# Patient Record
Sex: FEMALE | Race: WHITE | NOT HISPANIC OR LATINO | Employment: UNEMPLOYED | URBAN - METROPOLITAN AREA
[De-identification: names, ages, dates, MRNs, and addresses within clinical notes are randomized per-mention and may not be internally consistent; named-entity substitution may affect disease eponyms.]

---

## 2018-07-09 ENCOUNTER — HOSPITAL ENCOUNTER (EMERGENCY)
Facility: CLINIC | Age: 58
Discharge: HOME | End: 2018-07-10
Attending: EMERGENCY MEDICINE | Admitting: EMERGENCY MEDICINE
Payer: COMMERCIAL

## 2018-07-09 VITALS
SYSTOLIC BLOOD PRESSURE: 137 MMHG | WEIGHT: 125 LBS | HEIGHT: 61 IN | BODY MASS INDEX: 23.6 KG/M2 | DIASTOLIC BLOOD PRESSURE: 72 MMHG | HEART RATE: 64 BPM | RESPIRATION RATE: 15 BRPM | OXYGEN SATURATION: 100 % | TEMPERATURE: 98.4 F

## 2018-07-09 DIAGNOSIS — H57.89 IRRITATION OF LEFT EYE: ICD-10-CM

## 2018-07-09 PROCEDURE — 99283 EMERGENCY DEPT VISIT LOW MDM: CPT

## 2018-07-09 ASSESSMENT — VISUAL ACUITY
OS: 20/25
OD: 20/100

## 2018-07-09 NOTE — ED AVS SNAPSHOT
Emergency Department    [address] [facility] [address]    Phone:  [phone]    Fax:  [phone]                                       Cristel Turk   MRN: [id number]    Department:   Emergency Department   Date of Visit:  7/9/2018           Patient Information     Date Of Birth          1960        Your diagnoses for this visit were:     Irritation of left eye        You were seen by Justin Klein MD.      Follow-up Information     Follow up with your eye doctor In 1 week.    Why:  As needed        Follow up with  Emergency Department.    Specialty:  EMERGENCY MEDICINE    Why:  If symptoms worsen    Contact information:    [address] [facility] [address] [phone]      Discharge References/Attachments     CONJUNCTIVITIS, NONSPECIFIC (ENGLISH)      24 Hour Appointment Hotline       To make an appointment at any [facility], call [phone] ([phone]). If you don't have a family doctor or clinic, we will help you find one. [facility] are conveniently located to serve the needs of you and your family.             Review of your medicines      Notice     You have not been prescribed any medications.            Orders Needing Specimen Collection     None      Pending Results     No orders found for last 3 day(s).            Pending Culture Results     No orders found for last 3 day(s).            Pending Results Instructions     If you had any lab results that were not finalized at the time of your Discharge, you can call the ED Lab Result RN at [phone]. You will be contacted by this team for any positive Lab results or changes in treatment. The nurses are available 7 days a week from 10A to 6:30P.  You can leave a message 24 hours per day and they will return your call.        Test Results From Your Hospital Stay               Clinical Quality Measure: Blood Pressure Screening     Your blood pressure was checked while you were in  "the emergency department today. The last reading we obtained was  BP: 137/72 . Please read the guidelines below about what these numbers mean and what you should do about them.  If your systolic blood pressure (the top number) is less than 120 and your diastolic blood pressure (the bottom number) is less than 80, then your blood pressure is normal. There is nothing more that you need to do about it.  If your systolic blood pressure (the top number) is 120-139 or your diastolic blood pressure (the bottom number) is 80-89, your blood pressure may be higher than it should be. You should have your blood pressure rechecked within a year by a primary care provider.  If your systolic blood pressure (the top number) is 140 or greater or your diastolic blood pressure (the bottom number) is 90 or greater, you may have high blood pressure. High blood pressure is treatable, but if left untreated over time it can put you at risk for heart attack, stroke, or kidney failure. You should have your blood pressure rechecked by a primary care provider within the next 4 weeks.  If your provider in the emergency department today gave you specific instructions to follow-up with your doctor or provider even sooner than that, you should follow that instruction and not wait for up to 4 weeks for your follow-up visit.        Thank you for choosing [facility]       Thank you for choosing [facility] for your care. Our goal is always to provide you with excellent care. Hearing back from our patients is one way we can continue to improve our services. Please take a few minutes to complete the written survey that you may receive in the mail after you visit with us. Thank you!        [employer] Information     [employer] lets you send messages to your doctor, view your test results, renew your prescriptions, schedule appointments and more. To sign up, go to [email or web address] . Click on \"Log in\" on the left side of the screen, which will take you to " "the Welcome page. Then click on \"Sign up Now\" on the right side of the page.     You will be asked to enter the access code listed below, as well as some personal information. Please follow the directions to create your username and password.     Your access code is: [email or web address]  Expires: 10/8/2018  1:03 AM     Your access code will  in 90 days. If you need help or a new code, please call your [place] clinic or [phone].        Care EveryWhere ID     This is your Care EveryWhere ID. This could be used by other organizations to access your [place] medical records  [id number]        Equal Access to Services     JITENDRA BARTON : Jenifer Gayle, alberto zhong, betty kaplan, madai bills. So [facility] [phone].    ATENCIÓN: Si habla español, tiene a irwin disposición servicios gratuitos de asistencia lingüística. Llame al [phone].    We comply with applicable federal civil rights laws and [place] laws. We do not discriminate on the basis of race, color, national origin, age, disability, sex, sexual orientation, or gender identity.            After Visit Summary       This is your record. Keep this with you and show to your community pharmacist(s) and doctor(s) at your next visit.                  "

## 2018-07-09 NOTE — ED AVS SNAPSHOT
Emergency Department    [address] [place] [address]    Phone:  [phone]    Fax:  [phone]                                       Cristel Turk   MRN: [id number]    Department:   Emergency Department   Date of Visit:  7/9/2018           After Visit Summary Signature Page     I have received my discharge instructions, and my questions have been answered. I have discussed any challenges I see with this plan with the nurse or doctor.    ..........................................................................................................................................  Patient/Patient Representative Signature      ..........................................................................................................................................  Patient Representative Print Name and Relationship to Patient    ..................................................               ................................................  Date                                            Time    ..........................................................................................................................................  Reviewed by Signature/Title    ...................................................              ..............................................  Date                                                            Time

## 2018-07-10 RX ORDER — PROPARACAINE HYDROCHLORIDE 5 MG/ML
SOLUTION/ DROPS OPHTHALMIC
Status: DISCONTINUED
Start: 2018-07-10 | End: 2018-07-10 | Stop reason: HOSPADM

## 2018-07-10 ASSESSMENT — ENCOUNTER SYMPTOMS: EYE PAIN: 0

## 2018-07-10 NOTE — ED PROVIDER NOTES
"  History     Chief Complaint:  Eye Problem    HPI   Cristel Turk is a 58 year old female who presents to the emergency department today for a daily contact stuck in her left eye. The patient reports that this morning she put her contact in her left eye and it did not quite seem right, felt unclear. The patient reports tonight she went to take her contact out and feels like she can feel it but cannot bend it at all to get it out. She denies any eye problems other than the vision problems. She denies any eye pain but states it is irritated from how long the contact has been in there.     Allergies:  No Known Drug Allergies    Medications:    Medications reviewed. No current medications.     Past Medical History:    Medical history reviewed. No pertinent medical history.    Past Surgical History:    Surgical history reviewed. No pertinent surgical history.    Family History:    Family history reviewed. No pertinent family history.     Social History:  Social history reviewed. No pertinent social history.     Review of Systems   Eyes: Negative for pain.        Eye discomfort   All other systems reviewed and are negative.    Physical Exam     Patient Vitals for the past 24 hrs:   BP Temp Temp src Pulse Resp SpO2 Height Weight   07/09/18 2338 137/72 98.4  F (36.9  C) Temporal 64 15 100 % 1.549 m (5' 1\") 56.7 kg (125 lb)     Visual Acuity-Left: 20/25  Visual Acuity-Right: 20/100      Physical Exam  Constitutional:  Alert, answers questions appropriately.   Neck:    Normal range of motion   HEENT:  Pupils round, equal. Mild conjunctival irritation of left eye. No visible contact lens in the eye. No other foreign body. Cornea looks clear. No cell and flare. No photophobia. No corneal lesions. Extraocular movements are normal. Lids and lashes are normal.   Pulmonary/Chest:  Effort normal.   Neurological:   No facial asymmetry, gait intact  Psychiatric:   The patient has a normal mood and affect.     Emergency Department " Course   Emergency Department Course:    0003 Nursing notes and vitals reviewed.    0028 I performed an exam of the patient as documented above.     0045 I personally reviewed the physical exam results with the patient and answered all related questions prior to discharge.    Impression & Plan      Medical Decision Making:  Cristel Tukr is a 58 year old female who presents to the emergency department today reporting that she is unable to get her left contact lens out of her eye. She said her vision did not seem normal today since she put it in, stating it was a little more blurry than usual.     Her eye exam is completely benign aside from some mild conjunctival inflammation. There is no contact lens over her cornea or elsewhere in the eye. I suspect that she did not put it in properly during the day or perhaps it fell out. Her conjunctiva is irritated because of her attempts to remove the contact that is not there. Slit lamp exam is benign. I discussed this with the patient. There are no other concerning findings. I think she is safe for discharge. She left in good condition.      Diagnosis:    ICD-10-CM    1. Irritation of left eye H57.8      Disposition:   The patient is discharged to home.    Discharge Medications:  No discharge medications.    Scribe Disclosure:  I, Lola Rivers, am serving as a scribe at 12:36 AM on 7/10/2018 to document services personally performed by Justin Klein MD based on my observations and the provider's statements to me.     EMERGENCY DEPARTMENT       Justin Klein MD  07/11/18 6268

## 2019-06-18 ENCOUNTER — TRANSFERRED RECORDS (OUTPATIENT)
Dept: HEALTH INFORMATION MANAGEMENT | Facility: CLINIC | Age: 59
End: 2019-06-18

## 2019-08-13 ENCOUNTER — TRANSFERRED RECORDS (OUTPATIENT)
Dept: HEALTH INFORMATION MANAGEMENT | Facility: CLINIC | Age: 59
End: 2019-08-13

## 2019-11-15 ENCOUNTER — TELEPHONE (OUTPATIENT)
Dept: MEDSURG UNIT | Facility: CLINIC | Age: 59
End: 2019-11-15

## 2019-11-19 ENCOUNTER — HOSPITAL ENCOUNTER (OUTPATIENT)
Facility: CLINIC | Age: 59
Discharge: HOME | End: 2019-11-19
Admitting: PHYSICIAN ASSISTANT
Payer: COMMERCIAL

## 2019-11-19 ENCOUNTER — HOSPITAL ENCOUNTER (OUTPATIENT)
Dept: GENERAL RADIOLOGY | Facility: CLINIC | Age: 59
End: 2019-11-19
Attending: PSYCHIATRY & NEUROLOGY
Payer: COMMERCIAL

## 2019-11-19 VITALS
DIASTOLIC BLOOD PRESSURE: 60 MMHG | HEART RATE: 56 BPM | SYSTOLIC BLOOD PRESSURE: 104 MMHG | HEIGHT: 61 IN | TEMPERATURE: 98.6 F | WEIGHT: 126 LBS | OXYGEN SATURATION: 99 % | BODY MASS INDEX: 23.79 KG/M2 | RESPIRATION RATE: 16 BRPM

## 2019-11-19 DIAGNOSIS — F03.90 DEMENTIA (H): ICD-10-CM

## 2019-11-19 LAB
APPEARANCE CSF: CLEAR
APTT PPP: 29 SEC (ref 22–37)
COLOR CSF: COLORLESS
GLUCOSE CSF-MCNC: 62 MG/DL (ref 40–70)
GRAM STN SPEC: NORMAL
INR PPP: 1.01 (ref 0.86–1.14)
PLATELET # BLD AUTO: 187 10E9/L (ref 150–450)
PROT CSF-MCNC: 28 MG/DL (ref 15–60)
RBC # CSF MANUAL: 0 /UL (ref 0–2)
SPECIMEN SOURCE: NORMAL
TUBE # CSF: 4 #
WBC # CSF MANUAL: 0 /UL (ref 0–5)

## 2019-11-19 PROCEDURE — 84999 UNLISTED CHEMISTRY PROCEDURE: CPT | Mod: XU

## 2019-11-19 PROCEDURE — 86316 IMMUNOASSAY TUMOR OTHER: CPT

## 2019-11-19 PROCEDURE — 86618 LYME DISEASE ANTIBODY: CPT

## 2019-11-19 PROCEDURE — 87205 SMEAR GRAM STAIN: CPT

## 2019-11-19 PROCEDURE — 36415 COLL VENOUS BLD VENIPUNCTURE: CPT | Performed by: PHYSICIAN ASSISTANT

## 2019-11-19 PROCEDURE — 88108 CYTOPATH CONCENTRATE TECH: CPT

## 2019-11-19 PROCEDURE — 85049 AUTOMATED PLATELET COUNT: CPT | Performed by: PHYSICIAN ASSISTANT

## 2019-11-19 PROCEDURE — 85730 THROMBOPLASTIN TIME PARTIAL: CPT | Performed by: PHYSICIAN ASSISTANT

## 2019-11-19 PROCEDURE — 86360 T CELL ABSOLUTE COUNT/RATIO: CPT

## 2019-11-19 PROCEDURE — 87015 SPECIMEN INFECT AGNT CONCNTJ: CPT

## 2019-11-19 PROCEDURE — 87529 HSV DNA AMP PROBE: CPT

## 2019-11-19 PROCEDURE — 82042 OTHER SOURCE ALBUMIN QUAN EA: CPT

## 2019-11-19 PROCEDURE — 83519 RIA NONANTIBODY: CPT | Mod: XU

## 2019-11-19 PROCEDURE — 84157 ASSAY OF PROTEIN OTHER: CPT

## 2019-11-19 PROCEDURE — 84182 PROTEIN WESTERN BLOT TEST: CPT

## 2019-11-19 PROCEDURE — 86317 IMMUNOASSAY INFECTIOUS AGENT: CPT

## 2019-11-19 PROCEDURE — 83921 ORGANIC ACID SINGLE QUANT: CPT

## 2019-11-19 PROCEDURE — 83520 IMMUNOASSAY QUANT NOS NONAB: CPT

## 2019-11-19 PROCEDURE — 86255 FLUORESCENT ANTIBODY SCREEN: CPT

## 2019-11-19 PROCEDURE — 82784 ASSAY IGA/IGD/IGG/IGM EACH: CPT

## 2019-11-19 PROCEDURE — 40000863 ZZH STATISTIC RADIOLOGY XRAY, US, CT, MAR, NM

## 2019-11-19 PROCEDURE — 86592 SYPHILIS TEST NON-TREP QUAL: CPT

## 2019-11-19 PROCEDURE — 83916 OLIGOCLONAL BANDS: CPT

## 2019-11-19 PROCEDURE — 36415 COLL VENOUS BLD VENIPUNCTURE: CPT

## 2019-11-19 PROCEDURE — 89050 BODY FLUID CELL COUNT: CPT

## 2019-11-19 PROCEDURE — 87070 CULTURE OTHR SPECIMN AEROBIC: CPT

## 2019-11-19 PROCEDURE — 84999 UNLISTED CHEMISTRY PROCEDURE: CPT

## 2019-11-19 PROCEDURE — 85610 PROTHROMBIN TIME: CPT | Performed by: PHYSICIAN ASSISTANT

## 2019-11-19 PROCEDURE — 00000102 ZZHCL STATISTIC CYTO WRIGHT STAIN TC

## 2019-11-19 PROCEDURE — 86038 ANTINUCLEAR ANTIBODIES: CPT

## 2019-11-19 PROCEDURE — 83519 RIA NONANTIBODY: CPT

## 2019-11-19 PROCEDURE — 82945 GLUCOSE OTHER FLUID: CPT

## 2019-11-19 PROCEDURE — 62270 DX LMBR SPI PNXR: CPT

## 2019-11-19 PROCEDURE — 86341 ISLET CELL ANTIBODY: CPT

## 2019-11-19 PROCEDURE — 82040 ASSAY OF SERUM ALBUMIN: CPT

## 2019-11-19 PROCEDURE — 82164 ANGIOTENSIN I ENZYME TEST: CPT

## 2019-11-19 PROCEDURE — 86359 T CELLS TOTAL COUNT: CPT

## 2019-11-19 PROCEDURE — 82607 VITAMIN B-12: CPT

## 2019-11-19 PROCEDURE — 86689 HTLV/HIV CONFIRMJ ANTIBODY: CPT

## 2019-11-19 PROCEDURE — 88108 CYTOPATH CONCENTRATE TECH: CPT | Mod: 26

## 2019-11-19 RX ORDER — DEXTROSE MONOHYDRATE 25 G/50ML
25-50 INJECTION, SOLUTION INTRAVENOUS
Status: DISCONTINUED | OUTPATIENT
Start: 2019-11-19 | End: 2019-11-19 | Stop reason: HOSPADM

## 2019-11-19 RX ORDER — NICOTINE POLACRILEX 4 MG
15-30 LOZENGE BUCCAL
Status: DISCONTINUED | OUTPATIENT
Start: 2019-11-19 | End: 2019-11-19 | Stop reason: HOSPADM

## 2019-11-19 RX ORDER — LIDOCAINE HYDROCHLORIDE 10 MG/ML
3 INJECTION, SOLUTION EPIDURAL; INFILTRATION; INTRACAUDAL; PERINEURAL ONCE
Status: COMPLETED | OUTPATIENT
Start: 2019-11-19 | End: 2019-11-19

## 2019-11-19 RX ADMIN — LIDOCAINE HYDROCHLORIDE 3 ML: 10 INJECTION, SOLUTION EPIDURAL; INFILTRATION; INTRACAUDAL; PERINEURAL at 15:33

## 2019-11-19 ASSESSMENT — MIFFLIN-ST. JEOR: SCORE: 1073.91

## 2019-11-19 NOTE — PROGRESS NOTES
RADIOLOGY PROCEDURE NOTE  Patient name: Cristel Turk  MRN: [id number]  : 1960    Pre-procedure diagnosis: Memory loss  Post-procedure diagnosis: Same    Procedure Date/Time: 2019  3:47 PM  Procedure: LP   Estimated blood loss: None  Specimen(s) collected with description: 12-13 ml of clear CSF  The patient tolerated the procedure well with no immediate complications.  Significant findings: 14 cm of water    See imaging dictation for procedural details.    Provider name: Justus De Oliveira PA-C  Assistant(s):None

## 2019-11-19 NOTE — DISCHARGE INSTRUCTIONS
Lumbar Puncture Discharge Instructions     After you go home:      You may resume your normal diet    Continue to drink at least 8 ounces of fluid every 1-2 hours until bedtime tonight and continue to drink extra fluids for the next 2 days    Caffeinated beverages may help prevent or reduce spinal headaches    Care of Puncture Site:      If there is a bandaid - you may remove it tomorrow morning    You may shower tomorrow    No tub baths, whirlpools or swimming for 48 hours     Activity - to help prevent spinal headache or spinal fluid leakage:      Minimize your activity today. Flat bedrest for 24 hrs is strongly suggested as this will help to prevent a spinal headache.  You can be up to the bathroom and for meals.    Resume normal activities tomorrow.     Avoid strenuous activity for the next 2 days    Do not drive a vehicle until tomorrow morning    Medicines:      You may resume all medications    Resume your Warfarin/Coumadin at your regular dose today. Follow up with your provider to have your INR rechecked    Resume your Platelet Inhibitors and Aspirin tomorrow at your regular dose    For minor pain, you may take Acetaminophen (Tylenol) or Ibuprofen (Advil)            Call the provider who ordered this procedure if:      Your headache becomes worse or is severe. (A minor headache is not unusual)    You have nausea or vomiting    The site is red, swollen, hot or tender    You have chills or a fever greater than 101 F (38 C)    Any questions or concerns    If you have questions call:        [facility] @ [phone]

## 2019-11-19 NOTE — PROGRESS NOTES
Care Suites Admission Nursing Note    Reason for admission: LP/Spinal tap  CS arrival time: 1340  Accompanied by: Lul-  Name/phone of DC : Lul [phone]  Medications held: ASA, last dose 11/15/19.  Consent signed: Justus FRANCE here to obtain informed consent.  Abnormal assessment/labs: Justus FRANCE updated that patient is only a 4 day ASA hold.  He states ok to proceed.  Labs WNL.  If abnormal, provider notified: Yes.  Education/questions answered: Yes.  Procedure discussed and reviewed.  AVS given and reviewed with verbal understanding received.    Plan: Proceed as scheduled.    4766  Hand off report given to Esme Kulkarni RN.

## 2019-11-19 NOTE — PROGRESS NOTES
Patient back from LP at 1600.  Rating headache pain at a 2 of 10.  Other VS WNL, and LP puncture site is CDI with a bandaid.  Per Justus matamoros for patient to leave at 1645.   Care Suites Discharge Nursing Note    Education/questions answered: YES  Patient DC location: HOME  Accompanied by:   CS discharge time: 1700

## 2019-11-20 LAB
ANA SER QL IF: NEGATIVE
B BURGDOR IGG+IGM SER QL: 0.03 (ref 0–0.89)
CD3 CELLS # BLD: 1061 CELLS/UL (ref 603–2990)
CD3 CELLS NFR BLD: 70 % (ref 49–84)
CD3+CD4+ CELLS # BLD: 700 CELLS/UL (ref 441–2156)
CD3+CD4+ CELLS NFR BLD: 46 % (ref 28–63)
CD3+CD4+ CELLS/CD3+CD8+ CLL BLD: 2.19 % (ref 1.4–2.6)
CD3+CD8+ CELLS # BLD: 314 CELLS/UL (ref 125–1312)
CD3+CD8+ CELLS NFR BLD: 21 % (ref 10–40)
COPATH REPORT: NORMAL
HSV1 DNA CSF QL NAA+PROBE: NOT DETECTED
HSV2 DNA CSF QL NAA+PROBE: NOT DETECTED
IFC SPECIMEN: NORMAL
MICROBIOLOGIST REVIEW: NORMAL
VIT B12 SERPL-MCNC: 947 PG/ML (ref 193–986)

## 2019-11-21 LAB
ACE CSF-CCNC: 1.1 U/L (ref 0–2.5)
ALB CSF/SERPL: 2.3 RATIO (ref 0–9)
ALBUMIN CSF-MCNC: 10 MG/DL (ref 0–35)
ALBUMIN SERPL-MCNC: 4290 MG/DL (ref 3500–5200)
HIV1 AB SERPL QL IB: NEGATIVE
IGG CSF-MCNC: 0.9 MG/DL (ref 0–6)
IGG SERPL-MCNC: 631 MG/DL (ref 768–1632)
IGG SYNTH RATE SER+CSF CALC-MRATE: <0 MG/D
IGG/ALB CLEAR SER+CSF-RTO: 0.61 RATIO (ref 0.28–0.66)
IGG/ALB CSF: 0.09 RATIO (ref 0.09–0.25)
METHYLMALONATE SERPL-SCNC: 0.21 UMOL/L (ref 0–0.4)
OLIGOCLONAL BANDS CSF ELPH-IMP: ABNORMAL
OLIGOCLONAL BANDS CSF ELPH-IMP: NEGATIVE
OLIGOCLONAL BANDS CSF IEF: 0 BANDS (ref 0–1)
RESULT: NORMAL
SEND OUTS MISC TEST CODE: NORMAL
SEND OUTS MISC TEST SPECIMEN: NORMAL
TEST NAME: NORMAL
VDRL CSF QL: NON REACTIVE

## 2019-11-22 LAB — NSE CSF IA-MCNC: 12.8 UG/L (ref 1–7)

## 2019-11-24 LAB
BACTERIA SPEC CULT: NO GROWTH
SPECIMEN SOURCE: NORMAL

## 2019-11-28 LAB — PNP ABY SERUM: NORMAL

## 2019-12-03 LAB
RESULT: NORMAL
SEND OUTS MISC TEST CODE: NORMAL
SEND OUTS MISC TEST SPECIMEN: NORMAL
TEST NAME: NORMAL

## 2019-12-04 ENCOUNTER — TRANSFERRED RECORDS (OUTPATIENT)
Dept: HEALTH INFORMATION MANAGEMENT | Facility: CLINIC | Age: 59
End: 2019-12-04

## 2019-12-27 ENCOUNTER — TRANSCRIBE ORDERS (OUTPATIENT)
Dept: OTHER | Age: 59
End: 2019-12-27

## 2019-12-27 DIAGNOSIS — F03.90 DEMENTIA (H): Primary | ICD-10-CM

## 2019-12-27 DIAGNOSIS — F32.A DEPRESSION: ICD-10-CM

## 2019-12-27 DIAGNOSIS — G97.1 POST LUMBAR PUNCTURE HEADACHE: ICD-10-CM

## 2020-01-27 ENCOUNTER — DOCUMENTATION ONLY (OUTPATIENT)
Dept: CARE COORDINATION | Facility: CLINIC | Age: 60
End: 2020-01-27

## 2020-01-30 ENCOUNTER — PRE VISIT (OUTPATIENT)
Dept: NEUROLOGY | Facility: CLINIC | Age: 60
End: 2020-01-30

## 2020-01-30 NOTE — TELEPHONE ENCOUNTER
FUTURE VISIT INFORMATION      FUTURE VISIT INFORMATION:    Date: 2/26/2020    Time: 9AM    [place] [facility] [place]  REFERRAL INFORMATION:    Referring provider:  Dr. Chau    Referring providers clinic:  [facility]     Reason for visit/diagnosis  Dementia     RECORDS REQUESTED FROM:       Clinic name Comments Records Status Imaging Status   [facility]   Requested Requested    [facility] 12/11/2019, 11/13/2019, 7/17/2019,  Care Everywhere Requested to PACS-MR Head 6/20/2019 by mail                              -2/13/2020-[facility] Records received and scanned to Media tab.   -MR Head 6/18/2019 Received from [facility] brought to be uploaded to PACS-MR @12pm

## 2020-02-25 NOTE — PROGRESS NOTES
[facility] Neurology Consultation    Cristel Turk MRN# [id number]   Age: 60 year old YOB: 1960     Requesting physician: Xin Obando     Reason for Consultation: memory loss ongoing, second opinion    History of Presenting Symptoms:  Cristel Turk is a 60 year old female who presents today for evaluation of memory loss    Much of the patient's past information is taken from 7/17/2019 note [facility].  The patient had 2 years memory concerns at this time, but quickened progression over the few months before visit. At the time of the visit, she continued to ask the same questions, couldn't remember one week - two weeks prior, and loss of memory of conversations w/in 10-30 minutes.  She was not reading anymore at the time of visit, but was an avid reader a few years ago. She was still cooking without mistakes, doing household duties, and driving at this time.  She was asked to have EEG and neuropsychometric testing. Exam was revealing for poor memory. Neuropschological testing revealed characteristics for degenerative process, questionable Alzheimer dementia.  It was discussed on 11/13/2019 follow up that they may perform LP to look exclude other treatable causes of her cognitive decline as well as possibly repeating MRI.  LP was done 11/2019 with no abnormal findings (see below in data), but the patient did have post LP headache.    Two years ago, family members started noticing that Cristel was loosing her ability to do her job.  The patient couldn't do the tasks asked of her, she was getting overwhelmed with things she was doing for 20-30+ years.  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  The patient indicates that she has a fog sometimes in her head.  When she takes the medication at night (Donepezil) she does feel better during the day the next day.  In daily life, she has difficulty indicating what is difficult.  She has to write everything on a calender, she may miss appointments, she struggles with finances now/paperwork, the patient drives to places she knows but if she is on roads she doesn't know then she will get lost.      Six years ago, her current  met her.  He remembers that things were quite different.  She was talkative, could talk for hours on the phone.  She was managing her own finances, working by herself, planning events and parties, driving without incidence.  It is difficult for the family to indicate whether symptoms were present before 2017, around the time of her wedding.  They cannot name specific things they noticed between 2017 and six years ago that they found concerning or atypical.     The patient has no falls reported, doesn't have episodes of dizziness, no hallucinations, no aggressive behaviors, no hypersexual or inappropriate behaviors for social circumstances.  She has no odd movements of her hands or mouth. No swallowing difficulties. No urinary incontinence.  She sleeps over 8 hours a night, but is unsure if she wakes or moves in her sleep.    The family today indicates that they know there may be a dementing process but that they don't have a specific diagnosis.  They are wondering if anything can be done to reverse this process or stop it.      Past Medical History:   Basal ambreen carcinoma  Lichenoid actinic keratosis     Past Surgical History:   None     Social History:   Worked as a para in a school , was working as a  2 years prior.  She was  in 2018 to her current .   Tobacco Use     Smoking status: Never Smoker     Smokeless tobacco: Never Used   Substance Use Topics     Alcohol use: Yes     Comment: Occasional glass of wine     Drug use: No      Family History:   Aunt - memory issues  Uncle - memory issues     Medications:   Donepezil 10 mg at bedtime  ASA 81 mg QD     Allergies:   No Known Allergies     Review of Systems:   A comprehensive 10 point  "review of systems (constitutional, ENT, cardiac, peripheral vascular, lymphatic, respiratory, GI, , Musculoskeletal, skin, Neurological) was performed and found to be negative except as described in this note.      Physical Exam:   Vitals: /73   Pulse 53   Ht 1.549 m (5' 1\")   Wt 55.2 kg (121 lb 12.8 oz)   SpO2 99%   BMI 23.01 kg/m     General: Seated comfortably in no acute distress.  HEENT: Neck supple with normal range of motion. No paracervical muscle tenderness or tightness.  Optic discs sharp and vasculature normal on funduscopic exam.   Heart: Regular rate  Lungs: breathing comfortably  GI: Non tender  Extremities: no edema  Skin: No rashes  Neurologic:    The majority of the visit was spent discussing prior w/up, imaging, and neuropsychological testing.  We ran out of time to have a more specific neurological exam other than what is below.  - Patient has appropriate blink throughout visit. Makes full range of motion with eyes.  No facial droop. No weakness with standing or sitting. sparse speech throughout. Able to read sentences and follow simple commands.         Data: Pertinent prior to visit   Imaging:  MRI brain: 6/20/2019:  The ventricles, sulci and gyri are normal size, shape and contour for age. The midline structures are centrally located with no evidence of shift. There are no suspicious intra or extra-axial fluid collections. No region of restricted diffusion or suspicious regions of abnormal parenchymal enhancement. Expected flow voids in the cavernous carotids and basilar artery.    MRI brain: 5/25/2006  There is mild decreased signal intensity to the bone marrow of the calvarium which is unchanged from the prior study and may be within the range of normal.  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  The ventricles appear normal in size.  No definite signal abnormalities are seen involving the brain parenchyma.  Probable small mucus retention cysts are seen within the " nasopharynx.  Normal flow voids are seen within the major arterial and venous structures.  A probable small mucus retention cyst is seen within the floor of the left maxillary sinus.  The mastoid air cells appear unopacified. Incidental note is made of choroid plexus cysts, left greater than right, described on the prior study, unchanged in appearance.    MRA: 3/20/2006  1.   Vascular tortuosity at an origin of branch vessel from the left M1 segment accounts for the flow void seen on the MRI.  No evidence of aneurysm in this location.  2.   Prominence of the basilar tip which can be seen as a normal variation.  Follow up MRA in one to two years may be helpful to ensure stability of this finding.  3.   The examination is otherwise unremarkable.    Procedures:  EE2019  - Abnormal, diffuse generalized slowing as well as frontal delta slowing consistent with diffuse brain dysfunction such as seen a with encephalopathies of multiple causes (toxic, metabolic, degenerative). No epileptiform activity recorded.    Laboratory:  B12 1240  TSH 3.34  MIC kelli negative  HIV negative    LP: 2019  Cytology, Lyme, autoimmune dementia panel, paraneoplastic panel, oligoclonal bands, VDRL, ACE - Normal  Protein 28, glucose 62, WBC 0, RBC 0  Neuron specific amylase - 12.8 (high)    Neuropsychological report:  I do not have full access to this at the time of visit.  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  Her prior studies were extensive and I wouldn't expand off of them given the  patient's history and symptom progression.  There is not an acute picture to her disease progression  Her MRI doesn't have atrophy for specific regions of the brain usually seen with Alzheimer's disease, nor for other dementias (FTD, CBD, MSA).  The patient is in her early 60's, and while this is on the boarder for onset of Alzheimer's that is early, and cannot say for certain when the beginning of her symptoms started.  At this point, I wouldn't recommend FDG-PET until seeing the patient again in 6 months or other molecular testing for early onset Alzheimer's Disease unless symptoms change (more rapid progression, added cognitive domains being affected).  We did discuss limiting driving at this time, and I encouraged the patient to not drive unless absolutely necessary/emergency.     Plan:  - OT for performance testing  - SW consult for community resources  - Sleep study for sleep apnea evaluation  - Recommended patient discuss with her PCP referral to nearby psychiatrist for depression monitoring  - Continue with Donepezil 10 mg at bedtime    Future:  - Consider EEG repeat with 3 hour sleep deprived study    Follow up in Neurology clinic in 6 months or earlier as needed should new concerns arise.    PITO Wen D.O.   of Neurology      Greater than 50% of the total time (100 min) in this patient encounter was spent on counseling and/or coordination of care. We reviewed diagnostic results, impressions, and discussed other possible tests if symptoms do not improve. We discussed the implications of the diagnosis, as well as risks and benefits of management options. We reviewed treatment instructions and our scheduled follow-up as specified in the discharge plan. We also discussed the importance of compliance with the chosen course of treatment. The patient is in agreement with this plan and has no further questions.

## 2020-02-26 ENCOUNTER — OFFICE VISIT (OUTPATIENT)
Dept: NEUROLOGY | Facility: CLINIC | Age: 60
End: 2020-02-26
Attending: PSYCHIATRY & NEUROLOGY
Payer: COMMERCIAL

## 2020-02-26 VITALS
SYSTOLIC BLOOD PRESSURE: 120 MMHG | DIASTOLIC BLOOD PRESSURE: 73 MMHG | BODY MASS INDEX: 23 KG/M2 | WEIGHT: 121.8 LBS | HEART RATE: 53 BPM | HEIGHT: 61 IN | OXYGEN SATURATION: 99 %

## 2020-02-26 DIAGNOSIS — G30.0 EARLY ONSET ALZHEIMER'S DEMENTIA WITHOUT BEHAVIORAL DISTURBANCE (H): Primary | ICD-10-CM

## 2020-02-26 DIAGNOSIS — F02.80 EARLY ONSET ALZHEIMER'S DEMENTIA WITHOUT BEHAVIORAL DISTURBANCE (H): Primary | ICD-10-CM

## 2020-02-26 RX ORDER — MULTIPLE VITAMINS W/ MINERALS TAB 9MG-400MCG
1 TAB ORAL DAILY
COMMUNITY

## 2020-02-26 RX ORDER — DONEPEZIL HYDROCHLORIDE 10 MG/1
10 TABLET, FILM COATED ORAL AT BEDTIME
Status: ON HOLD | COMMUNITY
End: 2022-02-08

## 2020-02-26 ASSESSMENT — PATIENT HEALTH QUESTIONNAIRE - PHQ9: SUM OF ALL RESPONSES TO PHQ QUESTIONS 1-9: 1

## 2020-02-26 ASSESSMENT — ENCOUNTER SYMPTOMS
TREMORS: 0
EYE REDNESS: 0
EYE WATERING: 0
PARALYSIS: 0
DISTURBANCES IN COORDINATION: 0
EYE PAIN: 0
DIZZINESS: 0
HEADACHES: 0
TINGLING: 0
NUMBNESS: 0
SEIZURES: 0
MEMORY LOSS: 1
DOUBLE VISION: 0
SPEECH CHANGE: 0
WEAKNESS: 0
LOSS OF CONSCIOUSNESS: 0
EYE IRRITATION: 0

## 2020-02-26 ASSESSMENT — MIFFLIN-ST. JEOR: SCORE: 1049.86

## 2020-02-26 ASSESSMENT — PAIN SCALES - GENERAL: PAINLEVEL: NO PAIN (0)

## 2020-02-26 NOTE — NURSING NOTE
Chief Complaint   Patient presents with     Consult     [facility] NEW - POSS DEMENTIA      Ruby Garza, EMT

## 2020-02-26 NOTE — LETTER
2/26/2020        RE: Cristel Turk  [address] [place] [address]     Dear Colleague,    Thank you for referring your patient, Cristel Turk, to the [facility] NEUROLOGY at [facility]. Please see a copy of my visit note below.    [facility] Neurology Consultation    Cristel Turk MRN# [id number]   Age: 60 year old YOB: 1960     Requesting physician: Xin Obando     Reason for Consultation: memory loss ongoing, second opinion    History of Presenting Symptoms:  Cristel Turk is a 60 year old female who presents today for evaluation of memory loss    Much of the patient's past information is taken from 7/17/2019 note with Neurologist Dr.Raluca Zendejas.  The patient had 2 years memory concerns at this time, but quickened progression over the few months before visit. At the time of the visit, she continued to ask the same questions, couldn't remember one week - two weeks prior, and loss of memory of conversations w/in 10-30 minutes.  She was not reading anymore at the time of visit, but was an avid reader a few years ago. She was still cooking without mistakes, doing household duties, and driving at this time.  She was asked to have EEG and neuropsychometric testing. Exam was revealing for poor memory. Neuropschological testing revealed characteristics for degenerative process, questionable Alzheimer dementia.  It was discussed on 11/13/2019 follow up that they may perform LP to look exclude other treatable causes of her cognitive decline as well as possibly repeating MRI.  LP was done 11/2019 with no abnormal findings (see below in data), but the patient did have post LP headache.    Two years ago, family members started noticing that Cristel was loosing her ability to do her job.  The patient couldn't do the tasks asked of her, she was getting overwhelmed with things she was doing for 20-30+ years.  She does mention there was a change in the principal  and this person didn't mesh well with her.  The patient indicates that she has a fog sometimes in her head.  When she takes the medication at night (Donepezil) she does feel better during the day the next day.  In daily life, she has difficulty indicating what is difficult. She has to write everything on a calender, she may miss appointments, she struggles with finances now/paperwork, the patient drives to places she knows but if she is on roads she doesn't know then she will get lost.      Six years ago, her current  met her.  He remembers that things were quite different.  She was talkative, could talk for hours on the phone.  She was managing her own finances, working by herself, planning events and parties, driving without incidence.  It is difficult for the family to indicate whether symptoms were present before 2017, around the time of her wedding.  They cannot name specific things they noticed between 2017 and six years ago that they found concerning or atypical.     The patient has no falls reported, doesn't have episodes of dizziness, no hallucinations, no aggressive behaviors, no hypersexual or inappropriate behaviors for social circumstances.  She has no odd movements of her hands or mouth. No swallowing difficulties. No urinary incontinence.  She sleeps over 8 hours a night, but is unsure if she wakes or moves in her sleep.    The family today indicates that they know there may be a dementing process but that they don't have a specific diagnosis.  They are wondering if anything can be done to reverse this process or stop it.      Past Medical History:   Basal ambreen carcinoma  Lichenoid actinic keratosis     Past Surgical History:   None     Social History:   Worked as a para in a school , was working as a  2 years prior.  She was  in 2018 to her current .   Tobacco Use     Smoking status: Never Smoker     Smokeless tobacco: Never Used   Substance Use Topics     Alcohol use:  "Yes     Comment: Occasional glass of wine     Drug use: No      Family History:   Aunt - memory issues  Uncle - memory issues     Medications:   Donepezil 10 mg at bedtime  ASA 81 mg QD     Allergies:   No Known Allergies     Review of Systems:   A comprehensive 10 point review of systems (constitutional, ENT, cardiac, peripheral vascular, lymphatic, respiratory, GI, , Musculoskeletal, skin, Neurological) was performed and found to be negative except as described in this note.      Physical Exam:   Vitals: /73   Pulse 53   Ht 1.549 m (5' 1\")   Wt 55.2 kg (121 lb 12.8 oz)   SpO2 99%   BMI 23.01 kg/m      General: Seated comfortably in no acute distress.  HEENT: Neck supple with normal range of motion. No paracervical muscle tenderness or tightness.  Optic discs sharp and vasculature normal on funduscopic exam.   Heart: Regular rate  Lungs: breathing comfortably  GI: Non tender  Extremities: no edema  Skin: No rashes  Neurologic:    The majority of the visit was spent discussing prior w/up, imaging, and neuropsychological testing.  We ran out of time to have a more specific neurological exam other than what is below.  - Patient has appropriate blink throughout visit. Makes full range of motion with eyes.  No facial droop. No weakness with standing or sitting. sparse speech throughout. Able to read sentences and follow simple commands.         Data: Pertinent prior to visit   Imaging:  MRI brain: 6/20/2019:  The ventricles, sulci and gyri are normal size, shape and contour for age. The midline structures are centrally located with no evidence of shift. There are no suspicious intra or extra-axial fluid collections. No region of restricted diffusion or suspicious regions of abnormal parenchymal enhancement. Expected flow voids in the cavernous carotids and basilar artery.    MRI brain: 5/25/2006  There is mild decreased signal intensity to the bone marrow of the calvarium which is unchanged from the prior " study and may be within the range of normal.  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  The ventricles appear normal in size.  No definite signal abnormalities are seen involving the brain parenchyma.  Probable small mucus retention cysts are seen within the nasopharynx.  Normal flow voids are seen within the major arterial and venous structures.  A probable small mucus retention cyst is seen within the floor of the left maxillary sinus.  The mastoid air cells appear unopacified. Incidental note is made of choroid plexus cysts, left greater than right, described on the prior study, unchanged in appearance.    MRA: 3/20/2006  1.   Vascular tortuosity at an origin of branch vessel from the left M1 segment accounts for the flow void seen on the MRI.  No evidence of aneurysm in this location.  2.   Prominence of the basilar tip which can be seen as a normal variation.  Follow up MRA in one to two years may be helpful to ensure stability of this finding.  3.   The examination is otherwise unremarkable.    Procedures:  EE2019  - Abnormal, diffuse generalized slowing as well as frontal delta slowing consistent with diffuse brain dysfunction such as seen a with encephalopathies of multiple causes (toxic, metabolic, degenerative). No epileptiform activity recorded.    Laboratory:  B12 1240  TSH 3.34  MIC kelli negative  HIV negative    LP: 2019  Cytology, Lyme, autoimmune dementia panel, paraneoplastic panel, oligoclonal bands, VDRL, ACE - Normal  Protein 28, glucose 62, WBC 0, RBC 0  Neuron specific amylase - 12.8 (high)    Neuropsychological report:  I do not have full access to this at the time of visit.  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  Her prior studies were extensive and I wouldn't expand off of them given the patient's history and symptom progression.  There is not an acute picture to her disease progression  Her MRI doesn't have atrophy for specific regions of the brain usually seen with Alzheimer's disease, nor for other dementias (FTD, CBD, MSA).  The patient is in her early 60's, and while this is on the boarder for onset of Alzheimer's that is early, and cannot say for certain when the beginning of her symptoms started.  At this point, I wouldn't recommend FDG-PET until seeing the patient again in 6 months or other molecular testing for early onset Alzheimer's Disease unless symptoms change (more rapid progression, added cognitive domains being affected).  We did discuss limiting driving at this time, and I encouraged the patient to not drive unless absolutely necessary/emergency.     Plan:  - OT for performance testing  - SW consult for community resources  - Sleep study for sleep apnea evaluation  - Recommended patient discuss with her PCP referral to nearby psychiatrist for depression monitoring  - Continue with Donepezil 10 mg at bedtime    Future:  - Consider EEG repeat with 3 hour sleep deprived study    Follow up in Neurology clinic in 6 months or earlier as needed should new concerns arise.    PITO Wen D.O.   of Neurology      Greater than 50% of the total time (100 min) in this patient encounter was spent on counseling and/or coordination of care. We reviewed diagnostic results, impressions, and discussed other possible tests if symptoms do not improve. We discussed the implications of the diagnosis, as well as risks and benefits of management options. We reviewed treatment instructions and our scheduled follow-up as  specified in the discharge plan. We also discussed the importance of compliance with the chosen course of treatment. The patient is in agreement with this plan and has no further questions.    Gómez Wen, DO

## 2020-02-26 NOTE — PATIENT INSTRUCTIONS
I believe you have a neurodegenerative process.  I believe this is at a stage called moderate-cognitive impairment- amnestic type.  This classification can become Alzheimer's Disease, and I believe that this diagnosis will be made in the future during repeat evaluations.    - OT evaluation for performance testing  -  for [facility]  - Sleep study for apnea evaluation

## 2020-03-02 ENCOUNTER — PATIENT OUTREACH (OUTPATIENT)
Dept: CARE COORDINATION | Facility: CLINIC | Age: 60
End: 2020-03-02

## 2020-03-02 NOTE — PROGRESS NOTES
Social Work Intervention  [facility]    Data/Intervention:    Patient Name:  Cristel Turk  /Age:  1960 (60 year old)    Visit Type: telephone  Referral Source: Dr Gómez Wen  Reason for Referral:  New dx Alzheimer's, community resources and support    Collaborated With:    -Lul Turk Pt's spouse  -Miriam Taveras, Pt's dtr    Patient Concerns/Issues:   Contacted Pt's spouse Lul and he suggested I also contact Pt's dtr Miriam to discuss resources and information related to Alzhiemer's disease.   Pt has been  to Lul 3 years and has known him for 6 years. Pt has 2 children from another relationship, Miriam and Sreedhar,  Both living in the [facility] area. Sreedhar is not very involved and hasn't had a good relationship with his Mother. Kemal indicates that Miriam has been very involved and helpful. Miriam is a stay home Mom is ready any day to deliver her 3rd child.   Pt stopped working about a year ago. She is not receiving any disability income.  She had difficulty at work due to memory loss. She is driving. She uses a GPS. Lul feels she is still safe. Miriam plans to ride with her to make sure she is still ok to drive. They are concerned about her isolation alfred if she needs to stop driving. She moved to [place] when she  Kemal and doesn't have a social support network there. Kemal is working full time. She has a local PCP and has an appt. Miriam plans to request a depression assessment. She indicated that her Mother is very sad about her dx. Kemal said that sometimes she remembers it and other times she doesn't. Miriam indicates that she has lost some weight.     Intervention/Education/Resources Provided:  Reviewed SSDI and how to apply. Kemal is reluctant to seek that assistance and feels they can manage without that income.  He didn't want to get too many details today and asked that I share more with Miriam. He is looking/observing for safety issues. He  feels she is still ok to drive. He did state that she gets frustrated more easily and overwhelmed when she has difficulty with the gps. There haven't been any mishaps in the kitchen but discussed the stove as being a possible safety issue.  Reviewed Legal documents that are helpful and not to wait until she needs them to get them done. They need to be done asap. This includes a HCD, POA$ and a WIll.  Discussed contacting the Alzheimer's [employer] to meet with their staff about programs offered for support and other resources.   Discussed the CADI waiver program and services it offers at home including a day program which might be of benefit to Pt relatively soon. Discussed MA-LTC she must qualify for to get on the CADI program.  I emailed above resources to Pt's dtr Miriam.     Assessment/Plan:  Didn't contact Pt and Miriam verified that was the right choice. She would be confused. Support to Pt's family re this difficult dx and discussed planning for the future. Pt's spouse is overwhelmed. Miriam is helping them and will be key to getting her connected with resources. Encouraged them to contact me as needed in the future.    Provided patient/family with contact information and availability.    RUBINA Jones, [facility]    [facility] [place]  [phone]

## 2020-03-06 ENCOUNTER — PATIENT OUTREACH (OUTPATIENT)
Dept: CARE COORDINATION | Facility: CLINIC | Age: 60
End: 2020-03-06

## 2020-03-06 NOTE — PROGRESS NOTES
Social Work Telephone Message Note  [facility] [place]    Patient Name:  Cristel Turk  /Age:  1960 (60 year old)    Referral Source: Call from pt's  (Coverage for SW Antonina Jacobs)  Reason for Referral: Applying for SSDI     Received message from pt's , Kemal, requesting call back to discuss applying for disability.  contacted patient via telephone on 3/6/20. Left message on patient's voicemail with SW call back info. Will await patient's return phone call and provide assistance at that time.    RUBINA Mak, [facility]  Clinical   Outpatient Speciality Clinics   [facility] [place]  Ph. [phone]    NO LETTER

## 2020-03-13 ENCOUNTER — PATIENT OUTREACH (OUTPATIENT)
Dept: CARE COORDINATION | Facility: CLINIC | Age: 60
End: 2020-03-13

## 2020-03-13 NOTE — PROGRESS NOTES
Social Work Follow-Up  [facility]    Data/Intervention:  Patient Name:  Cristel Turk  /Age:  1960 (60 year old)    Reason for Follow-Up:  Paperwork questions    Collaborated With:    -Lul, Pt's spouse    Intervention/Education/Resources Provided:  Pt's ann Mccarty called re paperwork that Pt's dtr gave him that I had sent to her. It's [facility] paperwork related to getting on the CADI waiver so she can get services to support and care for her at home.  Discussed the paperwork and some of the criteria. He will complete and send to their county. He will notify me when he is sending in the paperwork so that we can arrange to have a CADI assessment at home.     Assessment/Plan:  Await spouses phone call and set up CADI assessment.     Previously provided patient/family with writer's contact information and availability.       RUBINA Jones, [facility]    [facility]  [phone]

## 2020-03-15 ENCOUNTER — HEALTH MAINTENANCE LETTER (OUTPATIENT)
Age: 60
End: 2020-03-15

## 2020-08-06 ENCOUNTER — TELEPHONE (OUTPATIENT)
Dept: NEUROLOGY | Facility: CLINIC | Age: 60
End: 2020-08-06

## 2020-08-06 NOTE — TELEPHONE ENCOUNTER
I called Miriam back. I let her know we can include family members in the video visit. We can send them an e-mail invite and/or call them and conference them into the visit. I asked she call with contact information for the family members who wish to be present and we can place that info in pt appointment note. Then the day of the visit we can reach out to everyone.     Melonie BUSCH

## 2020-08-06 NOTE — TELEPHONE ENCOUNTER
[facility] Call Center    Phone Message    May a detailed message be left on voicemail: yes     Reason for Call: Other: Per call from Miriam there are three family members that wanted to be apart of the video APPT on 9/1 and would like to know if it's possible. Please advise.     Action Taken: Message routed to:  [facility] ([facility] Neurology    Travel Screening: Not Applicable

## 2020-08-27 NOTE — TELEPHONE ENCOUNTER
[facility] Call Center    Phone Message    May a detailed message be left on voicemail: yes     Reason for Call: Other: Miriam called with family info: (Sreedhar, son) [email or web address], phone: [phone], Miriam: [email or web address], phone number as above.      Action Taken: Message routed to:  [facility] Neurology    Travel Screening: Not Applicable

## 2020-09-01 ENCOUNTER — VIRTUAL VISIT (OUTPATIENT)
Dept: NEUROLOGY | Facility: CLINIC | Age: 60
End: 2020-09-01
Payer: COMMERCIAL

## 2020-09-01 DIAGNOSIS — G30.0 EARLY ONSET ALZHEIMER'S DEMENTIA WITHOUT BEHAVIORAL DISTURBANCE (H): Primary | ICD-10-CM

## 2020-09-01 DIAGNOSIS — F02.80 EARLY ONSET ALZHEIMER'S DEMENTIA WITHOUT BEHAVIORAL DISTURBANCE (H): Primary | ICD-10-CM

## 2020-09-01 RX ORDER — ESCITALOPRAM OXALATE 5 MG/1
1 TABLET ORAL DAILY
COMMUNITY
Start: 2020-06-01 | End: 2022-01-29

## 2020-09-01 RX ORDER — RIBOFLAVIN (VITAMIN B2) 100 MG
100 TABLET ORAL DAILY
COMMUNITY
Start: 2020-06-01 | End: 2022-01-29

## 2020-09-01 NOTE — LETTER
9/1/2020       RE: Cristel Turk  [address] [place] [address]     Dear Colleague,    Thank you for referring your patient, Cristel Turk, to the [facility] NEUROLOGY at [facility]. Please see a copy of my visit note below.    [facility] Neurology Follow Up Visit    Cristel Turk MRN# [id number]   Age: 60 year old YOB: 1960     Brief history of symptoms: The patient was initially seen in neurologic consultation on 2/26/2020 for evaluation of memory loss/second opinion. Please see the comprehensive neurologic consultation note from that date in the Epic records for details. The patient had prior w/up through Neurologist on 7/17/2019 with poor memory testing being reported and w/up including EEG and neuropsychological testing.  MRI brain from 2019 compared to 2006 showed normal seize/shape/contour for age of ventricles/sulci/gyri.  Neuropsychological testing showed characteristic cognitive deficits for neurodegenerative process, questionable Alzheimer's Disease. OT assessment was done 12/2019, showing MoCA of 15/30     LP was done 11/2019 with no major findings.  My initial impression was for MCI amestic type, and further recommendations for OT, SW, Sleep study, pychiatric management of depression, and continuing of Donepezil 10 mg at bedtime was made.    Interval history: The patient has been taking anti-anxiety medications as per her PCP.  No sleep study was done.  There was no evaluation through an occupational therapist.  The anti-anxiety medications aren't necessarily helping, but it is unclear that the patient is taking her medications consistently.  There are no new neurological symptoms to speak of, reported from family or the patient.     Medications:     Current Outpatient Medications   Medication Sig     aspirin 81 MG tablet Take 1 tablet (81 mg) by mouth daily     donepezil (ARICEPT) 10 MG tablet Take 10 mg by mouth At Bedtime     multivitamin w/minerals  (MULTI-VITAMIN) tablet Take 1 tablet by mouth daily   - Sertraline - 50 mg QHS     Review of Systems:   A comprehensive 10 point review of systems (constitutional, ENT, cardiac, peripheral vascular, lymphatic, respiratory, GI, , Musculoskeletal, skin, Neurological) was performed and found to be negative except as described in this note.          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  I discussed with the family and patient today that I believe the patient has a mild to major neurocognitive disorder which will likely progress into Alzheimer's dementia with repeated evaluations.  The patient is responding well/has minimal side-effects with Donepezil and we could add no Memantine to see if helps as an adjunctive agent to donepezil.  She may benefit from repeated neuropsychological evaluation in early 2021 to see what/if any progression has occurred.  She may also benefit from seeing a dementia specialist given her age of onset and prior testing.  These tests will be considered on our follow up in early 2021.     Plan:  Memantine 5 mg QAM  OT for CPT  Sleep study    Follow up in Neurology clinic in 4-6 months or earlier as needed should new concerns arise.    Greater than 50% of the total time (45 min) in this patient encounter was spent on counseling and/or coordination of care. We reviewed diagnostic results, impressions, and discussed other possible tests if symptoms do not improve. We discussed the implications of the diagnosis, as well as risks and benefits of management options. We reviewed treatment instructions and our scheduled follow-up as specified in the discharge plan. We also discussed the importance of compliance with the chosen course of treatment. The patient is in agreement with this plan and has  no further questions.    Again, thank you for allowing me to participate in the care of your patient.  Sincerely,    PITO Wen D.O.   of Neurology

## 2020-09-01 NOTE — PROGRESS NOTES
"Cristel Turk is a 60 year old female who is being evaluated via a billable video visit.      The patient has been notified of following:     \"This video visit will be conducted via a call between you and your physician/provider. We have found that certain health care needs can be provided without the need for an in-person physical exam.  This service lets us provide the care you need with a video conversation.  If a prescription is necessary we can send it directly to your pharmacy.  If lab work is needed we can place an order for that and you can then stop by our lab to have the test done at a later time.    Video visits are billed at different rates depending on your insurance coverage.  Please reach out to your insurance provider with any questions.    If during the course of the call the physician/provider feels a video visit is not appropriate, you will not be charged for this service.\"    Patient has given verbal consent for Video visit? YES  How would you like to obtain your AVS? MyChart  If you are dropped from the video visit, the video invite should be resent to: [email or web address]  Will anyone else be joining your video visit? Yes akin Eli email link to [email or web address]        Video-Visit Details    Type of service:  Video Visit    Video Start Time: 1000  Video End Time: 1040    Originating Location (pt. Location): Home     Distant Location (provider location):  [facility] NEUROLOGY     Platform used for Video Visit: Omega Garza, EMT        "

## 2020-09-01 NOTE — LETTER
9/1/2020       RE: Cristel Turk  [address] [place] [address]     Dear Colleague,    Thank you for referring your patient, Cristel Turk, to the [facility] NEUROLOGY at [facility]. Please see a copy of my visit note below.    [facility] Neurology Follow Up Visit    Cristel Turk MRN# [id number]   Age: 60 year old YOB: 1960     Brief history of symptoms: The patient was initially seen in neurologic consultation on 2/26/2020 for evaluation of memory loss/second opinion. Please see the comprehensive neurologic consultation note from that date in the Epic records for details. The patient had prior w/up through Neurologist on 7/17/2019 with poor memory testing being reported and w/up including EEG and neuropsychological testing.  MRI brain from 2019 compared to 2006 showed normal seize/shape/contour for age of ventricles/sulci/gyri.  Neuropsychological testing showed characteristic cognitive deficits for neurodegenerative process, questionable Alzheimer's Disease. OT assessment was done 12/2019, showing MoCA of 15/30     LP was done 11/2019 with no major findings.  My initial impression was for MCI amestic type, and further recommendations for OT, SW, Sleep study, pychiatric management of depression, and continuing of Donepezil 10 mg at bedtime was made.    Interval history: The patient has been taking anti-anxiety medications as per her PCP.  No sleep study was done.  There was no evaluation through an occupational therapist.  The anti-anxiety medications aren't necessarily helping, but it is unclear that the patient is taking her medications consistently.  There are no new neurological symptoms to speak of, reported from family or the patient.     Medications:     Current Outpatient Medications   Medication Sig     aspirin 81 MG tablet Take 1 tablet (81 mg) by mouth daily     donepezil (ARICEPT) 10 MG tablet Take 10 mg by mouth At Bedtime     multivitamin w/minerals  (MULTI-VITAMIN) tablet Take 1 tablet by mouth daily   - Sertraline - 50 mg QHS     Review of Systems:   A comprehensive 10 point review of systems (constitutional, ENT, cardiac, peripheral vascular, lymphatic, respiratory, GI, , Musculoskeletal, skin, Neurological) was performed and found to be negative except as described in this note.          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  I discussed with the family and patient today that I believe the patient has a mild to major neurocognitive disorder which will likely progress into Alzheimer's dementia with repeated evaluations.  The patient is responding well/has minimal side-effects with Donepezil and we could add no Memantine to see if helps as an adjunctive agent to donepezil.  She may benefit from repeated neuropsychological evaluation in early 2021 to see what/if any progression has occurred.  She may also benefit from seeing a dementia specialist given her age of onset and prior testing.  These tests will be considered on our follow up in early 2021.     Plan:  Memantine 5 mg QAM  OT for CPT  Sleep study    Follow up in Neurology clinic in 4-6 months or earlier as needed should new concerns arise.    PITO Wen D.O.   of Neurology      Greater than 50% of the total time (45 min) in this patient encounter was spent on counseling and/or coordination of care. We reviewed diagnostic results, impressions, and discussed other possible tests if symptoms do not improve. We discussed the implications of the diagnosis, as well as risks and benefits of management options. We reviewed treatment instructions and our scheduled follow-up as specified in the discharge plan. We also discussed the importance of compliance with the chosen course of  "treatment. The patient is in agreement with this plan and has no further questions.      Cristel Turk is a 60 year old female who is being evaluated via a billable video visit.      The patient has been notified of following:     \"This video visit will be conducted via a call between you and your physician/provider. We have found that certain health care needs can be provided without the need for an in-person physical exam.  This service lets us provide the care you need with a video conversation.  If a prescription is necessary we can send it directly to your pharmacy.  If lab work is needed we can place an order for that and you can then stop by our lab to have the test done at a later time.    Video visits are billed at different rates depending on your insurance coverage.  Please reach out to your insurance provider with any questions.    If during the course of the call the physician/provider feels a video visit is not appropriate, you will not be charged for this service.\"    Patient has given verbal consent for Video visit? YES  How would you like to obtain your AVS? MyChart  If you are dropped from the video visit, the video invite should be resent to: [email or web address]  Will anyone else be joining your video visit? Yes son Sreedhar email link to [email or web address]        Video-Visit Details    Type of service:  Video Visit    Video Start Time: 1000  Video End Time: 1040    Originating Location (pt. Location): Home     Distant Location (provider location):  [facility] NEUROLOGY     Platform used for Video Visit: Omega Garza, EMT          Again, thank you for allowing me to participate in the care of your patient.      Sincerely,    Gómez Wen, DO      "

## 2020-09-01 NOTE — PROGRESS NOTES
[facility] Neurology Follow Up Visit    Cristel Turk MRN# [id number]   Age: 60 year old YOB: 1960     Brief history of symptoms: The patient was initially seen in neurologic consultation on 2/26/2020 for evaluation of memory loss/second opinion. Please see the comprehensive neurologic consultation note from that date in the Epic records for details. The patient had prior w/up through Neurologist on 7/17/2019 with poor memory testing being reported and w/up including EEG and neuropsychological testing.  MRI brain from 2019 compared to 2006 showed normal seize/shape/contour for age of ventricles/sulci/gyri.  Neuropsychological testing showed characteristic cognitive deficits for neurodegenerative process, questionable Alzheimer's Disease. OT assessment was done 12/2019, showing MoCA of 15/30     LP was done 11/2019 with no major findings.  My initial impression was for MCI amestic type, and further recommendations for OT, SW, Sleep study, pychiatric management of depression, and continuing of Donepezil 10 mg at bedtime was made.    Interval history: The patient has been taking anti-anxiety medications as per her PCP.  No sleep study was done.  There was no evaluation through an occupational therapist.  The anti-anxiety medications aren't necessarily helping, but it is unclear that the patient is taking her medications consistently.  There are no new neurological symptoms to speak of, reported from family or the patient.     Medications:     Current Outpatient Medications   Medication Sig     aspirin 81 MG tablet Take 1 tablet (81 mg) by mouth daily     donepezil (ARICEPT) 10 MG tablet Take 10 mg by mouth At Bedtime     multivitamin w/minerals (MULTI-VITAMIN) tablet Take 1 tablet by mouth daily   - Sertraline - 50 mg QHS     Review of Systems:   A comprehensive 10 point review of systems (constitutional, ENT, cardiac, peripheral vascular, lymphatic, respiratory, GI, , Musculoskeletal, skin, Neurological) was  performed and found to be negative except as described in this note.          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  I discussed with the family and patient today that I believe the patient has a mild to major neurocognitive disorder which will likely progress into Alzheimer's dementia with repeated evaluations.  The patient is responding well/has minimal side-effects with Donepezil and we could add no Memantine to see if helps as an adjunctive agent to donepezil.  She may benefit from repeated neuropsychological evaluation in early 2021 to see what/if any progression has occurred.  She may also benefit from seeing a dementia specialist given her age of onset and prior testing.  These tests will be considered on our follow up in early 2021.     Plan:  Memantine 5 mg QAM  OT for CPT  Sleep study    Follow up in Neurology clinic in 4-6 months or earlier as needed should new concerns arise.    PITO Wen D.O.   of Neurology      Greater than 50% of the total time (45 min) in this patient encounter was spent on counseling and/or coordination of care. We reviewed diagnostic results, impressions, and discussed other possible tests if symptoms do not improve. We discussed the implications of the diagnosis, as well as risks and benefits of management options. We reviewed treatment instructions and our scheduled follow-up as specified in the discharge plan. We also discussed the importance of compliance with the chosen course of treatment. The patient is in agreement with this plan and has no further questions.

## 2020-09-02 ENCOUNTER — MEDICAL CORRESPONDENCE (OUTPATIENT)
Dept: HEALTH INFORMATION MANAGEMENT | Facility: CLINIC | Age: 60
End: 2020-09-02

## 2020-09-02 RX ORDER — MEMANTINE HYDROCHLORIDE 5 MG/1
5 TABLET ORAL
Qty: 28 TABLET | Refills: 3 | Status: SHIPPED | OUTPATIENT
Start: 2020-09-02 | End: 2020-12-28

## 2020-09-03 ENCOUNTER — TELEPHONE (OUTPATIENT)
Dept: NEUROLOGY | Facility: CLINIC | Age: 60
End: 2020-09-03

## 2020-09-03 ENCOUNTER — CARE COORDINATION (OUTPATIENT)
Dept: NEUROLOGY | Facility: CLINIC | Age: 60
End: 2020-09-03

## 2020-09-03 NOTE — TELEPHONE ENCOUNTER
I called Kemal back. I left a voicemail we could fax Cristel's sleep study referral to which ever facility is convenient for them. If they would like to go to [facility] I ask they verify this is a service they offer and let us know what the fax number is. Then we can send her order over.     Melonie BUSCH

## 2020-09-03 NOTE — TELEPHONE ENCOUNTER
[facility] Call Center    Phone Message    May a detailed message be left on voicemail: yes     Reason for Call: Other: Patients spouse Kemal calling in regards to sleep study that was ordered by Dr. Wen wondering if patient can get sleep study done over at [facility] rather than [facility].     Please advise and call Keaml back.    Action Taken: Other:  NEUROLOGY     Travel Screening: Not Applicable

## 2020-09-03 NOTE — TELEPHONE ENCOUNTER
[facility] Call Center    Phone Message    May a detailed message be left on voicemail: yes     Reason for Call: Other: Kemal calling in returning missed call from Melonie Wakefield, RN   please call back, thank you  - the fax number requested by Melonie Wakefield, JO-ANN is- [phone] for the sleep study.          Action Taken: Message routed to:  [facility] neuro     Travel Screening: Not Applicable

## 2020-09-03 NOTE — TELEPHONE ENCOUNTER
I gave Kemal a call back. I let him know I faxed the sleep study referral to [facility] at the number provided fax: [phone]. He is going to look into a rehab facility near them for us to send Cristel's OT referral. He will call back with the name and fax for the facility once he decides.     Melonie BUSCH

## 2020-09-22 ENCOUNTER — TRANSFERRED RECORDS (OUTPATIENT)
Dept: HEALTH INFORMATION MANAGEMENT | Facility: CLINIC | Age: 60
End: 2020-09-22

## 2020-09-24 ENCOUNTER — DOCUMENTATION ONLY (OUTPATIENT)
Dept: NEUROLOGY | Facility: CLINIC | Age: 60
End: 2020-09-24

## 2020-09-24 NOTE — PROGRESS NOTES
OT Plan of Care from [facility] has been received and placed in providers chart for review and signature

## 2020-09-29 ENCOUNTER — TRANSFERRED RECORDS (OUTPATIENT)
Dept: HEALTH INFORMATION MANAGEMENT | Facility: CLINIC | Age: 60
End: 2020-09-29

## 2020-10-22 ENCOUNTER — CARE COORDINATION (OUTPATIENT)
Dept: NEUROLOGY | Facility: CLINIC | Age: 60
End: 2020-10-22

## 2020-10-22 ENCOUNTER — TELEPHONE (OUTPATIENT)
Dept: NEUROLOGY | Facility: CLINIC | Age: 60
End: 2020-10-22

## 2020-10-22 NOTE — TELEPHONE ENCOUNTER
I called Kemal back. I let him know we did not receive the sleep study results. We do have a note we received the OT plan. I will work on retrieving the sleep study report.     I called [facility] and was told I can fax request to [phone]. Request faxed.     Once results received we will call pt to schedule follow up with Dr. Wen.     Melonie BUSCH

## 2020-10-22 NOTE — PROGRESS NOTES
Records request sent to [facility] at fax: [phone] asking pt OT note.     Records request to [facility] at fax: [phone] asking for pt sleep study report.     Once we receive pt would like to schedule follow up with Dr. Wen.     Melonie BUSCH

## 2020-10-22 NOTE — TELEPHONE ENCOUNTER
[facility] Call Center    Phone Message    May a detailed message be left on voicemail: yes     Reason for Call: Other: Kemal calling in regards to sleep study and OT that patient had done a couple weeks ago. Kemal is calling to check if Dr. Wen has received the results/information from these tests. States that patient had the sleep study done at [facility] about 3 weeks ago and the OT was done at [facility].     Kemal is requesting a call back to confirm Dr. Wen has received these and then would like to make appointment after that has been confirmed.     Please advise       Action Taken: Other: [facility] NEUROLOGY     Travel Screening: Not Applicable

## 2020-10-26 ENCOUNTER — CARE COORDINATION (OUTPATIENT)
Dept: NEUROLOGY | Facility: CLINIC | Age: 60
End: 2020-10-26

## 2020-10-26 NOTE — PROGRESS NOTES
2nd request for records faxed; no records received yet from [facility] or [facility]. Request marked Urgent.     Records request sent to [facility] at fax: [phone] asking pt OT note.      Records request to [facility] at fax: [phone] asking for pt sleep study report.      Once we receive pt would like to schedule follow up with Dr. Wen.      Melonie BUSCH

## 2020-10-28 ENCOUNTER — TELEPHONE (OUTPATIENT)
Dept: NEUROLOGY | Facility: CLINIC | Age: 60
End: 2020-10-28

## 2020-10-28 NOTE — TELEPHONE ENCOUNTER
I left pt spouse Kemal a message that we are still waiting on records from [facility] regarding sleep study. I have sent two requests. If I don't hear back by tomorrow I will try to call [facility].     Melonie BUSCH

## 2020-10-29 ENCOUNTER — TELEPHONE (OUTPATIENT)
Dept: NEUROLOGY | Facility: CLINIC | Age: 60
End: 2020-10-29

## 2020-10-29 ENCOUNTER — CARE COORDINATION (OUTPATIENT)
Dept: NEUROLOGY | Facility: CLINIC | Age: 60
End: 2020-10-29

## 2020-10-29 NOTE — PROGRESS NOTES
[facility] [phone] to request pt OT visit note. They will fax to us today.   Melonie BUSCH

## 2020-10-29 NOTE — TELEPHONE ENCOUNTER
I left a message for pt her records from [facility] have arrived. If they would like to schedule the follow up with Dr. Wen now they can call the clinic to schedule.     Melonie BUSCH

## 2020-10-29 NOTE — PROGRESS NOTES
I called [facility] at  [phone]. I spoke to medical records and they will fax pt sleep study note today.     Melonie BUSCH

## 2020-11-06 ENCOUNTER — DOCUMENTATION ONLY (OUTPATIENT)
Dept: CARE COORDINATION | Facility: CLINIC | Age: 60
End: 2020-11-06

## 2020-11-06 NOTE — PROGRESS NOTES
Social Work Follow-Up  [facility]    Data/Intervention:  Patient Name:  Cristel Turk  /Age:  1960 (60 year old)    Reason for Follow-Up:  Concerns about her Mother    Collaborated With:    -Miriam, Pt's dtr    Intervention/Education/Resources Provided:  Miriam called to get some advice about how best to help her Mother in her current situation. She's concerned her Mother is home alone most of the time. Her spouse Kemal hasn't sent in the application for MA/CADI to be able to start services because he's heard horror stories from people about having services at home. He hasn't started an application as far as Miriam knows for SSDI either. Pt needs to be determined to be disabled by either sliding scale or the State to receive CADI services.   She indicated that Lul reports that sometimes Cristel doesn't know who he is.   Legal paperwork is not completed but has been started re HCD, POA$. Communication is difficult with Lul. They have only been  for about 4-5 years per Miriam.  Discussed these issues and offered some suggestions re moving forward. They don't have a final dx yet for Cristel but have an upcoming meeting with Dr Wen.  Cristel's sister who has worked in LTC has offered to have Cristel come and stay with her. Encouraged Miriam to try to facilitate that plan as at least a short term option. She plans to try to move in that direction very soon. She will also assist with an appl for SSDI.  Discussed adult day care as an option for Kemal to pay for at approx $100/day but due to the pandemic, those programs are not operating at all or at limited capacity.    Assessment/Plan:  Difficult family dynamics and concern about spouse's ability to provide the care Cristel will be needing while also being employed full time. Encouraged a non confronting approach with Kemal to help make plans together in Cristel's best interest.   I will discuss with Dr Wen.     Previously  provided patient/family with writer's contact information and availability.       RUBINA Jones, [facility]    [facility]  [phone]

## 2020-11-17 ENCOUNTER — VIRTUAL VISIT (OUTPATIENT)
Dept: NEUROLOGY | Facility: CLINIC | Age: 60
End: 2020-11-17
Payer: COMMERCIAL

## 2020-11-17 DIAGNOSIS — G30.0 EARLY ONSET ALZHEIMER'S DEMENTIA WITHOUT BEHAVIORAL DISTURBANCE (H): Primary | ICD-10-CM

## 2020-11-17 DIAGNOSIS — F02.80 EARLY ONSET ALZHEIMER'S DEMENTIA WITHOUT BEHAVIORAL DISTURBANCE (H): Primary | ICD-10-CM

## 2020-11-17 PROCEDURE — 99215 OFFICE O/P EST HI 40 MIN: CPT | Mod: GT | Performed by: PSYCHIATRY & NEUROLOGY

## 2020-11-17 RX ORDER — CITALOPRAM HYDROBROMIDE 10 MG/1
TABLET ORAL
Qty: 261 TABLET | Refills: 0 | Status: SHIPPED | OUTPATIENT
Start: 2020-11-17 | End: 2022-01-29

## 2020-11-17 NOTE — PROGRESS NOTES
[facility] Neurology Follow Up Visit    Cristel Turk MRN# [id number]   Age: 60 year old YOB: 1960     Brief history of symptoms: The patient was initially seen in neurologic consultation on 2/26/2020 and for follow up 9/1/2020 for evaluation of memory decline. Please see the comprehensive neurologic consultation note from that date in the Epic records for details. Overall w/up stemming from 7/17/2019 for poor memory included EEG (no seizure), neuropsychological testing (characterstic for neurodegenerative process, early onset Alzheimer's, MRI brain (no major atrophy), OT assessment (MoCA 15/30), and LP (11/2019, no major findings).  After my initial meeting with the patient, I recommended further testing with OT, Social work, a sleep study, psychiatric management for depression, and to start Donepezil 10 mg at bedtime.  After follow up, there was concern that the patient wasn't taking her medications appropriately, and concern that multiple evaluations were still not done.  Repeat recommendations for OT, SW, and sleep study were made, as well as starting memantine 5 mg HS.    The patient had a SLUMS score of 5/30 and a CPT score of 4.0/5.6 on 9/22/2020 with OT.    Sleep study (9/29/2020) showed mild JHON, and if symptoms of daytime fatigue were present a further evaluation of the study for possible treatment with CPAP was recommended through sleep medicine.    Social work visit on 11/6/2020 from [facility] notes that the patient's spouse had yet to complete MA/CADI/SSDI application for services due to fear of the services leading to worsened safety for Cristel.  The patient's daugher, Miriam, was concerned about her mother's current situation. The family was not aware of their mother's diagnosis.  The patient's sister, Cristel, who worked as an LTC provider, was asking for her sister to live with her for safety.  It was reported that family dynamics were complicated but there was an overall concern for  Kemal's (patient's spouse) ability to care for Cristel while also working full time.    Interval history: The patient has moved in with her sister for the last week.  She has forgotten her 's name and at times their relationship.  Lul (the patient's ) is distraught and is having difficulties with the loss of his partner but is understanding of the progression of her disease and the need for help from other family members.  The patient's daughter and son are present today, and are understanding of her condition/diagnosis.  At this time, the patient is in a safe environment, but the family is moving to have multiple disability forms filled out so that further cares can be given and paid for.    When talking with the patient's daughter, it is mentioned that early dementia runs on the patients maternal side.     Medications:     Current Outpatient Medications   Medication Sig     aspirin 81 MG tablet Take 1 tablet (81 mg) by mouth daily     donepezil (ARICEPT) 10 MG tablet Take 10 mg by mouth At Bedtime     escitalopram (LEXAPRO) 5 MG tablet - not taking Take 1 tablet by mouth daily     memantine (NAMENDA) 5 MG tablet Take 1 tablet (5 mg) by mouth daily before breakfast     multivitamin w/minerals (MULTI-VITAMIN) tablet Take 1 tablet by mouth daily     sertraline (ZOLOFT) 50 MG tablet - stopped Take 1 tablet by mouth daily     vitamin C (ASCORBIC ACID) 100 MG tablet Take 100 mg by mouth daily      Physical Exam:   General: Seated comfortably in no acute distress. Present with  and daughter and son.  HEENT: Neck supple with normal range of motion.   Skin: No rashes         Assessment and Plan:   Assessment:  Alzheimer Disease, early onset    The patient has had progression of her memory deficits, daily function, and is requiring more supervision for daily living to the point of near 24 hour supervision.  While typical amnestic Alzheimer disease can occur at ages under 65, I do questions whether the  patient has a phenotypic variant of early onset Alzheimer disease could be present.  Given the lack of behavioral symptoms, visual symptoms, or speech related symptoms (can occur in roughly 1/3 of patients with early-onset Alzheimer disease), it may be helpful for the patient to see our dementia specialist to consider possible genetic testing for familial forms of Alzheimer disease, advanced imaging, or further CSF analysis.    Given that the patient wasn't taking medications as directed for the last half year, it is difficult to really indicate whether she benefited from Donepezil, or memantine.  She has stopped taking sertraline for depression or sleep, but wasn't necessarily taking this as prescribed in the past either.  I do think continuing the Donepezil and Memantine would be helpful, but would consider starting a slower titration of citalopram for depression instead of restarting Seroquel.    Plan:  Referral to dementia specialist for consideration of genetic counseling  Donepezil 10 mg at bedtime  Memantine 5 mg at bedtime  Citalopram 10 mg at bedtime for 3 weeks, then 20 mg QHS    Follow up in Neurology clinic in 6 months or earlier as needed should new concerns arise.    PITO Wen D.O.   of Neurology      Greater than 50% of the total time (40 min) in this patient encounter was spent on counseling and/or coordination of care. We reviewed diagnostic results, impressions, and discussed other possible tests if symptoms do not improve. We discussed the implications of the diagnosis, as well as risks and benefits of management options. We reviewed treatment instructions and our scheduled follow-up as specified in the discharge plan. We also discussed the importance of compliance with the chosen course of treatment. The patient is in agreement with this plan and has no further questions.

## 2020-11-17 NOTE — PROGRESS NOTES
"Cristel Turk is a 60 year old female who is being evaluated via a billable video visit.      The patient has been notified of following:     \"This video visit will be conducted via a call between you and your physician/provider. We have found that certain health care needs can be provided without the need for an in-person physical exam.  This service lets us provide the care you need with a video conversation.  If a prescription is necessary we can send it directly to your pharmacy.  If lab work is needed we can place an order for that and you can then stop by our lab to have the test done at a later time.    Video visits are billed at different rates depending on your insurance coverage.  Please reach out to your insurance provider with any questions.    If during the course of the call the physician/provider feels a video visit is not appropriate, you will not be charged for this service.\"    Patient has given verbal consent for Video visit? Yes  How would you like to obtain your AVS? KellBenxhart  If you are dropped from the video visit, the video invite should be resent to: Other e-mail: [email or web address]  Will anyone else be joining your video visit? Yes: Sreedhar (son). How would they like to receive their invitation? Text to cell phone: [phone]        Video-Visit Details    Type of service:  Video Visit    Video Start Time: 10am  Video End Time: 11:10 AM    Originating Location (pt. Location): Home    Distant Location (provider location):  [facility] NEUROLOGY [facility]     Platform used for Video Visit: Omega Miller        "

## 2020-11-17 NOTE — LETTER
11/17/2020       RE: Cristel Turk  [address] [place] [address]     Dear Colleague,    Thank you for referring your patient, Cristel Turk, to the [facility] NEUROLOGY CLIN[facility] [place] [facility]. Please see a copy of my visit note below.    [facility] Neurology Follow Up Visit    Cirstel Turk MRN# [id number]   Age: 60 year old YOB: 1960     Brief history of symptoms: The patient was initially seen in neurologic consultation on 2/26/2020 and for follow up 9/1/2020 for evaluation of memory decline. Please see the comprehensive neurologic consultation note from that date in the Epic records for details. Overall w/up stemming from 7/17/2019 for poor memory included EEG (no seizure), neuropsychological testing (characterstic for neurodegenerative process, early onset Alzheimer's, MRI brain (no major atrophy), OT assessment (MoCA 15/30), and LP (11/2019, no major findings).  After my initial meeting with the patient, I recommended further testing with OT, Social work, a sleep study, psychiatric management for depression, and to start Donepezil 10 mg at bedtime.  After follow up, there was concern that the patient wasn't taking her medications appropriately, and concern that multiple evaluations were still not done.  Repeat recommendations for OT, SW, and sleep study were made, as well as starting memantine 5 mg HS.    The patient had a SLUMS score of 5/30 and a CPT score of 4.0/5.6 on 9/22/2020 with OT.    Sleep study (9/29/2020) showed mild JHON, and if symptoms of daytime fatigue were present a further evaluation of the study for possible treatment with CPAP was recommended through sleep medicine.    Social work visit on 11/6/2020 from ROHIT Willett notes that the patient's spouse had yet to complete MA/CADI/SSDI application for services due to fear of the services leading to worsened safety for Cristel.  The patient's daugher, Miriam, was concerned  about her mother's current situation. The family was not aware of their mother's diagnosis.  The patient's sister, Cristel, who worked as an LTC provider, was asking for her sister to live with her for safety.  It was reported that family dynamics were complicated but there was an overall concern for Kemal's (patient's spouse) ability to care for Cristel while also working full time.    Interval history: The patient has moved in with her sister for the last week.  She has forgotten her 's name and at times their relationship.  Lul (the patient's ) is distraught and is having difficulties with the loss of his partner but is understanding of the progression of her disease and the need for help from other family members.  The patient's daughter and son are present today, and are understanding of her condition/diagnosis.  At this time, the patient is in a safe environment, but the family is moving to have multiple disability forms filled out so that further cares can be given and paid for.    When talking with the patient's daughter, it is mentioned that early dementia runs on the patients maternal side.     Medications:     Current Outpatient Medications   Medication Sig     aspirin 81 MG tablet Take 1 tablet (81 mg) by mouth daily     donepezil (ARICEPT) 10 MG tablet Take 10 mg by mouth At Bedtime     escitalopram (LEXAPRO) 5 MG tablet - not taking Take 1 tablet by mouth daily     memantine (NAMENDA) 5 MG tablet Take 1 tablet (5 mg) by mouth daily before breakfast     multivitamin w/minerals (MULTI-VITAMIN) tablet Take 1 tablet by mouth daily     sertraline (ZOLOFT) 50 MG tablet - stopped Take 1 tablet by mouth daily     vitamin C (ASCORBIC ACID) 100 MG tablet Take 100 mg by mouth daily      Physical Exam:   General: Seated comfortably in no acute distress. Present with  and daughter and son.  HEENT: Neck supple with normal range of motion.   Skin: No rashes         Assessment and Plan:    Assessment:  Alzheimer Disease, early onset    The patient has had progression of her memory deficits, daily function, and is requiring more supervision for daily living to the point of near 24 hour supervision.  While typical amnestic Alzheimer disease can occur at ages under 65, I do questions whether the patient has a phenotypic variant of early onset Alzheimer disease could be present.  Given the lack of behavioral symptoms, visual symptoms, or speech related symptoms (can occur in roughly 1/3 of patients with early-onset Alzheimer disease), it may be helpful for the patient to see our dementia specialist to consider possible genetic testing for familial forms of Alzheimer disease, advanced imaging, or further CSF analysis.    Given that the patient wasn't taking medications as directed for the last half year, it is difficult to really indicate whether she benefited from Donepezil, or memantine.  She has stopped taking sertraline for depression or sleep, but wasn't necessarily taking this as prescribed in the past either.  I do think continuing the Donepezil and Memantine would be helpful, but would consider starting a slower titration of citalopram for depression instead of restarting Seroquel.    Plan:  Referral to dementia specialist for consideration of genetic counseling  Donepezil 10 mg at bedtime  Memantine 5 mg at bedtime  Citalopram 10 mg at bedtime for 3 weeks, then 20 mg QHS    Follow up in Neurology clinic in 6 months or earlier as needed should new concerns arise.    PITO Wen D.O.   of Neurology    Greater than 50% of the total time (40 min) in this patient encounter was spent on counseling and/or coordination of care. We reviewed diagnostic results, impressions, and discussed other possible tests if symptoms do not improve. We discussed the implications of the diagnosis, as well as risks and benefits of management options. We reviewed treatment instructions and our  "scheduled follow-up as specified in the discharge plan. We also discussed the importance of compliance with the chosen course of treatment. The patient is in agreement with this plan and has no further questions.          Cristel Turk is a 60 year old female who is being evaluated via a billable video visit.      The patient has been notified of following:     \"This video visit will be conducted via a call between you and your physician/provider. We have found that certain health care needs can be provided without the need for an in-person physical exam.  This service lets us provide the care you need with a video conversation.  If a prescription is necessary we can send it directly to your pharmacy.  If lab work is needed we can place an order for that and you can then stop by our lab to have the test done at a later time.    Video visits are billed at different rates depending on your insurance coverage.  Please reach out to your insurance provider with any questions.    If during the course of the call the physician/provider feels a video visit is not appropriate, you will not be charged for this service.\"    Patient has given verbal consent for Video visit? Yes  How would you like to obtain your AVS? [email or web address]  If you are dropped from the video visit, the video invite should be resent to: Other e-mail: [email or web address]  Will anyone else be joining your video visit? Yes: Sreedhar (son). How would they like to receive their invitation? Text to cell phone: [phone]    Video-Visit Details    Type of service:  Video Visit    Video Start Time: 10am  Video End Time: 11:10 AM    Originating Location (pt. Location): Home    Distant Location (provider location):  [facility] NEUROLOGY [facility]     Platform used for Video Visit: Omega Miller          "

## 2020-11-20 ENCOUNTER — PATIENT OUTREACH (OUTPATIENT)
Dept: CARE COORDINATION | Facility: CLINIC | Age: 60
End: 2020-11-20

## 2020-11-20 NOTE — PROGRESS NOTES
Social Work Follow-Up  [facility]    Data/Intervention:  Patient Name:  Cristel Turk  /Age:  1960 (60 year old)    Reason for Follow-Up:  Care for Cristel    Collaborated With:    -Cristel's dtr Miriam and son Sreedhar  -Dr Wen    Intervention/Education/Resources Provided:  I have spoken with Miriam, Pt's dtr and communicated info via email with Miriam and Pt's son Sreedhar since Cristel's visit this week with Dr Wen which they attended. Cristel is staying with her sister right now but it's a temporary situation until they can find an appropriate care facility for Cristel.   Kemal communicated with them that he wishes to turn over responsibility for Cristel to her children and plans to divorce her for financial reasons. Miriam and Sreedhar have been working with an  who assisted them in securing POA$.  Encouraged them to contact [facility] for assistance with finding an appropriate facility. Discussed memory care assisted living and residential care as options. Also discussed in the meantime, hiring home care assistance to help Pt when her sister is working (she works from home).  Also reviewed some of the medical assistance asset guidelines to prepare for the future. She has enough assets to pay for her own care for a few years likely.    Assessment/Plan:  Dtr/son are making progress on planning care for Cristel and she is getting care from her sister for now. Provided support in all that they are doing.    Previously provided patient/family with writer's contact information and availability.       RUBINA Jones, [facility]    [facility]  [phone]

## 2020-12-01 ENCOUNTER — PATIENT OUTREACH (OUTPATIENT)
Dept: CARE COORDINATION | Facility: CLINIC | Age: 60
End: 2020-12-01

## 2020-12-01 NOTE — PROGRESS NOTES
Social Work Follow-Up  [facility]    Data/Intervention:  Patient Name:  Cristel Turk  /Age:  1960 (60 year old)    Reason for Follow-Up:  Received phone call from Pt's dtr Miriam    Collaborated With:    -Miriam Taveras, Pt's dtr    Intervention/Education/Resources Provided:  Miriam called requesting info/advice re insurance for her Mother. She anticipates her insurance coverage she currently has thru her husb will end when he divorces her. Discussed using [employer] and later Medicare after 2 years approved for SSDI. She completed the application for SSDI. We discussed timelines for that and that Soc Sec will determine date of disability. Miriam is getting a handle on Cristel's finances. She will be paying privately when she starts out at a memory care facility.     They are making progress on finding a facility for Cristel. Cristel's sister plans to keep her in her home until after the holidays. Miriam indicates Cristel is aware that her husb is  her. Miriam indicates she has had Cristel at her home too and indicates that she needs assistance/prompting for all of her ADLs and would just sit if no one provided direction.    Assessment/Plan:  Miriam is moving forward with plans for her Mother. The whole situation is difficult to deal with- her Mother's failing brain function, her husb's divorce, Miriam has 3 young children which is challenging enough. But she is coping well.    Previously provided patient/family with writer's contact information and availability.       Antonina Jacobs, MSW, [facility]    [facility]  [phone]

## 2020-12-28 DIAGNOSIS — G30.0 EARLY ONSET ALZHEIMER'S DEMENTIA WITHOUT BEHAVIORAL DISTURBANCE (H): ICD-10-CM

## 2020-12-28 DIAGNOSIS — F02.80 EARLY ONSET ALZHEIMER'S DEMENTIA WITHOUT BEHAVIORAL DISTURBANCE (H): ICD-10-CM

## 2020-12-28 RX ORDER — MEMANTINE HYDROCHLORIDE 5 MG/1
5 TABLET ORAL
Qty: 28 TABLET | Refills: 3 | Status: SHIPPED | OUTPATIENT
Start: 2020-12-28 | End: 2022-03-03

## 2020-12-28 NOTE — TELEPHONE ENCOUNTER
[facility] Call Center    Phone Message    May a detailed message be left on voicemail: yes     Reason for Call: Medication Refill Request    Has the patient contacted the pharmacy for the refill? Yes   Name of medication being requested: memantine (NAMENDA) 5 MG tablet  Provider who prescribed the medication: Dr. Wen   Pharmacy: [facility] [address], [place] [address]  Date medication is needed: as soon as possible          Action Taken: Message routed to:  [facility]  neuro     Travel Screening: Not Applicable

## 2020-12-28 NOTE — TELEPHONE ENCOUNTER
Rx Authorization:    Requested Medication/ Dose: Namenda 5MG    Date last refill ordered: 9/2/20    Quantity ordered: 25 tabs    # refills: 3    Date of last clinic visit with ordering provider: 11/17/20    Date of next clinic visit with ordering provider: F/U 7 months    All pertinent protocol data (lab date/result):     Include pertinent information from patients message:

## 2021-03-02 ENCOUNTER — TELEPHONE (OUTPATIENT)
Dept: NEUROLOGY | Facility: CLINIC | Age: 61
End: 2021-03-02

## 2021-03-02 NOTE — TELEPHONE ENCOUNTER
I returned call to Miriam. She said she was called in November or December to schedule pt visit with dementia clinic but was not ready to schedule at that time. They are now ready and want to set up a visit. I will check with Dr. Wen to see if there is a specific provider he wants pt to see. Then will have scheduling call Miriam back to set up visit.     Melonie BUSCH

## 2021-03-02 NOTE — TELEPHONE ENCOUNTER
[facility] Call Center    Phone Message    May a detailed message be left on voicemail: yes     Reason for Call: Other: Miriam calling to request a call back for clarification on the referral to a dementia specialish. Please call her at your earliest convenience to discuss.     Action Taken: Message routed to:  [facility]OLOG[facility]    Travel Screening: Not Applicable

## 2021-04-09 ENCOUNTER — VIRTUAL VISIT (OUTPATIENT)
Dept: NEUROLOGY | Facility: CLINIC | Age: 61
End: 2021-04-09
Attending: PSYCHIATRY & NEUROLOGY
Payer: COMMERCIAL

## 2021-04-09 DIAGNOSIS — F02.80 EARLY ONSET ALZHEIMER'S DEMENTIA WITHOUT BEHAVIORAL DISTURBANCE (H): Primary | ICD-10-CM

## 2021-04-09 DIAGNOSIS — G30.0 EARLY ONSET ALZHEIMER'S DEMENTIA WITHOUT BEHAVIORAL DISTURBANCE (H): Primary | ICD-10-CM

## 2021-04-09 NOTE — PROGRESS NOTES
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 She presents with her daughter and son.    She is able to read books out loud to her grand kids. No hallucinations. No dream enactment behavior. No paranoia, although she had 1 episode of taping newspaper to her room for unclear reasons, perhaps to keep light from entering. No fluctuations. Sense of smell is good. No lightheadedness when standing.    No trouble with balance, using hands or feet, or performing learned motor movements.    No change in personality, loss of social decorum, obsessive/ritualistic behavior, loss of empathy, or change in diet.    iADLs need to be done by or with others. She is living in a memory care home.    She has been diagnosed with early onset Alzheimer's disease with a compatible clinical history, MRI and rule-out of alternative causes with standard blood lab and CSF testing (paraneoplastic testing included). Note that Alzheimer's disease biomarkers have not been obtained.    She was trialed on donepezil and reported positive response.    No history of migraine headaches, although more recently she had mild OTC medication responsive heaches. No seizures. No stroke-like events.     MEDS:  Current Outpatient Medications   Medication Sig Dispense Refill     aspirin 81 MG tablet Take 1 tablet (81 mg) by mouth daily 30 tablet OTC     citalopram (CELEXA) 10 MG tablet Take 1 tablet (10 mg) by mouth daily for 21 days, THEN 2 tablets (20 mg) daily. 261 tablet 0     donepezil (ARICEPT) 10 MG tablet Take 10 mg by mouth At Bedtime       escitalopram (LEXAPRO) 5 MG tablet Take 1 tablet by mouth daily       memantine (NAMENDA) 5 MG tablet Take 1 tablet (5  "mg) by mouth daily before breakfast 28 tablet 3     multivitamin w/minerals (MULTI-VITAMIN) tablet Take 1 tablet by mouth daily       sertraline (ZOLOFT) 50 MG tablet Take 1 tablet by mouth daily       vitamin C (ASCORBIC ACID) 100 MG tablet Take 100 mg by mouth daily          PROBLEM LIST:  Patient Active Problem List   Diagnosis   (none) - all problems resolved or deleted        PMHx:  Past Medical History:   Diagnosis Date     Memory loss        FHx:  Maternal family history of dementia:  Her maternal grandfather had Alzheimer's disease clinically diagnosed  Her mother's sister had clinically diagnosed Alzheimer's and passed away recently in her 70's  Her mother has 2 or 3 other aunts who are thought to be healthy  Her mother is still alive and healthy from a cognitive standpoint  She is the oldest of 6 siblings who have no cognitive or neuropsychiatric diagnoses.    The father's side of the family is not totally known.    SHx:  No head trauma history  No alcohol, tobacco, recreational drugs, CBD or THC    Physical Exam:  MMSE: ; -1 year \"... or \"; -1 Season \"February, March, maybe April?\"; -1 calendar day; -1 County; -1 city; -1 clinic affiliation \"the only one I know about is [facility]\" -2 instructions, -1 repetition, -3 delayed recall (does not benefit from cuing).     Neuro: Alert, awake, appropriate, able to have a normal 2 way conversation. Mild word finding difficulty. No bradykinesia. Calculation intact (5+3, 5x7); ideomotor apraxia (violin, flip coin with thumb) impaired; no loss of word knowledge; spelling intact. Normal rapid alternating movements of the fingers. Normal saccadic initiation, velocity and amplitude. Normal gait with normal posture, arm swing and stride length.      Objective Testing:  EE2019  - Abnormal, diffuse generalized slowing as well as frontal delta slowing consistent with diffuse brain dysfunction such as seen a with encephalopathies of multiple causes " (toxic, metabolic, degenerative). No epileptiform activity recorded.    Laboratory:  B12 947; normal MMA  TSH 3.34  MIC kelli negative  HIV negative  Normal CMP    LP: 11/2019  Cytology, Lyme, autoimmune dementia panel, oligoclonal bands, VDRL- Normal  Protein 28, glucose 62, WBC 0, RBC 0  Neuron specific amylase - 12.8 (high)  Total tau was elevated per [facility] labs (1046)  RT-QuiC was negative  (Had post-LP headache)    I personally reviewed the brain MRI from 6/2019. There is dorsolateral frontoparietal atrophy with a milder degree of medial temporal atrophy. No abnormal T2 white matter lesions. No abnormal DWI or post-contrast signal. Susceptibility imaging not available.      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 No biomarkers have been obtained.     The main point of discussion today was about family history and different genetic risks for developing Alzheimer's disease. We discussed the autosomal dominant causes of Alzheimer's disease (SUSANA, PSEN1, PSEN2) although these represent only 5-10% of early onset Alzheimer's disease cases and usually cause the disease to present in a patient's 40's. We discussed APOE epsilon 4 and this generally increases the risk 3-fold with 1 copy and 15-fold with 2 copies. This gene is associated with a memory predominant type of Alzheimer's disease that can present in a patient's 60's, which is compatible with Ms. Wilkins's history. We also discussed the polygenic nature of early onset Alzheimer's disease and alternatively the numerous undiscovered rare variants with large effect sizes that we have yet to discover.     We also discussed biomarker confirmation and they are interested in pursuing a study at the [facility] that has amyloid-PET, which would serve as a positive confirmation. I do  not think LP is warranted given previous headache and we already have other rule-out CSF studies.    [ ] I will await the family's decision regarding APOE testing. They would like to think more about pursuing this.   [ ] No changes to medication regimen; she is on optimal medications  [ ] I encouraged continued stimulation through social activities  [ ] Continue exercise    I spent 116 minutes reviewing the chart, personally assessing objective testing, direct patient care, discussing healthy lifestyle, genetic basis of Alzheimer's disease, completing documentation and billing.

## 2021-04-09 NOTE — LETTER
4/9/2021       RE: Cristel Turk  [address]  [facility] [address]     Dear Colleague,    Thank you for referring your patient, Cristel Turk, to the [facility] NEUROSPECIALTIES at [facility]. Please see a copy of my visit note below.    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 She presents with her daughter and son.    She is able to read books out loud to her grand kids. No hallucinations. No dream enactment behavior. No paranoia, although she had 1 episode of taping newspaper to her room for unclear reasons, perhaps to keep light from entering. No fluctuations. Sense of smell is good. No lightheadedness when standing.    No trouble with balance, using hands or feet, or performing learned motor movements.    No change in personality, loss of social decorum, obsessive/ritualistic behavior, loss of empathy, or change in diet.    iADLs need to be done by or with others. She is living in a memory care home.    She has been diagnosed with early onset Alzheimer's disease with a compatible clinical history, MRI and rule-out of alternative causes with standard blood lab and CSF testing (paraneoplastic testing included). Note that Alzheimer's disease biomarkers have not been obtained.    She was trialed on donepezil and reported positive response.    No history of migraine headaches, although more recently she had mild OTC medication responsive heaches. No seizures. No stroke-like events.     MEDS:  Current Outpatient Medications   Medication Sig Dispense Refill     aspirin 81 MG tablet Take 1 tablet (81 mg) by mouth daily 30 tablet OTC     citalopram (CELEXA) 10 MG tablet  "Take 1 tablet (10 mg) by mouth daily for 21 days, THEN 2 tablets (20 mg) daily. 261 tablet 0     donepezil (ARICEPT) 10 MG tablet Take 10 mg by mouth At Bedtime       escitalopram (LEXAPRO) 5 MG tablet Take 1 tablet by mouth daily       memantine (NAMENDA) 5 MG tablet Take 1 tablet (5 mg) by mouth daily before breakfast 28 tablet 3     multivitamin w/minerals (MULTI-VITAMIN) tablet Take 1 tablet by mouth daily       sertraline (ZOLOFT) 50 MG tablet Take 1 tablet by mouth daily       vitamin C (ASCORBIC ACID) 100 MG tablet Take 100 mg by mouth daily          PROBLEM LIST:  Patient Active Problem List   Diagnosis   (none) - all problems resolved or deleted        PMHx:  Past Medical History:   Diagnosis Date     Memory loss        FHx:  Maternal family history of dementia:  Her maternal grandfather had Alzheimer's disease clinically diagnosed  Her mother's sister had clinically diagnosed Alzheimer's and passed away recently in her 70's  Her mother has 2 or 3 other aunts who are thought to be healthy  Her mother is still alive and healthy from a cognitive standpoint  She is the oldest of 6 siblings who have no cognitive or neuropsychiatric diagnoses.    The father's side of the family is not totally known.    SHx:  No head trauma history  No alcohol, tobacco, recreational drugs, CBD or THC    Physical Exam:  MMSE: 18/30; -1 year \"2020... or 2021\"; -1 Season \"February, March, maybe April?\"; -1 calendar day; -1 County; -1 city; -1 clinic affiliation \"the only one I know about is [facility]\" -2 instructions, -1 repetition, -3 delayed recall (does not benefit from cuing).     Neuro: Alert, awake, appropriate, able to have a normal 2 way conversation. Mild word finding difficulty. No bradykinesia. Calculation intact (5+3, 5x7); ideomotor apraxia (violin, flip coin with thumb) impaired; no loss of word knowledge; spelling intact. Normal rapid alternating movements of the fingers. Normal saccadic initiation, velocity and " amplitude. Normal gait with normal posture, arm swing and stride length.      Objective Testing:  EE2019  - Abnormal, diffuse generalized slowing as well as frontal delta slowing consistent with diffuse brain dysfunction such as seen a with encephalopathies of multiple causes (toxic, metabolic, degenerative). No epileptiform activity recorded.    Laboratory:  B12 947; normal MMA  TSH 3.34  MIC kelli negative  HIV negative  Normal CMP    LP: 2019  Cytology, Lyme, autoimmune dementia panel, oligoclonal bands, VDRL- Normal  Protein 28, glucose 62, WBC 0, RBC 0  Neuron specific amylase - 12.8 (high)  Total tau was elevated per [facility] labs (1046)  RT-QuiC was negative  (Had post-LP headache)    I personally reviewed the brain MRI from 2019. There is dorsolateral frontoparietal atrophy with a milder degree of medial temporal atrophy. No abnormal T2 white matter lesions. No abnormal DWI or post-contrast signal. Susceptibility imaging not available.      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 No biomarkers have been obtained.     The main point of discussion today was about family history and different genetic risks for developing Alzheimer's disease. We discussed the autosomal dominant causes of Alzheimer's disease (SUSANA, PSEN1, PSEN2) although these represent only 5-10% of early onset Alzheimer's disease cases and usually cause the disease to present in a patient's 40's. We discussed APOE epsilon 4 and this generally increases the risk 3-fold with 1 copy and 15-fold with 2 copies. This gene is associated with a memory predominant type of Alzheimer's disease that can present in a patient's 60's, which is compatible with Ms. Wilkins's history. We also discussed the polygenic nature of early onset Alzheimer's disease  and alternatively the numerous undiscovered rare variants with large effect sizes that we have yet to discover.     We also discussed biomarker confirmation and they are interested in pursuing a study at the [facility] that has amyloid-PET, which would serve as a positive confirmation. I do not think LP is warranted given previous headache and we already have other rule-out CSF studies.    [ ] I will await the family's decision regarding APOE testing. They would like to think more about pursuing this.   [ ] No changes to medication regimen; she is on optimal medications  [ ] I encouraged continued stimulation through social activities  [ ] Continue exercise    I spent 116 minutes reviewing the chart, personally assessing objective testing, direct patient care, discussing healthy lifestyle, genetic basis of Alzheimer's disease, completing documentation and billing.    Cristel is a 61 year old who is being evaluated via a billable video visit.      How would you like to obtain your AVS? MyChart  If the video visit is dropped, the invitation should be resent by: Send to e-mail at: [email or web address]  Will anyone else be joining your video visit? Yes ,son Sreedhar([email or web address]) and daughter Corky ([email or web address])      Video Start Time: 2:50PM  Video-Visit Details    Type of service:  Video Visit    Video End Time:4PM    Originating Location (pt. Location): Home    Distant Location (provider location):  [facility] NEUROSPECIALTIES     Platform used for Video Visit: Omega

## 2021-04-09 NOTE — PROGRESS NOTES
Cristel is a 61 year old who is being evaluated via a billable video visit.      How would you like to obtain your AVS? MyChart  If the video visit is dropped, the invitation should be resent by: Send to e-mail at: [email or web address]  Will anyone else be joining your video visit? Yes ,son Sreedhar([email or web address]) and daughter Miriam ([email or web address])      Video Start Time: 2:50PM  Video-Visit Details    Type of service:  Video Visit    Video End Time:4PM    Originating Location (pt. Location): Home    Distant Location (provider location):  [facility] NEUROSPECIALTIES     Platform used for Video Visit: Omega

## 2021-04-21 ENCOUNTER — MYC MEDICAL ADVICE (OUTPATIENT)
Dept: NEUROLOGY | Facility: CLINIC | Age: 61
End: 2021-04-21

## 2021-04-21 DIAGNOSIS — F02.80 EARLY ONSET ALZHEIMER'S DEMENTIA WITHOUT BEHAVIORAL DISTURBANCE (H): Primary | ICD-10-CM

## 2021-04-21 DIAGNOSIS — G30.0 EARLY ONSET ALZHEIMER'S DEMENTIA WITHOUT BEHAVIORAL DISTURBANCE (H): Primary | ICD-10-CM

## 2021-05-07 NOTE — TELEPHONE ENCOUNTER
Bryce Hawley, thanks so much for the info. Agreed about the rainbow analogy -- great to know about [facility]. Thanks again for calling them!

## 2021-05-09 ENCOUNTER — HEALTH MAINTENANCE LETTER (OUTPATIENT)
Age: 61
End: 2021-05-09

## 2021-05-10 ENCOUNTER — TELEPHONE (OUTPATIENT)
Dept: NEUROLOGY | Facility: CLINIC | Age: 61
End: 2021-05-10

## 2021-05-10 DIAGNOSIS — Z82.0 FAMILY HISTORY OF ALZHEIMER'S DISEASE: Primary | ICD-10-CM

## 2021-05-10 NOTE — TELEPHONE ENCOUNTER
GENETIC COUNSELING-Neurology  I left a message for Cristel. I had discussed this case with Dr. Dent and Cristel is seeking genetic testing for APOE.  I indicated that our lab only does full sequencing and that for the targeted APOE genotyping, the test would be a sendout to [facility]. I placed this order for [facility]. The estimated cost for this lab to the patient would be ~215 dollars. I had left a message for Cristel on Friday 5/7 to indicate that if she has questions or wants to discuss this testing, she can call me back.    Chandan Wood MS, [facility]  Certified Genetic Counselor

## 2021-06-23 ENCOUNTER — TELEPHONE (OUTPATIENT)
Dept: NEUROLOGY | Facility: CLINIC | Age: 61
End: 2021-06-23

## 2021-06-23 NOTE — TELEPHONE ENCOUNTER
LVM telling pt they can schedule their APOE labs anywhere but [place]/ [facility], recommended [facility], because the results need to be taken to a lab to be resulted within an hour and we cannot do that here. Told pt she can go to any [facility], [facility], or [place] lab, or if she wants to go somewhere else she can call back and I can fax the lab wherever she wants. Provided clinic number for call back

## 2021-08-16 ENCOUNTER — LAB REQUISITION (OUTPATIENT)
Dept: LAB | Facility: CLINIC | Age: 61
End: 2021-08-16

## 2021-08-18 LAB
ALBUMIN SERPL-MCNC: 4 G/DL (ref 3.5–5)
ALP SERPL-CCNC: 83 U/L (ref 45–120)
ALT SERPL W P-5'-P-CCNC: 17 U/L (ref 0–45)
ANION GAP SERPL CALCULATED.3IONS-SCNC: 9 MMOL/L (ref 5–18)
AST SERPL W P-5'-P-CCNC: 24 U/L (ref 0–40)
BASOPHILS # BLD AUTO: 0 10E3/UL (ref 0–0.2)
BASOPHILS NFR BLD AUTO: 0 %
BILIRUB SERPL-MCNC: 0.6 MG/DL (ref 0–1)
BUN SERPL-MCNC: 14 MG/DL (ref 8–22)
CALCIUM SERPL-MCNC: 9.7 MG/DL (ref 8.5–10.5)
CHLORIDE BLD-SCNC: 104 MMOL/L (ref 98–107)
CHOLEST SERPL-MCNC: 222 MG/DL
CO2 SERPL-SCNC: 30 MMOL/L (ref 22–31)
CREAT SERPL-MCNC: 0.75 MG/DL (ref 0.6–1.1)
EOSINOPHIL # BLD AUTO: 0.1 10E3/UL (ref 0–0.7)
EOSINOPHIL NFR BLD AUTO: 2 %
ERYTHROCYTE [DISTWIDTH] IN BLOOD BY AUTOMATED COUNT: 11.9 % (ref 10–15)
FASTING STATUS PATIENT QL REPORTED: ABNORMAL
GFR SERPL CREATININE-BSD FRML MDRD: 85 ML/MIN/1.73M2
GLUCOSE BLD-MCNC: 71 MG/DL (ref 70–125)
HCT VFR BLD AUTO: 39.8 % (ref 35–47)
HDLC SERPL-MCNC: 67 MG/DL
HGB BLD-MCNC: 13.4 G/DL (ref 11.7–15.7)
IMM GRANULOCYTES # BLD: 0 10E3/UL
IMM GRANULOCYTES NFR BLD: 0 %
LDLC SERPL CALC-MCNC: 126 MG/DL
LYMPHOCYTES # BLD AUTO: 1.6 10E3/UL (ref 0.8–5.3)
LYMPHOCYTES NFR BLD AUTO: 23 %
MCH RBC QN AUTO: 31.8 PG (ref 26.5–33)
MCHC RBC AUTO-ENTMCNC: 33.7 G/DL (ref 31.5–36.5)
MCV RBC AUTO: 95 FL (ref 78–100)
MONOCYTES # BLD AUTO: 0.5 10E3/UL (ref 0–1.3)
MONOCYTES NFR BLD AUTO: 7 %
NEUTROPHILS # BLD AUTO: 4.7 10E3/UL (ref 1.6–8.3)
NEUTROPHILS NFR BLD AUTO: 68 %
NRBC # BLD AUTO: 0 10E3/UL
NRBC BLD AUTO-RTO: 0 /100
PLATELET # BLD AUTO: 211 10E3/UL (ref 150–450)
POTASSIUM BLD-SCNC: 3.8 MMOL/L (ref 3.5–5)
PROT SERPL-MCNC: 6.6 G/DL (ref 6–8)
RBC # BLD AUTO: 4.21 10E6/UL (ref 3.8–5.2)
SODIUM SERPL-SCNC: 143 MMOL/L (ref 136–145)
TRIGL SERPL-MCNC: 145 MG/DL
WBC # BLD AUTO: 6.9 10E3/UL (ref 4–11)

## 2021-08-18 PROCEDURE — 80053 COMPREHEN METABOLIC PANEL: CPT | Mod: ORL | Performed by: FAMILY MEDICINE

## 2021-08-18 PROCEDURE — 85025 COMPLETE CBC W/AUTO DIFF WBC: CPT | Mod: ORL | Performed by: FAMILY MEDICINE

## 2021-08-18 PROCEDURE — 80061 LIPID PANEL: CPT | Mod: ORL | Performed by: FAMILY MEDICINE

## 2021-08-18 PROCEDURE — 36415 COLL VENOUS BLD VENIPUNCTURE: CPT | Mod: ORL | Performed by: FAMILY MEDICINE

## 2021-08-18 PROCEDURE — 82306 VITAMIN D 25 HYDROXY: CPT | Mod: ORL | Performed by: FAMILY MEDICINE

## 2021-08-19 LAB — DEPRECATED CALCIDIOL+CALCIFEROL SERPL-MC: 58 UG/L (ref 30–80)

## 2021-10-24 ENCOUNTER — HEALTH MAINTENANCE LETTER (OUTPATIENT)
Age: 61
End: 2021-10-24

## 2021-11-04 ENCOUNTER — PATIENT OUTREACH (OUTPATIENT)
Dept: CARE COORDINATION | Facility: CLINIC | Age: 61
End: 2021-11-04

## 2021-11-04 NOTE — PROGRESS NOTES
Social Work Follow-Up  [facility]    Data/Intervention:  Patient Name:  Cristel Turk  /Age:  1960 (61 year old)    Reason for Follow-Up:  Call from Pt's dtr about Medicare     Collaborated With:    Arron, Pt's dtr    Intervention/Education/Resources Provided:  Miriam needs to make decisions about Medicare as it will start for Cristel 2021. She anticipates she will be eligible for Medical Assistance in a little less than a year so wonders how that impacts her Medicare choices. Answered some of her questions and referred her to discuss further with the Sr Linkage Line.    Assessment/Plan:  Remain available.    Previously provided patient/family with writer's contact information and availability.       RUBINA Jones, [facility]    [facility]  [phone]

## 2022-01-29 ENCOUNTER — HOSPITAL ENCOUNTER (INPATIENT)
Facility: CLINIC | Age: 62
LOS: 8 days | Discharge: HOME | DRG: 885 | End: 2022-02-08
Attending: EMERGENCY MEDICINE | Admitting: INTERNAL MEDICINE
Payer: COMMERCIAL

## 2022-01-29 DIAGNOSIS — F22 PARANOIA (H): ICD-10-CM

## 2022-01-29 DIAGNOSIS — N39.0 URINARY TRACT INFECTION WITHOUT HEMATURIA, SITE UNSPECIFIED: ICD-10-CM

## 2022-01-29 LAB
ALBUMIN UR-MCNC: 50 MG/DL
ANION GAP SERPL CALCULATED.3IONS-SCNC: 11 MMOL/L (ref 3–14)
APPEARANCE UR: CLEAR
BASOPHILS # BLD AUTO: 0 10E3/UL (ref 0–0.2)
BASOPHILS NFR BLD AUTO: 0 %
BILIRUB UR QL STRIP: ABNORMAL
BUN SERPL-MCNC: 22 MG/DL (ref 7–30)
CALCIUM SERPL-MCNC: 9.4 MG/DL (ref 8.5–10.1)
CHLORIDE BLD-SCNC: 105 MMOL/L (ref 94–109)
CO2 SERPL-SCNC: 25 MMOL/L (ref 20–32)
COLOR UR AUTO: YELLOW
CREAT SERPL-MCNC: 0.75 MG/DL (ref 0.52–1.04)
EOSINOPHIL # BLD AUTO: 0 10E3/UL (ref 0–0.7)
EOSINOPHIL NFR BLD AUTO: 1 %
ERYTHROCYTE [DISTWIDTH] IN BLOOD BY AUTOMATED COUNT: 12.4 % (ref 10–15)
GFR SERPL CREATININE-BSD FRML MDRD: 88 ML/MIN/1.73M2
GLUCOSE BLD-MCNC: 89 MG/DL (ref 70–99)
GLUCOSE UR STRIP-MCNC: NEGATIVE MG/DL
HCT VFR BLD AUTO: 42.6 % (ref 35–47)
HGB BLD-MCNC: 14.5 G/DL (ref 11.7–15.7)
HGB UR QL STRIP: ABNORMAL
HYALINE CASTS: 1 /LPF
IMM GRANULOCYTES # BLD: 0 10E3/UL
IMM GRANULOCYTES NFR BLD: 0 %
KETONES UR STRIP-MCNC: >150 MG/DL
LEUKOCYTE ESTERASE UR QL STRIP: ABNORMAL
LYMPHOCYTES # BLD AUTO: 1.1 10E3/UL (ref 0.8–5.3)
LYMPHOCYTES NFR BLD AUTO: 20 %
MAGNESIUM SERPL-MCNC: 2.2 MG/DL (ref 1.6–2.3)
MCH RBC QN AUTO: 31.5 PG (ref 26.5–33)
MCHC RBC AUTO-ENTMCNC: 34 G/DL (ref 31.5–36.5)
MCV RBC AUTO: 93 FL (ref 78–100)
MONOCYTES # BLD AUTO: 0.4 10E3/UL (ref 0–1.3)
MONOCYTES NFR BLD AUTO: 8 %
MUCOUS THREADS #/AREA URNS LPF: PRESENT /LPF
NEUTROPHILS # BLD AUTO: 4 10E3/UL (ref 1.6–8.3)
NEUTROPHILS NFR BLD AUTO: 71 %
NITRATE UR QL: NEGATIVE
NRBC # BLD AUTO: 0 10E3/UL
NRBC BLD AUTO-RTO: 0 /100
PH UR STRIP: 6 [PH] (ref 5–7)
PHOSPHATE SERPL-MCNC: 2 MG/DL (ref 2.5–4.5)
PLATELET # BLD AUTO: 184 10E3/UL (ref 150–450)
POTASSIUM BLD-SCNC: 3 MMOL/L (ref 3.4–5.3)
POTASSIUM BLD-SCNC: 3.3 MMOL/L (ref 3.4–5.3)
RBC # BLD AUTO: 4.6 10E6/UL (ref 3.8–5.2)
RBC URINE: 3 /HPF
SARS-COV-2 RNA RESP QL NAA+PROBE: NEGATIVE
SODIUM SERPL-SCNC: 141 MMOL/L (ref 133–144)
SP GR UR STRIP: 1.03 (ref 1–1.03)
SQUAMOUS EPITHELIAL: 2 /HPF
TSH SERPL DL<=0.005 MIU/L-ACNC: 1.44 MU/L (ref 0.4–4)
UROBILINOGEN UR STRIP-MCNC: NORMAL MG/DL
WBC # BLD AUTO: 5.6 10E3/UL (ref 4–11)
WBC URINE: 15 /HPF

## 2022-01-29 PROCEDURE — 84100 ASSAY OF PHOSPHORUS: CPT | Performed by: INTERNAL MEDICINE

## 2022-01-29 PROCEDURE — 87086 URINE CULTURE/COLONY COUNT: CPT | Performed by: EMERGENCY MEDICINE

## 2022-01-29 PROCEDURE — 99220 PR INITIAL OBSERVATION CARE,LEVEL III: CPT | Performed by: INTERNAL MEDICINE

## 2022-01-29 PROCEDURE — 87635 SARS-COV-2 COVID-19 AMP PRB: CPT | Performed by: EMERGENCY MEDICINE

## 2022-01-29 PROCEDURE — 36415 COLL VENOUS BLD VENIPUNCTURE: CPT | Performed by: EMERGENCY MEDICINE

## 2022-01-29 PROCEDURE — 96375 TX/PRO/DX INJ NEW DRUG ADDON: CPT

## 2022-01-29 PROCEDURE — 250N000013 HC RX MED GY IP 250 OP 250 PS 637: Performed by: EMERGENCY MEDICINE

## 2022-01-29 PROCEDURE — C9803 HOPD COVID-19 SPEC COLLECT: HCPCS

## 2022-01-29 PROCEDURE — 80048 BASIC METABOLIC PNL TOTAL CA: CPT | Performed by: EMERGENCY MEDICINE

## 2022-01-29 PROCEDURE — 96361 HYDRATE IV INFUSION ADD-ON: CPT

## 2022-01-29 PROCEDURE — 85048 AUTOMATED LEUKOCYTE COUNT: CPT | Performed by: EMERGENCY MEDICINE

## 2022-01-29 PROCEDURE — 250N000013 HC RX MED GY IP 250 OP 250 PS 637: Performed by: INTERNAL MEDICINE

## 2022-01-29 PROCEDURE — 250N000011 HC RX IP 250 OP 636: Performed by: INTERNAL MEDICINE

## 2022-01-29 PROCEDURE — 84443 ASSAY THYROID STIM HORMONE: CPT | Performed by: EMERGENCY MEDICINE

## 2022-01-29 PROCEDURE — 84132 ASSAY OF SERUM POTASSIUM: CPT | Performed by: INTERNAL MEDICINE

## 2022-01-29 PROCEDURE — 258N000003 HC RX IP 258 OP 636: Performed by: EMERGENCY MEDICINE

## 2022-01-29 PROCEDURE — 36415 COLL VENOUS BLD VENIPUNCTURE: CPT | Performed by: INTERNAL MEDICINE

## 2022-01-29 PROCEDURE — G0378 HOSPITAL OBSERVATION PER HR: HCPCS

## 2022-01-29 PROCEDURE — 99285 EMERGENCY DEPT VISIT HI MDM: CPT | Mod: 25

## 2022-01-29 PROCEDURE — 96365 THER/PROPH/DIAG IV INF INIT: CPT

## 2022-01-29 PROCEDURE — 83735 ASSAY OF MAGNESIUM: CPT | Performed by: INTERNAL MEDICINE

## 2022-01-29 PROCEDURE — 250N000011 HC RX IP 250 OP 636: Performed by: EMERGENCY MEDICINE

## 2022-01-29 PROCEDURE — 99207 PR CDG-CODE CATEGORY CHANGED: CPT | Performed by: INTERNAL MEDICINE

## 2022-01-29 PROCEDURE — 81001 URINALYSIS AUTO W/SCOPE: CPT | Performed by: EMERGENCY MEDICINE

## 2022-01-29 RX ORDER — CITALOPRAM HYDROBROMIDE 10 MG/1
20 TABLET ORAL DAILY
COMMUNITY

## 2022-01-29 RX ORDER — LORAZEPAM 2 MG/ML
0.5 INJECTION INTRAMUSCULAR EVERY 4 HOURS PRN
Status: DISCONTINUED | OUTPATIENT
Start: 2022-01-29 | End: 2022-02-01

## 2022-01-29 RX ORDER — PROCHLORPERAZINE 25 MG
25 SUPPOSITORY, RECTAL RECTAL EVERY 12 HOURS PRN
Status: DISCONTINUED | OUTPATIENT
Start: 2022-01-29 | End: 2022-02-08 | Stop reason: HOSPADM

## 2022-01-29 RX ORDER — PROCHLORPERAZINE MALEATE 5 MG
10 TABLET ORAL EVERY 6 HOURS PRN
Status: DISCONTINUED | OUTPATIENT
Start: 2022-01-29 | End: 2022-02-08 | Stop reason: HOSPADM

## 2022-01-29 RX ORDER — ACETAMINOPHEN 650 MG/1
650 SUPPOSITORY RECTAL EVERY 6 HOURS PRN
Status: DISCONTINUED | OUTPATIENT
Start: 2022-01-29 | End: 2022-02-08 | Stop reason: HOSPADM

## 2022-01-29 RX ORDER — MULTIPLE VITAMINS W/ MINERALS TAB 9MG-400MCG
1 TAB ORAL DAILY
Status: DISCONTINUED | OUTPATIENT
Start: 2022-01-29 | End: 2022-02-08 | Stop reason: HOSPADM

## 2022-01-29 RX ORDER — DONEPEZIL HYDROCHLORIDE 10 MG/1
10 TABLET, FILM COATED ORAL AT BEDTIME
Status: DISCONTINUED | OUTPATIENT
Start: 2022-01-29 | End: 2022-02-03

## 2022-01-29 RX ORDER — LANOLIN ALCOHOL/MO/W.PET/CERES
3 CREAM (GRAM) TOPICAL AT BEDTIME
Status: DISCONTINUED | OUTPATIENT
Start: 2022-01-29 | End: 2022-02-08 | Stop reason: HOSPADM

## 2022-01-29 RX ORDER — ACETAMINOPHEN 325 MG/1
650 TABLET ORAL EVERY 6 HOURS PRN
Status: DISCONTINUED | OUTPATIENT
Start: 2022-01-29 | End: 2022-02-08 | Stop reason: HOSPADM

## 2022-01-29 RX ORDER — POTASSIUM CHLORIDE 1500 MG/1
40 TABLET, EXTENDED RELEASE ORAL ONCE
Status: DISCONTINUED | OUTPATIENT
Start: 2022-01-29 | End: 2022-01-29

## 2022-01-29 RX ORDER — ONDANSETRON 4 MG/1
4 TABLET, ORALLY DISINTEGRATING ORAL EVERY 6 HOURS PRN
Status: DISCONTINUED | OUTPATIENT
Start: 2022-01-29 | End: 2022-02-08 | Stop reason: HOSPADM

## 2022-01-29 RX ORDER — IBUPROFEN 400 MG/1
400 TABLET, FILM COATED ORAL EVERY 6 HOURS PRN
Status: DISCONTINUED | OUTPATIENT
Start: 2022-01-29 | End: 2022-02-08 | Stop reason: HOSPADM

## 2022-01-29 RX ORDER — HALOPERIDOL 5 MG/ML
2 INJECTION INTRAMUSCULAR EVERY 6 HOURS PRN
Status: DISCONTINUED | OUTPATIENT
Start: 2022-01-29 | End: 2022-02-01

## 2022-01-29 RX ORDER — MEMANTINE HYDROCHLORIDE 5 MG/1
5 TABLET ORAL
Status: DISCONTINUED | OUTPATIENT
Start: 2022-01-30 | End: 2022-02-08 | Stop reason: HOSPADM

## 2022-01-29 RX ORDER — CEPHALEXIN 500 MG/1
500 CAPSULE ORAL ONCE
Status: DISCONTINUED | OUTPATIENT
Start: 2022-01-29 | End: 2022-01-29

## 2022-01-29 RX ORDER — CEFTRIAXONE 1 G/1
1 INJECTION, POWDER, FOR SOLUTION INTRAMUSCULAR; INTRAVENOUS EVERY 24 HOURS
Status: DISCONTINUED | OUTPATIENT
Start: 2022-01-30 | End: 2022-02-01

## 2022-01-29 RX ORDER — POTASSIUM CHLORIDE 20MEQ/15ML
20 LIQUID (ML) ORAL ONCE
Status: COMPLETED | OUTPATIENT
Start: 2022-01-29 | End: 2022-01-29

## 2022-01-29 RX ORDER — SODIUM CHLORIDE 9 MG/ML
INJECTION, SOLUTION INTRAVENOUS CONTINUOUS
Status: DISCONTINUED | OUTPATIENT
Start: 2022-01-29 | End: 2022-02-02

## 2022-01-29 RX ORDER — AMOXICILLIN 250 MG
2 CAPSULE ORAL 2 TIMES DAILY PRN
Status: DISCONTINUED | OUTPATIENT
Start: 2022-01-29 | End: 2022-02-08 | Stop reason: HOSPADM

## 2022-01-29 RX ORDER — CEFTRIAXONE 1 G/1
1 INJECTION, POWDER, FOR SOLUTION INTRAMUSCULAR; INTRAVENOUS ONCE
Status: COMPLETED | OUTPATIENT
Start: 2022-01-29 | End: 2022-01-29

## 2022-01-29 RX ORDER — ONDANSETRON 2 MG/ML
4 INJECTION INTRAMUSCULAR; INTRAVENOUS EVERY 6 HOURS PRN
Status: DISCONTINUED | OUTPATIENT
Start: 2022-01-29 | End: 2022-02-08 | Stop reason: HOSPADM

## 2022-01-29 RX ORDER — CITALOPRAM HYDROBROMIDE 20 MG/1
20 TABLET ORAL DAILY
Status: DISCONTINUED | OUTPATIENT
Start: 2022-01-29 | End: 2022-02-08 | Stop reason: HOSPADM

## 2022-01-29 RX ORDER — POTASSIUM CHLORIDE 1.5 G/1.58G
40 POWDER, FOR SOLUTION ORAL ONCE
Status: COMPLETED | OUTPATIENT
Start: 2022-01-29 | End: 2022-01-29

## 2022-01-29 RX ORDER — QUETIAPINE FUMARATE 25 MG/1
25 TABLET, FILM COATED ORAL 2 TIMES DAILY
Status: DISCONTINUED | OUTPATIENT
Start: 2022-01-29 | End: 2022-02-01

## 2022-01-29 RX ORDER — AMOXICILLIN 250 MG
1 CAPSULE ORAL 2 TIMES DAILY PRN
Status: DISCONTINUED | OUTPATIENT
Start: 2022-01-29 | End: 2022-02-08 | Stop reason: HOSPADM

## 2022-01-29 RX ADMIN — Medication 3 MG: at 21:35

## 2022-01-29 RX ADMIN — POTASSIUM CHLORIDE 40 MEQ: 1.5 POWDER, FOR SOLUTION ORAL at 13:12

## 2022-01-29 RX ADMIN — DONEPEZIL HYDROCHLORIDE 10 MG: 10 TABLET ORAL at 21:35

## 2022-01-29 RX ADMIN — CEFTRIAXONE 1 G: 1 INJECTION, POWDER, FOR SOLUTION INTRAMUSCULAR; INTRAVENOUS at 12:52

## 2022-01-29 RX ADMIN — POTASSIUM CHLORIDE 20 MEQ: 1.5 SOLUTION ORAL at 20:09

## 2022-01-29 RX ADMIN — QUETIAPINE FUMARATE 25 MG: 25 TABLET ORAL at 18:00

## 2022-01-29 RX ADMIN — SODIUM CHLORIDE 1000 ML: 9 INJECTION, SOLUTION INTRAVENOUS at 12:54

## 2022-01-29 RX ADMIN — LORAZEPAM 0.5 MG: 2 INJECTION INTRAMUSCULAR; INTRAVENOUS at 19:28

## 2022-01-29 RX ADMIN — POTASSIUM & SODIUM PHOSPHATES POWDER PACK 280-160-250 MG 1 PACKET: 280-160-250 PACK at 20:07

## 2022-01-29 ASSESSMENT — ACTIVITIES OF DAILY LIVING (ADL)
DEPENDENT_IADLS:: CLEANING;COOKING;LAUNDRY;SHOPPING;MEAL PREPARATION;MEDICATION MANAGEMENT;MONEY MANAGEMENT;TRANSPORTATION

## 2022-01-29 ASSESSMENT — MIFFLIN-ST. JEOR: SCORE: 1015.82

## 2022-01-29 NOTE — ED NOTES
[facility]  ED Nurse Handoff Report    Cristel Turk is a 62 year old female   ED Chief complaint: Depression, Paranoid, and Altered Mental Status  . ED Diagnosis:   Final diagnoses:   Urinary tract infection without hematuria, site unspecified   Paranoia (H)     Allergies: No Known Allergies    Code Status: Full Code  Activity level - Baseline/Home:  Independent. Activity Level - Current:   Stand by Assist. Lift room needed: No. Bariatric: No   Needed: No   Isolation: No. Infection: Not Applicable.     Vital Signs:   Vitals:    01/29/22 1001 01/29/22 1045 01/29/22 1100 01/29/22 1145   BP: 128/68 104/83 91/51    Pulse: 69 78 68    Temp: 97.6  F (36.4  C)      TempSrc: Temporal      SpO2: 99% 99% 100%    Weight:    51.9 kg (114 lb 6.7 oz)       Cardiac Rhythm:  ,      Pain level:    Patient confused: Yes. Patient Falls Risk: Yes.   Elimination Status: Has voided   Patient Report - Initial Complaint:    Cristel Turk is a 62 year old female with a past medical history significant for dementia who presents to the ED accompanied by her son w/ a cc of increased paranoia, decreased appetite and p.o. intake.  The patient's son reports that the patient has been more irritable, paranoid with delusions that people are coming to kill her as well as decreased appetite and irritability.  The patient's son reports that she has not been overtly violent but did try to kick him on the way and today.  The patient denies chest pain, shortness of breath, abdominal pain, fevers, chills...   Tests Performed: refer to results review. Abnormal Results: refer to results review.   Treatments provided: refer to MAR  Family Comments: aware  OBS brochure/video discussed/provided to patient:  Yes  ED Medications:   Medications   potassium chloride (KLOR-CON) Packet 40 mEq (40 mEq Oral Given 1/29/22 1312)   0.9% sodium chloride BOLUS (1,000 mLs Intravenous New Bag 1/29/22 1254)   cefTRIAXone (ROCEPHIN) 1 g vial to attach  to  mL bag for ADULTS or NS 50 mL bag for PEDS (0 g Intravenous Stopped 1/29/22 1326)     Drips infusing:  No  For the majority of the shift, the patient's behavior Yellow. Interventions performed were n/a.    Sepsis treatment initiated: No     Patient tested for COVID 19 prior to admission: YES    ED Nurse Name/Phone Number: Osvaldo Gee RN,   2:13 PM    RECEIVING UNIT ED HANDOFF REVIEW    Above ED Nurse Handoff Report was reviewed: Yes  Reviewed by: Eileen Herman RN on January 29, 2022 at 3:28 PM

## 2022-01-29 NOTE — CONSULTS
Care Management Follow Up    Length of Stay (days): 0    Expected Discharge Date:       Concerns to be Addressed: discharge planning     Patient plan of care discussed at interdisciplinary rounds: No    Anticipated Discharge Disposition: Skilled Nursing Facilty     Anticipated Discharge Services: None  Anticipated Discharge DME: None    Patient/family educated on Medicare website which has current facility and service quality ratings: no  Education Provided on the Discharge Plan:    Patient/Family in Agreement with the Plan: unable to assess    Referrals Placed by CM/SW:    Private pay costs discussed: Not applicable    Additional Information:  See ED note for SW consult.       Rhoda Galdamez

## 2022-01-29 NOTE — ED PROVIDER NOTES
History     Chief Complaint:    Depression, Paranoid, and Altered Mental Status       HPI   History limited secondary to dementia.  Primarily provided by the patient's son.    Cristel Turk is a 62 year old female with a past medical history significant for dementia who presents to the ED accompanied by her son w/ a cc of increased paranoia, decreased appetite and p.o. intake.  The patient's son reports that the patient has been more irritable, paranoid with delusions that people are coming to kill her as well as decreased appetite and irritability.  The patient's son reports that she has not been overtly violent but did try to kick him on the way and today.  The patient denies chest pain, shortness of breath, abdominal pain, fevers, chills..    Allergies:  No Known Allergies    No Known Allergies    Medications:    citalopram (CELEXA) 10 MG tablet  donepezil (ARICEPT) 10 MG tablet  memantine (NAMENDA) 5 MG tablet  multivitamin w/minerals (MULTI-VITAMIN) tablet        Past Medical History:    Past Medical History:   Diagnosis Date     Memory loss        There are no problems to display for this patient.       Past Surgical History:    Past Surgical History:   Procedure Laterality Date     GI SURGERY      colonoscopy        Family History:    No family history on file.    Social History:   reports that she has never smoked. She has never used smokeless tobacco. She reports current alcohol use. She reports that she does not use drugs.    PCP: No Ref-Primary, Physician     Review of Systems  Full ROS completed and negative other than pertinent positives and negatives noted in HPI      Physical Exam     Patient Vitals for the past 24 hrs:   BP Temp Temp src Pulse SpO2 Weight   01/29/22 1145 -- -- -- -- -- 51.9 kg (114 lb 6.7 oz)   01/29/22 1100 91/51 -- -- 68 100 % --   01/29/22 1045 104/83 -- -- 78 99 % --   01/29/22 1001 128/68 97.6  F (36.4  C) Temporal 69 99 % --        Physical Exam    Constitutional: Well  developed, nontox appearance  Head: Atraumatic.   Neck:  no stridor  Eyes: no scleral icterus  Cardiovascular: RRR, 2+ bilat radial pulses  Pulmonary/Chest: nml resp effort  Abdominal: ND, soft, NT, no rebound or guarding   : no CVA tenderness bilat  Ext: Warm, well perfused, no edema  Neurological: Alert and disoriented, symmetric facies, moves ext x4  Skin: Skin is warm and dry.   Psychiatric: Mildly anxious, cooperative, calm, flat affect  Nursing note and vitals reviewed.      Emergency Department Course     Imaging:    No orders to display        Laboratory:    Results for orders placed or performed during the hospital encounter of 01/29/22   Basic metabolic panel     Status: Abnormal   Result Value Ref Range    Sodium 141 133 - 144 mmol/L    Potassium 3.0 (L) 3.4 - 5.3 mmol/L    Chloride 105 94 - 109 mmol/L    Carbon Dioxide (CO2) 25 20 - 32 mmol/L    Anion Gap 11 3 - 14 mmol/L    Urea Nitrogen 22 7 - 30 mg/dL    Creatinine 0.75 0.52 - 1.04 mg/dL    Calcium 9.4 8.5 - 10.1 mg/dL    Glucose 89 70 - 99 mg/dL    GFR Estimate 88 >60 mL/min/1.73m2   UA with Microscopic reflex to Culture     Status: Abnormal    Specimen: Urine, Clean Catch   Result Value Ref Range    Color Urine Yellow Colorless, Straw, Light Yellow, Yellow    Appearance Urine Clear Clear    Glucose Urine Negative Negative mg/dL    Bilirubin Urine Small (A) Negative    Ketones Urine >150 (A) Negative mg/dL    Specific Gravity Urine 1.032 1.003 - 1.035    Blood Urine Trace (A) Negative    pH Urine 6.0 5.0 - 7.0    Protein Albumin Urine 50  (A) Negative mg/dL    Urobilinogen Urine Normal Normal, 2.0 mg/dL    Nitrite Urine Negative Negative    Leukocyte Esterase Urine Moderate (A) Negative    Mucus Urine Present (A) None Seen /LPF    RBC Urine 3 (H) <=2 /HPF    WBC Urine 15 (H) <=5 /HPF    Squamous Epithelials Urine 2 (H) <=1 /HPF    Hyaline Casts Urine 1 <=2 /LPF    Narrative    Urine Culture ordered based on laboratory criteria   TSH with free T4  reflex     Status: Normal   Result Value Ref Range    TSH 1.44 0.40 - 4.00 mU/L   Asymptomatic COVID-19 Virus (Coronavirus) by PCR Nasopharyngeal     Status: Normal    Specimen: Nasopharyngeal; Swab   Result Value Ref Range    SARS CoV2 PCR Negative Negative    Narrative    Testing was performed using the damián  SARS-CoV-2 & Influenza A/B Assay on the damián  [employer]  System.  This test should be ordered for the detection of SARS-COV-2 in individuals who meet SARS-CoV-2 clinical and/or epidemiological criteria. Test performance is unknown in asymptomatic patients.  This test is for in vitro diagnostic use under the FDA EUA for laboratories certified under CLIA to perform moderate and/or high complexity testing. This test has not been FDA cleared or approved.  A negative test does not rule out the presence of PCR inhibitors in the specimen or target RNA in concentration below the limit of detection for the assay. The possibility of a false negative should be considered if the patient's recent exposure or clinical presentation suggests COVID-19.  [facility] are certified under the Clinical Laboratory Improvement Amendments of 1988 (CLIA-88) as qualified to perform moderate and/or high complexity laboratory testing.   CBC with platelets and differential     Status: None   Result Value Ref Range    WBC Count 5.6 4.0 - 11.0 10e3/uL    RBC Count 4.60 3.80 - 5.20 10e6/uL    Hemoglobin 14.5 11.7 - 15.7 g/dL    Hematocrit 42.6 35.0 - 47.0 %    MCV 93 78 - 100 fL    MCH 31.5 26.5 - 33.0 pg    MCHC 34.0 31.5 - 36.5 g/dL    RDW 12.4 10.0 - 15.0 %    Platelet Count 184 150 - 450 10e3/uL    % Neutrophils 71 %    % Lymphocytes 20 %    % Monocytes 8 %    % Eosinophils 1 %    % Basophils 0 %    % Immature Granulocytes 0 %    NRBCs per 100 WBC 0 <1 /100    Absolute Neutrophils 4.0 1.6 - 8.3 10e3/uL    Absolute Lymphocytes 1.1 0.8 - 5.3 10e3/uL    Absolute Monocytes 0.4 0.0 - 1.3 10e3/uL    Absolute Eosinophils 0.0 0.0  - 0.7 10e3/uL    Absolute Basophils 0.0 0.0 - 0.2 10e3/uL    Absolute Immature Granulocytes 0.0 <=0.4 10e3/uL    Absolute NRBCs 0.0 10e3/uL   CBC with platelets differential     Status: None    Narrative    The following orders were created for panel order CBC with platelets differential.  Procedure                               Abnormality         Status                     ---------                               -----------         ------                     CBC with platelets and d...[[id number]]                      Final result                 Please view results for these tests on the individual orders.         Procedures:  Procedures    Interventions:    Medications   potassium chloride (KLOR-CON) Packet 40 mEq (40 mEq Oral Given 22 1312)   0.9% sodium chloride BOLUS (1,000 mLs Intravenous New Bag 22 1254)   cefTRIAXone (ROCEPHIN) 1 g vial to attach to  mL bag for ADULTS or NS 50 mL bag for PEDS (0 g Intravenous Stopped 22 1326)        Emergency Department Course:  Past medical records, nursing notes, and vitals reviewed.  I performed an exam of the patient and obtained history, as documented above.    I rechecked the patient. Findings and plan explained to the Patient and daughter.    Impression & Plan          CMS Diagnoses:    Covid-19  Cristel Turk was evaluated during a global COVID-19 pandemic, which necessitated consideration that the patient might be at risk for infection with the SARS-CoV-2 virus that causes COVID-19.   Applicable protocols for evaluation were followed during the patient's care.   COVID-19 was considered as part of the patient's evaluation. The plan for testing is:  a test was obtained during this visit.      Medical Decision Makin year old female presenting w/ increased paranoia, decreased appetite    DDx includes worsening dementia, acute psychosis of the less likely, dementia with psychotic features, paranoia NOS, psychiatric illness NOS, UTI, electrolyte  abnormality. Labs significant for hypokalemia, a urinalysis concerning for infection, urine culture pending.  Imaging deferred given no history of trauma or respiratory symptoms; abdominal exam benign.  First dose of antibiotics as well as potassium supplementation ordered.  I discussed plan with the patient's daughter.  Given the patient's decreased p.o. intake and increased paranoia over the last 3 weeks, it may be reasonable to admit her to the hospitalist under observation status for hydration, treatment of UTI and medication management in regard to dementia and associated psychosis.  Patient's daughter counseled on all results, disposition and diagnosis.  They are understanding and agreeable to plan. Patient admitted in stable condition.      Diagnosis:    ICD-10-CM    1. Urinary tract infection without hematuria, site unspecified  N39.0    2. Paranoia (H)  F22         Discharge Medications:  New Prescriptions    No medications on file        1/29/2022   Jesus Hickman MD Vaughn, Christopher E, MD  01/29/22 0676

## 2022-01-29 NOTE — PHARMACY-ADMISSION MEDICATION HISTORY
Admission medication history interview status for this patient is complete. See [facility] admission navigator for allergy information, prior to admission medications and immunization status.     Medication history interview done, indicate source(s): Family - daughterMiriam  Medication history resources (including written lists, pill bottles, clinic record): SureScripts and Care Everywhere  Pharmacy: [facility] for discharge    Changes made to PTA medication list:  Added: -  Changed: -  Reported as Not Taking: -  Removed: aspirin, lexapro, sertraline, vitamin c    Actions taken by pharmacist (provider contacted, etc): Called patient's daughter to verify home med list     Additional medication history information: Patient was prescribed trazodone a few days ago, family was considering switching from citalopram to trazodone 25mg at bedtime but has decided not to do so at this time.    Medication reconciliation/reorder completed by provider prior to medication history?  N   (Y/N)       Prior to Admission medications    Medication Sig Last Dose Taking? Auth Provider   citalopram (CELEXA) 10 MG tablet Take 20 mg by mouth daily 1/27/2022 at AM Yes Unknown, Entered By History   donepezil (ARICEPT) 10 MG tablet Take 10 mg by mouth At Bedtime 1/27/2022 at PM Yes Reported, Patient   memantine (NAMENDA) 5 MG tablet Take 1 tablet (5 mg) by mouth daily before breakfast 1/27/2022 at AM Yes Lolly Frank MD   multivitamin w/minerals (MULTI-VITAMIN) tablet Take 1 tablet by mouth daily 1/27/2022 at AM Yes Reported, Patient

## 2022-01-29 NOTE — ED NOTES
Pt irritable when having vitals checked, not able to tolerate blood pressure cuff inflating, vitals deferred at this time. Lunch tray ordered had 30% of meal, 250 ml of fluid

## 2022-01-29 NOTE — ED NOTES
Care Management Initial Consult    General Information  Assessment completed with: Patient,Children, Patient and daughter Miriam  Type of CM/SW Visit: Initial Assessment    Primary Care Provider verified and updated as needed: Yes   Readmission within the last 30 days: no previous admission in last 30 days      Reason for Consult: discharge planning  Advance Care Planning: Advance Care Planning Reviewed: concerns discussed          Communication Assessment  Patient's communication style: spoken language (English or Bilingual)             Cognitive  Cognitive/Neuro/Behavioral: .WDL except,orientation        Orientation: disoriented to,place             Living Environment:   People in home: alone     Current living Arrangements: assisted living  Name of Facility: [facility]  [place]   Able to return to prior arrangements: yes       Family/Social Support:  Care provided by: other (see comments) (facility staff)  Provides care for: no one, unable/limited ability to care for self  Marital Status:   Children          Description of Support System: Supportive,Involved    Support Assessment: Adequate family and caregiver support,Patient communicates needs well met    Current Resources:   Patient receiving home care services:       Community Resources: Other (see comment) (CC through Clinic)  Equipment currently used at home: none  Supplies currently used at home: None    Employment/Financial:  Employment Status:          Financial Concerns: No concerns identified   Referral to Financial Counselor: No       Lifestyle & Psychosocial Needs:  Social Determinants of Health     Tobacco Use: Not on file   Alcohol Use: Not on file   Financial Resource Strain: Not on file   Food Insecurity: Not on file   Transportation Needs: Not on file   Physical Activity: Not on file   Stress: Not on file   Social Connections: Not on file   Intimate Partner Violence: Not on file   Depression: Not at risk     PHQ-2 Score: 0   Housing  Stability: Not on file       Functional Status:  Prior to admission patient needed assistance:   Dependent ADLs:: Ambulation-no assistive device  Dependent IADLs:: Cleaning,Cooking,Laundry,Shopping,Meal Preparation,Medication Management,Money Management,Transportation  Assesssment of Functional Status: Has complex medical needs, requires placement in a facility,Needs assistance with handling finances,Needs assistance with laundry/houskeeping,Needs assistance with meals,Needs assistance with dressing,Needs assistance with bathing,Needs assistance with medications,Needs assistance with shopping,Needs assistance with transportation    Mental Health Status:          Chemical Dependency Status:                Values/Beliefs:  Spiritual, Cultural Beliefs, [employer] Practices, Values that affect care:                 Additional Information:  Patient presents to the ED with her daughter. Patient is disoriented to location as she thought she was back at her memory care. Patient has dementia. Patient was unable to answer some of the SWs questions, so patient's daughter Miriam assisted.     Patient lives the [facility] in memory care. Direct Line: [phone] Main Line: [phone] Nurse Line: [phone]. Address was updated on facesheet under temporary address.     Patient gets assistance with meals, medication admistation, and cleaning. Her family helps transport her as well. Patient's son and daughter are the financial POA. Patients daughter reports she has a health care directive. There are no scanned docs, so SW encouraged daughter to bring it to their next appointment. Patient does not get any community resources or assistance through the [facility]. Patient has been working with an [facility].     The family denies needs at this time. They are requesting a psychiatric eval. CATHERINE will update MD about this request.     Patient sees Miguelangel Dent MD for neurology with the [facility].       Rhoda  RUBINA Galdamez, [facility]  , ED Care Management   [phone]    Rhoda Galdamez

## 2022-01-29 NOTE — H&P
[facility]    History and Physical - Hospitalist Service       Date of Admission:  1/29/2022    Assessment & Plan      Cristel Turk is a 62 year old female admitted on 1/29/2022. She has a past medical history significant for depression and dementia.  She was brought to emergency room by family due to paranoia and altered mental status.  This has been going on for the past few weeks.  Seems to be worsening over the past several days.  In the emergency room, found to have probable urinary tract infection.    1.  Urinary tract infection.  Patient currently very resistant to any medications or interventions.  -IV ceftriaxone 1 g daily if willing.  She did have first dose in the emergency room on 1/29.  -May have to change to oral antibiotics if not willing to have IV medications.  -Adjust antibiotics based on urine culture results when they are available.  -IV fluids if she is willing to have these running.  (Not currently willing)    2.  Acute paranoia on chronic dementia.  Acute paranoia possibly due to urinary tract infection.  However, it does sound like you are already underlying issues prior to an infection. -Treat UTI as noted above.  -Psychiatry consult.  -Bedside sitter.  -Seroquel 25 mg twice a day if she is willing to take this.  -IV haloperidol and lorazepam if needed.    -The weather outside in January in [place] is potentially lethal.  Patient is not currently able to think clearly or understand potential consequences of her actions.  If she were to try to leave the hospital, she would be placed on a hold for her safety.  -Try to avoid hold if possible.  -Restart memantine and donepezil.  -Start scheduled melatonin 3 mg at bedtime.  -Social work consult.    3.  Depression.    -Restart citalopram.    4.  Hypokalemia.  Suspect she may have other electrolyte abnormalities.  -Potassium replacement protocol.  -Check magnesium and phosphorus levels.             Diet: Regular Diet  Adult    DVT Prophylaxis: Ambulate every shift  Figueroa Catheter: Not present  Central Lines: None  Cardiac Monitoring: None  Code Status: Full Code      Clinically Significant Risk Factors Present on Admission                        Alfa Sarmiento DO  Hospitalist Service  [facility] [place] [facility]  Securely message with the Vocera Web Console (learn more here)  Text page via [employer] Paging/Directory         ______________________________________________________________________    Chief Complaint   Altered mental status.    History is obtained from the patient and patient's daughter    History of Present Illness   Cristel Turk is a 62 year old female who has a past medical history significant for depression and dementia.  Ms. Turk is not able to provide much medical history herself.  Daughter at bedside provides most information.  Ms. Turk has been having problems with paranoia for the past several weeks.  Family states that she has not been eating or drinking any significant amounts during this time.  She has not reportedly been making any threatening statements.  She has no complaints at this time.  Family is concerned that she is depressed.  She did have a urine sample sent a few weeks ago to check for urinary tract infection.  That reportedly came back negative.      Review of Systems    The 10 point Review of Systems is negative other than noted in the HPI     Past Medical History    I have reviewed this patient's medical history and updated it with pertinent information if needed.   Past Medical History:   Diagnosis Date     Memory loss        Past Surgical History   I have reviewed this patient's surgical history and updated it with pertinent information if needed.  Past Surgical History:   Procedure Laterality Date     GI SURGERY      colonoscopy       Social History   I have reviewed this patient's social history and updated it with pertinent information if needed.  Social History      Tobacco Use     Smoking status: Never Smoker     Smokeless tobacco: Never Used   Substance Use Topics     Alcohol use: Yes     Comment: Occasional glass of wine     Drug use: No       Family History     No known significant past medical problems in the immediate family.    Prior to Admission Medications   Prior to Admission Medications   Prescriptions Last Dose Informant Patient Reported? Taking?   citalopram (CELEXA) 10 MG tablet 1/27/2022 at AM  Yes Yes   Sig: Take 20 mg by mouth daily   donepezil (ARICEPT) 10 MG tablet 1/27/2022 at PM  Yes Yes   Sig: Take 10 mg by mouth At Bedtime   memantine (NAMENDA) 5 MG tablet 1/27/2022 at AM  No Yes   Sig: Take 1 tablet (5 mg) by mouth daily before breakfast   multivitamin w/minerals (MULTI-VITAMIN) tablet 1/27/2022 at AM  Yes Yes   Sig: Take 1 tablet by mouth daily      Facility-Administered Medications: None     Allergies   No Known Allergies    Physical Exam   Vital Signs: Temp: 98.5  F (36.9  C) Temp src: Oral BP: 99/76 Pulse: 71   Resp: 16 SpO2: 99 % O2 Device: None (Room air)    Weight: 116 lbs 8 oz    Gen: Anxious.  Awake.  Seems oriented to self and family.  She does not allow me to do a full physical exam.  Eyes:  PERRL, sclera anicteric.  Neck:  Supple.        Data   Data reviewed today: I reviewed all medications, new labs and imaging results over the last 24 hours.    Recent Labs   Lab 01/30/22  0530 01/29/22  1651 01/29/22  1036   WBC 6.3  --  5.6   HGB 12.9  --  14.5   MCV 92  --  93     --  184     --  141   POTASSIUM 3.5 3.3* 3.0*   CHLORIDE 114*  --  105   CO2 27  --  25   BUN 14  --  22   CR 0.70  --  0.75   ANIONGAP 3  --  11   MARCELA 8.4*  --  9.4   *  --  89   ALBUMIN 2.9*  --   --    PROTTOTAL 5.3*  --   --    BILITOTAL 0.4  --   --    ALKPHOS 57  --   --    ALT 23  --   --    AST 16  --   --

## 2022-01-29 NOTE — ED TRIAGE NOTES
Patient presents with confusion and concerns for mental health. Hx Alzheimer, coming from memory care with son. Family noted increasing confusion the past week or so. Family states she hasn't been eating, drinking or taking medications the past week. Daughter is concerned for her mental health due to extreme paranoia, psychosis and increasing depression.

## 2022-01-30 LAB
ALBUMIN SERPL-MCNC: 2.9 G/DL (ref 3.4–5)
ALP SERPL-CCNC: 57 U/L (ref 40–150)
ALT SERPL W P-5'-P-CCNC: 23 U/L (ref 0–50)
ANION GAP SERPL CALCULATED.3IONS-SCNC: 3 MMOL/L (ref 3–14)
AST SERPL W P-5'-P-CCNC: 16 U/L (ref 0–45)
BACTERIA UR CULT: NORMAL
BILIRUB SERPL-MCNC: 0.4 MG/DL (ref 0.2–1.3)
BUN SERPL-MCNC: 14 MG/DL (ref 7–30)
CALCIUM SERPL-MCNC: 8.4 MG/DL (ref 8.5–10.1)
CHLORIDE BLD-SCNC: 114 MMOL/L (ref 94–109)
CO2 SERPL-SCNC: 27 MMOL/L (ref 20–32)
CREAT SERPL-MCNC: 0.7 MG/DL (ref 0.52–1.04)
ERYTHROCYTE [DISTWIDTH] IN BLOOD BY AUTOMATED COUNT: 12.5 % (ref 10–15)
GFR SERPL CREATININE-BSD FRML MDRD: >90 ML/MIN/1.73M2
GLUCOSE BLD-MCNC: 123 MG/DL (ref 70–99)
HCT VFR BLD AUTO: 38.3 % (ref 35–47)
HGB BLD-MCNC: 12.9 G/DL (ref 11.7–15.7)
KETONES BLD-SCNC: 0.6 MMOL/L (ref 0–0.6)
MAGNESIUM SERPL-MCNC: 2.1 MG/DL (ref 1.6–2.3)
MCH RBC QN AUTO: 31.1 PG (ref 26.5–33)
MCHC RBC AUTO-ENTMCNC: 33.7 G/DL (ref 31.5–36.5)
MCV RBC AUTO: 92 FL (ref 78–100)
PLATELET # BLD AUTO: 162 10E3/UL (ref 150–450)
POTASSIUM BLD-SCNC: 3.5 MMOL/L (ref 3.4–5.3)
PROT SERPL-MCNC: 5.3 G/DL (ref 6.8–8.8)
RBC # BLD AUTO: 4.15 10E6/UL (ref 3.8–5.2)
SODIUM SERPL-SCNC: 144 MMOL/L (ref 133–144)
WBC # BLD AUTO: 6.3 10E3/UL (ref 4–11)

## 2022-01-30 PROCEDURE — 83735 ASSAY OF MAGNESIUM: CPT | Performed by: INTERNAL MEDICINE

## 2022-01-30 PROCEDURE — 85027 COMPLETE CBC AUTOMATED: CPT | Performed by: INTERNAL MEDICINE

## 2022-01-30 PROCEDURE — 36415 COLL VENOUS BLD VENIPUNCTURE: CPT | Performed by: INTERNAL MEDICINE

## 2022-01-30 PROCEDURE — G0378 HOSPITAL OBSERVATION PER HR: HCPCS

## 2022-01-30 PROCEDURE — 96376 TX/PRO/DX INJ SAME DRUG ADON: CPT

## 2022-01-30 PROCEDURE — 80053 COMPREHEN METABOLIC PANEL: CPT | Performed by: INTERNAL MEDICINE

## 2022-01-30 PROCEDURE — 99225 PR SUBSEQUENT OBSERVATION CARE,LEVEL II: CPT | Performed by: INTERNAL MEDICINE

## 2022-01-30 PROCEDURE — 250N000011 HC RX IP 250 OP 636: Performed by: INTERNAL MEDICINE

## 2022-01-30 PROCEDURE — 250N000013 HC RX MED GY IP 250 OP 250 PS 637: Performed by: INTERNAL MEDICINE

## 2022-01-30 PROCEDURE — 82010 KETONE BODYS QUAN: CPT | Performed by: INTERNAL MEDICINE

## 2022-01-30 RX ADMIN — QUETIAPINE FUMARATE 25 MG: 25 TABLET ORAL at 20:48

## 2022-01-30 RX ADMIN — LORAZEPAM 0.5 MG: 2 INJECTION INTRAMUSCULAR; INTRAVENOUS at 13:11

## 2022-01-30 RX ADMIN — Medication 3 MG: at 20:48

## 2022-01-30 RX ADMIN — CITALOPRAM HYDROBROMIDE 20 MG: 20 TABLET ORAL at 14:32

## 2022-01-30 RX ADMIN — CEFTRIAXONE 1 G: 1 INJECTION, POWDER, FOR SOLUTION INTRAMUSCULAR; INTRAVENOUS at 13:23

## 2022-01-30 RX ADMIN — QUETIAPINE FUMARATE 25 MG: 25 TABLET ORAL at 14:32

## 2022-01-30 RX ADMIN — POTASSIUM & SODIUM PHOSPHATES POWDER PACK 280-160-250 MG 1 PACKET: 280-160-250 PACK at 20:48

## 2022-01-30 RX ADMIN — MEMANTINE 5 MG: 5 TABLET ORAL at 14:32

## 2022-01-30 RX ADMIN — POTASSIUM & SODIUM PHOSPHATES POWDER PACK 280-160-250 MG 1 PACKET: 280-160-250 PACK at 13:12

## 2022-01-30 NOTE — CONSULTS
CM/SW consulted for discharge planning, assessment was already completed by SW in the ED. Per AM care rounds, awaiting psych eval at this time. CM/SW will continue to follow along and coordinate discharge planning once medical plan known.    Haydee Langston RN BSN   Inpatient Care Coordination  [facility]   Phone [phone]

## 2022-01-30 NOTE — PROGRESS NOTES
Addended by: EFRAIN HENSON on: 9/5/2021 06:43 PM     Modules accepted: Orders     Pt sleeping, did not given morning medications. Have not woke pt up per MD verbal order. Checked pt O2 (98%) and pulse (56).

## 2022-01-30 NOTE — UTILIZATION REVIEW
Concurrent stay review; Secondary Review Determination     [facility]          Under the authority of the Utilization Management Committee, the utilization review process indicated a secondary review on the above patient.  The review outcome is based on review of the medical records, discussions with staff, and applying clinical experience noted on the date of the review.          (x) Observation Status Appropriate - Concurrent stay review    RATIONALE FOR DETERMINATION   62 year old female admitted on 1/29/2022. She has a past medical history significant for depression and dementia.  She was brought to emergency room by family due to paranoia and altered mental status.  This has been going on for the past few weeks.  Seems to be worsening over the past several days.  In the emergency room, found to have probable urinary tract infection. Attending ordered observation.   No sepsis, no clear indication for prolonged inpatient hospitalization. The severity of illness, intensity of service provided, expected LOS and risk for adverse outcome make the care appropriate for observation.      This document was produced using voice recognition software       The information on this document is developed by the utilization review team in order for the business office to ensure compliance.  This only denotes the appropriateness of proper admission status and does not reflect the quality of care rendered.         The definitions of Inpatient Status and Observation Status used in making the determination above are those provided in the CMS Coverage Manual, Chapter 1 and Chapter 6, section 70.4.      Sincerely,     KAYLENE REN MD    System Medical Director  Utilization Management  [facility].

## 2022-01-30 NOTE — PROGRESS NOTES
Hospitalist Medicine Progress Note   [facility]       Cristel Turk is a 62 year old lady with past medical history of depression and dementia lives in an memory care unit came to [facility] on 1/29/2022 which symptoms of confusion and altered mental status going on for past week or so with decreased appetite and food intake including medications.  Daughter was concerned about her paranoia, psychosis.        Date of Admission:  1/29/2022  Assessment & Plan     Acute urinary tract infection  Awaiting urine culture results  Started on IV ceftriaxone 1/29/2022    Chronic dementia  Acute psychosis with paranoia  Probably related to delirium due to UTI  On Seroquel twice daily  Haldol and lorazepam as needed  Psychiatry came to see the patient but patient was sleeping  We'll try to avoid hold abortive in case patient is still confused and wanted to go home will be put on hold  Started on melatonin 3 mg at bedtime -at this time patient is sleeping soundly    Ketonuria  Check blood ketone levels and give dextrose containing IV fluids if needed    Depression  On citalopram    Hypokalemia  Replaced on potassium replacement protocol    Acute hypophosphatemia  Start phosphorus replacement protocol      Plan:   Check blood ketones if positive will start dextrose containing IV fluids and closely monitor phosphorus  Awaiting Psychiatry consult and recommendations (daughter is awaiting too)     Diet: Regular Diet Adult    DVT Prophylaxis: Pneumatic Compression Devices  Figueroa Catheter: Not present  Code Status: Full Code               The patient's care was discussed with the Patient's daughter .    Kp Frederick MD  Hospitalist Service  [facility]    ______________________________________________________________________    Interval History     Symptoms   Patient is sleepy    Review of Systems:   As above    Data reviewed today: I reviewed all medications, new labs and  imaging results over the last 24 hours.     Physical Exam   Vital Signs: Temp: 98.5  F (36.9  C) Temp src: Oral BP: 99/76 Pulse: 71   Resp: 16 SpO2: 99 % O2 Device: None (Room air)    Weight: 116 lbs 8 oz      GENERAL: Patient is not  in acute distress  NECK:  no Jugular Venous distention  HEART: S1 S2 regular Rate and Rhythm, there is no murmur,   LUNGS: Respirations are not laboured, Lungs are clear to auscultation without Crepitations or Wheezing   ABDOMEN: Soft , there is no tenderness ,Bowel Sounds are Positive   LOWER LIMBS: no   Pedal Edema   Bilaterally   CNS: Sleepy , Moving all the Four Limbs      Data   Recent Labs   Lab 01/30/22  0530 01/29/22  1651 01/29/22  1036   WBC 6.3  --  5.6   HGB 12.9  --  14.5   MCV 92  --  93     --  184     --  141   POTASSIUM 3.5 3.3* 3.0*   CHLORIDE 114*  --  105   CO2 27  --  25   BUN 14  --  22   CR 0.70  --  0.75   ANIONGAP 3  --  11   MARCELA 8.4*  --  9.4   *  --  89   ALBUMIN 2.9*  --   --    PROTTOTAL 5.3*  --   --    BILITOTAL 0.4  --   --    ALKPHOS 57  --   --    ALT 23  --   --    AST 16  --   --          No results found for this or any previous visit (from the past 24 hour(s)).

## 2022-01-30 NOTE — PLAN OF CARE
PRIMARY DIAGNOSIS: UTI   OUTPATIENT/OBSERVATION GOALS TO BE MET BEFORE DISCHARGE:  Vitals sign stable or return to baseline: LYNSEY, pt sleeping, do not wake pt per MD verbal order.     Tolerating oral antibiotics or has home infusion set up if applicable: No    Pain status: LYNSEY, pt sleeping.     Return to near baseline physical activity: Yes    Discharge Planner Nurse   Safe discharge environment identified: Yes  Barriers to discharge: Yes       Entered by: Alma Heaton 01/30/2022 3:08 PM     Please review provider order for any additional goals.     Nurse to notify provider when observation goals have been met and patient is ready for discharge.

## 2022-01-30 NOTE — CONSULTS
See dictation. #[id number]  Psychiatry   Initial Consultation  Patient seen, notes reviewed. Call prn until discharge.   Maria Dolores Zheng MD  1/30/2022

## 2022-01-30 NOTE — PLAN OF CARE
PRIMARY DIAGNOSIS: AMS, PARANOIA  OUTPATIENT/OBSERVATION GOALS TO BE MET BEFORE DISCHARGE:  1. ADLs back to baseline: Yes    2. Activity and level of assistance: Ambulating independently, bedside attendant    3. Pain status: Pain free.    4. Return to near baseline physical activity: Yes     Pt A&O to self, slept comfortably throughout shift, no complaints reports by pt. Bedside attendant in room for safety. SW following. Psych consult.    Discharge Planner Nurse   Safe discharge environment identified: Yes  Barriers to discharge: Yes       Entered by: Haydee Ibarra 01/30/2022      Please review provider order for any additional goals.   Nurse to notify provider when observation goals have been met and patient is ready for discharge.

## 2022-01-30 NOTE — PLAN OF CARE
PRIMARY DIAGNOSIS: PARANOIA     OUTPATIENT/OBSERVATION GOALS TO BE MET BEFORE DISCHARGE  1. Orthostatic performed: NA    2. Tolerating PO medications: Yes    3. Return to near baseline physical activity: Yes    4. Cleared for discharge by consultants (if involved): No    Discharge Planner Nurse   Safe discharge environment identified: from memory care  Barriers to discharge: safe discharge plan, psych consult       Entered by: Annie De La Rosa 01/29/2022 8:52 PM     Pt alert to self. Calm and cooperative. Still nervous about nursing staff in room but more talkative and pleasant after ativan was given on evening shift. Folding towels and cleaning room. Potassium and phosphorus low, replacing. Pt allowed RN to do quick assessment and pt ate dinner. IVF refused. Encouraging PO intake. Family and sitter at bedside for increased safety. On Rocephin for possible UTI. Plan for Psych and SW consult tomorrow. Will continue to provide supportive cares.     Please review provider order for any additional goals.   Nurse to notify provider when observation goals have been met and patient is ready for discharge.

## 2022-01-30 NOTE — PLAN OF CARE
"B/P: 99/76, T: 98.5, P: 71, R: 16    Patient arrived to floor ~16:00 with daughter. Patient very nervous, fearful. Pacing room. RN introduced self; patient did not make eye contact or engage in conversation. Later in shift, RN offered medication (celexa & Seroquel). Patient extremely hesitant, stating \"Those little ones are like a bomb. They're too strong. All doctors are the same - I just don't know\". After 45 minutes & with encouragement from son, patient agreeable to taking just Seroquel. Patient not aggressive or combative. Walking halls with daughter. Drank one glass of water, declined further offer of food/drink. Unable to complete assessment d/t paranoia.  "

## 2022-01-30 NOTE — PLAN OF CARE
PRIMARY DIAGNOSIS: UTI  OUTPATIENT/OBSERVATION GOALS TO BE MET BEFORE DISCHARGE:  Vitals sign stable or return to baseline:  Pt sleeping, unable to get vitals.     Tolerating oral antibiotics or has home infusion set up if applicable: No    Pain status: LYNSEY, pt sleeping.     Return to near baseline physical activity: Yes    Discharge Planner Nurse   Safe discharge environment identified: Yes  Barriers to discharge: Yes       Entered by: Alma Heaton 01/30/2022 10:05 AM     Please review provider order for any additional goals.     Nurse to notify provider when observation goals have been met and patient is ready for discharge.

## 2022-01-30 NOTE — PLAN OF CARE
PRIMARY DIAGNOSIS: AMS, PARANOIA  OUTPATIENT/OBSERVATION GOALS TO BE MET BEFORE DISCHARGE:  ADLs back to baseline: Yes    Activity and level of assistance: Ambulating independently, bedside attendant    Pain status: Pain free.    Return to near baseline physical activity: Yes     Discharge Planner Nurse   Safe discharge environment identified: Yes  Barriers to discharge: Yes       Entered by: Haydee Ibarra 01/30/2022      Please review provider order for any additional goals.   Nurse to notify provider when observation goals have been met and patient is ready for discharge.

## 2022-01-30 NOTE — CONSULTS
Consult Date: 01/30/2022    IDENTIFICATION:  The patient is a 62-year-old   female with known early onset Alzheimer's disease, who is seen for medication evaluation at the request of Dr. Sarmiento for this patient admitted from her facility with increased paranoia and confusion for the past several weeks.    HISTORY OF PRESENT ILLNESS:  The patient was admitted to the general medical unit from [facility] for treatment of UTI and is currently on Rocephin.  She has been offered p.o. medications, including her usual Namenda and Aricept, but has been somnolent, avoidant.  She had no recent falls or febrile illness.  Her laboratory studies, including a comprehensive medical panel, within normal limits.  CBC also normal.  TSH is 1.44.  She did test negative for SARS.  Her daughter is known contact as the patient is unable to give consent for treatment.    PAST PSYCHIATRIC HISTORY:  Diagnosis of early onset Alzheimer's disease indicated in notes with changes in memory noticeable by family in late 2015.  She has no prior history of depression, alcoholism or other chemical dependency.  No suicide attempts or psychiatric hospitalizations.  Medications as above.  She is followed neurologically by Dr. Dent.    PAST MEDICAL HISTORY:  Significant for hypertension, DJD, chronic hip pain, remote basal cell carcinoma.  No history of significant closed head trauma or seizure.    SOCIAL HISTORY:  The patient is .  Daughter, Miriam, is primary contact.  She resides in assisted living at the [facility].  She normally ambulates independently.  No history of abuse.    VITAL SIGNS:  Temperature 98.5, pulse 56, respiratory rate 16, BP 99/76, saturation 98% on room air.  Weight 52.8 kg.    FAMILY HISTORY:  No known mental illness, chemical dependency or suicide.    MENTAL STATUS EXAMINATION:  The patient is a brunette, 62-year-old actually looking at or below stated age.  She appeared to be  sleeping on her side.  Resisted attempts to open her eyes or respond when shook her head negatively several times.  No agitation or lability.  She has had no neglect or known recent fall.  Baseline is advanced memory loss.  Recent paranoia, agitation.    PSYCHIATRIC DIAGNOSES:    1.  Neurocognitive disorder secondary to early onset dementia, Alzheimer's type.  2.  Delirium secondary to a urinary tract infection with increased paranoia.    RECOMMENDATION:  Agree with starting Seroquel; however, the patient has been unwilling to take p.o. at this time.  Continue efforts at compliance, which also includes Aricept and Namenda.  She hopefully will improve with the treatment of her UTI at baseline behavior.   following.  Unclear if she will need step-up of care.  She is not a candidate with delirium and lack of rehab potential for admission to a Geriatric Psychiatry at this time.     Please call with questions and concerns and thank you for consultation.    Maria Dolores Zheng MD        D: 2022   T: 2022   MT: BEN    Name:     PADMINI CANNONCarl  MRN:      [id number]        Account:      [id number]   :      1960           Consult Date: 2022     Document: [id number]

## 2022-01-30 NOTE — PLAN OF CARE
ROOM # 221    Living Situation (if not independent, order SW consult): Memory Care  Facility name: [facility]  : Miriam (daughter)    Activity level at baseline: Ind  Activity level on admit: Ind (SBA d/t wandering)      Patient registered to observation; given Patient Bill of Rights; given the opportunity to ask questions about observation status and their plan of care.  Patient has been oriented to the observation room, bathroom and call light is in place.    Discussed discharge goals and expectations with patient/family.

## 2022-01-30 NOTE — PLAN OF CARE
Pt slept most of shift, woke up around 1300. Pt was fearful when she woke up, guarded and withdrawn. Pt received x1 dose ativan. Pt allowed nursing staff to get a blood draw and received her IV antibiotics. Pt started pacing room and mood improved after ativan and started talking more. Took a lot of encouragement and reassurance by nursing staff and daughter to take morning pills. Pt drinking sips of water, but has poor appetite (25% of lunch eaten). Phos replacement in process.

## 2022-01-31 LAB
MAGNESIUM SERPL-MCNC: 2.1 MG/DL (ref 1.6–2.3)
PHOSPHATE SERPL-MCNC: 3.9 MG/DL (ref 2.5–4.5)
POTASSIUM BLD-SCNC: 3.4 MMOL/L (ref 3.4–5.3)

## 2022-01-31 PROCEDURE — 36415 COLL VENOUS BLD VENIPUNCTURE: CPT | Performed by: INTERNAL MEDICINE

## 2022-01-31 PROCEDURE — 120N000004 HC R&B MS OVERFLOW

## 2022-01-31 PROCEDURE — 83735 ASSAY OF MAGNESIUM: CPT | Performed by: INTERNAL MEDICINE

## 2022-01-31 PROCEDURE — 250N000011 HC RX IP 250 OP 636: Performed by: INTERNAL MEDICINE

## 2022-01-31 PROCEDURE — 99233 SBSQ HOSP IP/OBS HIGH 50: CPT | Performed by: INTERNAL MEDICINE

## 2022-01-31 PROCEDURE — G0378 HOSPITAL OBSERVATION PER HR: HCPCS

## 2022-01-31 PROCEDURE — 84100 ASSAY OF PHOSPHORUS: CPT | Performed by: INTERNAL MEDICINE

## 2022-01-31 PROCEDURE — 96375 TX/PRO/DX INJ NEW DRUG ADDON: CPT

## 2022-01-31 PROCEDURE — 84132 ASSAY OF SERUM POTASSIUM: CPT | Performed by: INTERNAL MEDICINE

## 2022-01-31 PROCEDURE — 96376 TX/PRO/DX INJ SAME DRUG ADON: CPT

## 2022-01-31 PROCEDURE — 250N000013 HC RX MED GY IP 250 OP 250 PS 637: Performed by: INTERNAL MEDICINE

## 2022-01-31 RX ORDER — OLANZAPINE 10 MG/2ML
5 INJECTION, POWDER, FOR SOLUTION INTRAMUSCULAR ONCE
Status: COMPLETED | OUTPATIENT
Start: 2022-01-31 | End: 2022-01-31

## 2022-01-31 RX ORDER — OLANZAPINE 5 MG/1
5 TABLET, ORALLY DISINTEGRATING ORAL
Status: DISCONTINUED | OUTPATIENT
Start: 2022-02-01 | End: 2022-02-01

## 2022-01-31 RX ORDER — POTASSIUM CHLORIDE 1.5 G/1.58G
20 POWDER, FOR SOLUTION ORAL ONCE
Status: COMPLETED | OUTPATIENT
Start: 2022-01-31 | End: 2022-01-31

## 2022-01-31 RX ADMIN — CEFTRIAXONE 1 G: 1 INJECTION, POWDER, FOR SOLUTION INTRAMUSCULAR; INTRAVENOUS at 13:11

## 2022-01-31 RX ADMIN — OLANZAPINE 5 MG: 10 INJECTION, POWDER, LYOPHILIZED, FOR SOLUTION INTRAMUSCULAR at 17:09

## 2022-01-31 RX ADMIN — POTASSIUM CHLORIDE 20 MEQ: 1.5 POWDER, FOR SOLUTION ORAL at 20:27

## 2022-01-31 RX ADMIN — HALOPERIDOL LACTATE 2 MG: 5 INJECTION, SOLUTION INTRAMUSCULAR at 14:19

## 2022-01-31 ASSESSMENT — ACTIVITIES OF DAILY LIVING (ADL)
ADLS_ACUITY_SCORE: 11
ADLS_ACUITY_SCORE: 11
ADLS_ACUITY_SCORE: 12

## 2022-01-31 NOTE — UTILIZATION REVIEW
"Admission Status; Secondary Review Determination     Admission Date: 1/29/2022 10:12 AM       Under the authority of the Utilization Management Committee, the utilization review process indicated a secondary review on the above patient.  The review outcome is based on review of the medical records, discussions with staff, and applying clinical experience noted on the date of the review.        (x)      Inpatient Status Appropriate - This patient's medical care is consistent with medical management for inpatient care and reasonable inpatient medical practice.      () Observation Status Appropriate - This patient does not meet hospital inpatient criteria and is placed in observation status. If this patient's primary payer is Medicare and was admitted as an inpatient, Condition Code 44 should be used and patient status changed to \"observation\".   () Admission Status NOT Appropriate - This patient's medical care is not consistent with medical management for Inpatient or Observation Status.        () Outpatient Procedure Status Appropriate - Procedure not on Medicare Inpatient list and no complications at the time of this review       RATIONALE FOR DETERMINATION      Brief clinical presentation, information copied from the chart, abbreviated and edited for relevant content:       Waiting to discuss with attending, Dr. Frederick. Patient still with significant delirium, failing OBS care, will advance to IP.        Cristel Turk is a 62 year old  Woman with  past medical history of depression and dementia lives in an memory care unit came to [facility] on 1/29/2022 which symptoms of confusion and altered mental status. Change in MS for  past week or so with decreased appetite and food intake including medications.  Daughter was concerned about her paranoia, psychosis. Patient was evaluated by psychiatry Dr. Tita JONES. it was decided between the psychiatry and the daughter that the patient would have intramuscular " Zyprexa olanzapine 5 mg today. It was also decided that palliative care will see the patient. Also with acute urinary tract infection - Started on IV ceftriaxone 1/29/2022 - will give for a total of 7 days of antibiotics. Finally, patient is still confused and if wanted to go home will be put on hold.         In summary, the severity of illness, intensity of service provided, expected length of stay and risk for adverse outcome make the care complex, high risk and appropriate for hospital admission.        The information on this document is developed by the utilization review team in order for the business office to ensure compliance.  This only denotes the appropriateness of proper admission status and does not reflect the quality of care rendered.         The definitions of Inpatient Status and Observation Status used in making the determination above are those provided in the CMS Coverage Manual, Chapter 1 and Chapter 6, section 70.4.      Sincerely,      Lupe Banda MD   Utilization Review/ Case Management  [facility].

## 2022-01-31 NOTE — PROGRESS NOTES
Hospitalist Medicine Progress Note   [facility]       Cristel Turk is a 62 year old lady with past medical history of depression and dementia lives in an memory care unit came to [facility] on 1/29/2022 which symptoms of confusion and altered mental status going on for past week or so with decreased appetite and food intake including medications.  Daughter was concerned about her paranoia, psychosis. Patient was evaluated by psychiatry Dr. Tita JONES. it was decided between the psychiatry and the daughter that the patient would have intramuscular Zyprexa olanzapine 5 mg today and if she is improving or not based on which they would want for the decision to be done. It was also decided that palliative care will see the patient.        Date of Admission:  1/29/2022  Assessment & Plan     Acute urinary tract infection  Urogenital arlene found in urine culture   Started on IV ceftriaxone 1/29/2022 - will give for a total of 7 days of antibiotics with no fever though with acute delirium    Chronic dementia  Acute psychosis with paranoia  Due to delirium from UTI  On Seroquel twice daily  Haldol and lorazepam as needed  Psychiatry evaluated the patient and I did talk to them after psychiatry talk to the patient's family  At this time patient will be given 5 mg of intramuscular olanzapine for evaluation of improvement of psychosis and later put on 5 mg twice daily of dissolvable olanzapine starting tomorrow. They also wanted patient to be evaluated by palliative care  We'll try to avoid hold in case patient is still confused and wanted to go home will be put on hold   Started on melatonin 3 mg at bedtime -at this time patient is sleeping soundly    Patient did not sleep well in the last 2 days and when she got up she was agitated and paranoid.  Patient is still refusing medications and would rather not eat or drink.   I did discuss the situation with the psychiatrist and she is of the  opinion that palliative care evaluation might be appropriate.  Psychiatrist would talk with the daughter Miriam and conveyed to me the gist of the discussion and the decisions made.     Ketonuria  Blood ketone levels normal  Better now.     Depression  On citalopram    Hypokalemia  Replaced on potassium replacement protocol    Acute hypophosphatemia  Start phosphorus replacement protocol and is now replaced and normal      Plan:   Olanzapine 5 mg intramuscular x1 today -patient's daughter is coming to visit the mother and would like to help the nursing staff and giving the intramuscular olanzapine  Palliative care consult  Patient is refusing all the labs    Diet: Regular Diet Adult    DVT Prophylaxis: Pneumatic Compression Devices  Figueroa Catheter: Not present  Code Status: Full Code               The patient's care was discussed with the Patient's daughter .    Kp Frederick MD  Hospitalist Service  [facility]    ______________________________________________________________________    Interval History     Symptoms   Patient is alert oriented though does not indulge in any discussion    Review of Systems:   As above    Data reviewed today: I reviewed all medications, new labs and imaging results over the last 24 hours.     Physical Exam   Vital Signs: Temp: 98.6  F (37  C) Temp src: Oral BP: 130/59 Pulse: 58   Resp: 16 SpO2: 98 % O2 Device: None (Room air)    Weight: 116 lbs 8 oz      GENERAL: Patient is not  in acute distress  NECK:  no Jugular Venous distention  HEART: S1 S2 bradycardia is present  LUNGS: Respirations are not laboured, Lungs are clear to auscultation without Crepitations or Wheezing   ABDOMEN: Soft , there is no tenderness ,Bowel Sounds are Positive   LOWER LIMBS: no   Pedal Edema   Bilaterally   CNS: Alert but noncommunicative except at times opening eyes and nodding no, moving all the Four Limbs      Data   Recent Labs   Lab 01/31/22  0609 01/30/22  0530 01/29/22  4656  01/29/22  1036   WBC  --  6.3  --  5.6   HGB  --  12.9  --  14.5   MCV  --  92  --  93   PLT  --  162  --  184   NA  --  144  --  141   POTASSIUM 3.4 3.5 3.3* 3.0*   CHLORIDE  --  114*  --  105   CO2  --  27  --  25   BUN  --  14  --  22   CR  --  0.70  --  0.75   ANIONGAP  --  3  --  11   MARCELA  --  8.4*  --  9.4   GLC  --  123*  --  89   ALBUMIN  --  2.9*  --   --    PROTTOTAL  --  5.3*  --   --    BILITOTAL  --  0.4  --   --    ALKPHOS  --  57  --   --    ALT  --  23  --   --    AST  --  16  --   --          No results found for this or any previous visit (from the past 24 hour(s)).     WDL

## 2022-01-31 NOTE — PLAN OF CARE
PRIMARY DIAGNOSIS: UTI   OUTPATIENT/OBSERVATION GOALS TO BE MET BEFORE DISCHARGE:  Vitals sign stable or return to baseline: Pt refusing VS     Tolerating oral antibiotics or has home infusion set up if applicable: No     Pain status: LYNSEY, pt sleeping.      Return to near baseline physical activity: Yes     Discharge Planner Nurse   Safe discharge environment identified: Yes  Barriers to discharge: Yes         Please review provider order for any additional goals.      Nurse to notify provider when observation goals have been met and patient is ready for discharge.    Pt ambulating the halls with . Mood is pleasant. Refusing VS.

## 2022-01-31 NOTE — PLAN OF CARE
PRIMARY DIAGNOSIS: AMS, PARANOIA  OUTPATIENT/OBSERVATION GOALS TO BE MET BEFORE DISCHARGE:  1. ADLs back to baseline: Yes    2. Activity and level of assistance: Ambulating independently, bedside attendant    3. Pain status: Pain free.    4. Return to near baseline physical activity: Yes     Discharge Planner Nurse   Safe discharge environment identified: Yes  Barriers to discharge: Yes       Entered by: Haydee Ibarra 01/31/2022      Please review provider order for any additional goals.   Nurse to notify provider when observation goals have been met and patient is ready for discharge.

## 2022-01-31 NOTE — PLAN OF CARE
PRIMARY DIAGNOSIS: AMS, PARANOIA  OUTPATIENT/OBSERVATION GOALS TO BE MET BEFORE DISCHARGE:  1. ADLs back to baseline: Yes    2. Activity and level of assistance: Ambulating independently, bedside attendant    3. Pain status: Pain free.    4. Return to near baseline physical activity: Yes     Discharge Planner Nurse   Safe discharge environment identified: Yes  Barriers to discharge: Yes       Entered by: Haydee Ibarra 01/30/2022     Please review provider order for any additional goals.   Nurse to notify provider when observation goals have been met and patient is ready for discharge.

## 2022-01-31 NOTE — PLAN OF CARE
PRIMARY DIAGNOSIS: AMS, PARANOIA  OUTPATIENT/OBSERVATION GOALS TO BE MET BEFORE DISCHARGE:  1. ADLs back to baseline: Yes    2. Activity and level of assistance: Ambulating independently, bedside attendant    3. Pain status: Pain free.    4. Return to near baseline physical activity: Yes     A&O to self. Up independent, bedside attendant at bedside. Received scheduled seroquel and PRN melatonin at HS. Pt slept through night, no pain or complaints reported. Electrolytes monitored and replaced per protocol. Rocephin for UTI. O/w PIV SL. Psych and CC/SW following.     Discharge Planner Nurse   Safe discharge environment identified: Yes  Barriers to discharge: Yes       Entered by: Haydee Ibarra 01/31/2022      Please review provider order for any additional goals.   Nurse to notify provider when observation goals have been met and patient is ready for discharge.

## 2022-02-01 LAB
PHOSPHATE SERPL-MCNC: 3 MG/DL (ref 2.5–4.5)
POTASSIUM BLD-SCNC: 3.8 MMOL/L (ref 3.4–5.3)

## 2022-02-01 PROCEDURE — 250N000011 HC RX IP 250 OP 636: Performed by: NURSE PRACTITIONER

## 2022-02-01 PROCEDURE — 120N000004 HC R&B MS OVERFLOW

## 2022-02-01 PROCEDURE — 250N000013 HC RX MED GY IP 250 OP 250 PS 637: Performed by: NURSE PRACTITIONER

## 2022-02-01 PROCEDURE — 250N000013 HC RX MED GY IP 250 OP 250 PS 637: Performed by: INTERNAL MEDICINE

## 2022-02-01 PROCEDURE — 84132 ASSAY OF SERUM POTASSIUM: CPT | Performed by: INTERNAL MEDICINE

## 2022-02-01 PROCEDURE — 36415 COLL VENOUS BLD VENIPUNCTURE: CPT | Performed by: INTERNAL MEDICINE

## 2022-02-01 PROCEDURE — 84100 ASSAY OF PHOSPHORUS: CPT | Performed by: INTERNAL MEDICINE

## 2022-02-01 PROCEDURE — 99232 SBSQ HOSP IP/OBS MODERATE 35: CPT | Performed by: PSYCHIATRY & NEUROLOGY

## 2022-02-01 PROCEDURE — 99233 SBSQ HOSP IP/OBS HIGH 50: CPT | Performed by: NURSE PRACTITIONER

## 2022-02-01 PROCEDURE — 93005 ELECTROCARDIOGRAM TRACING: CPT

## 2022-02-01 PROCEDURE — 250N000013 HC RX MED GY IP 250 OP 250 PS 637: Performed by: PHYSICIAN ASSISTANT

## 2022-02-01 PROCEDURE — 99222 1ST HOSP IP/OBS MODERATE 55: CPT | Performed by: NURSE PRACTITIONER

## 2022-02-01 RX ORDER — LORAZEPAM 0.5 MG/1
0.5 TABLET ORAL 2 TIMES DAILY
Status: DISCONTINUED | OUTPATIENT
Start: 2022-02-01 | End: 2022-02-02

## 2022-02-01 RX ORDER — OLANZAPINE 5 MG/1
5 TABLET, ORALLY DISINTEGRATING ORAL 3 TIMES DAILY
Status: DISCONTINUED | OUTPATIENT
Start: 2022-02-01 | End: 2022-02-03

## 2022-02-01 RX ORDER — LORAZEPAM 2 MG/ML
0.5 INJECTION INTRAMUSCULAR 2 TIMES DAILY
Status: DISCONTINUED | OUTPATIENT
Start: 2022-02-01 | End: 2022-02-02

## 2022-02-01 RX ORDER — HALOPERIDOL 1 MG/1
2 TABLET ORAL 2 TIMES DAILY
Status: DISCONTINUED | OUTPATIENT
Start: 2022-02-01 | End: 2022-02-02

## 2022-02-01 RX ORDER — HALOPERIDOL 5 MG/ML
2 INJECTION INTRAMUSCULAR 2 TIMES DAILY
Status: DISCONTINUED | OUTPATIENT
Start: 2022-02-01 | End: 2022-02-02

## 2022-02-01 RX ADMIN — OLANZAPINE 5 MG: 5 TABLET, ORALLY DISINTEGRATING ORAL at 14:53

## 2022-02-01 ASSESSMENT — ACTIVITIES OF DAILY LIVING (ADL)
ADLS_ACUITY_SCORE: 12

## 2022-02-01 NOTE — PLAN OF CARE
Patient is disoriented to place, time, and situation, sitter at bedside. Patient has been refusing all oral medications throughout the night and also has been refusing vital signs, up independently in room, PIV-SL, no PRN psych meds needed through out the night, patient has been resting and sleeping through out the night, SW following for placement, palliative care consult in, +UA- IV Rocephin given, will continue to monitor    Vital signs:  Temp: 97  F (36.1  C) Temp src: Oral BP: 103/55 Pulse: 61   Resp: 18 SpO2: 97 % O2 Device: None (Room air)

## 2022-02-01 NOTE — PROGRESS NOTES
VSS. A&Ox1. Oriented to self. Up ind. Sitter at bedside. Pt refused oral mediations. IM zyprexa given, IV haldol and IV abx given. Pt slept until noon today.  Electrolytes monitored and replaced per protocol. PIV SL. Psych and SW following.

## 2022-02-01 NOTE — CONSULTS
"    [facility]  Palliative Care Consultation   Text Page    Assessment & Plan   Cristel Turk is a 62 year old female who was admitted on 1/29/2022.   Consulted by Ameya MEZA to assist with symptom management, goals of care, and development of plan of care.    Recommendations:  1. Goals of Care- Full Code Restorative care   Hospitalization goals discussed discussed overall goals with daughter who reports that she would like to figure out what the plans are with her mom before talking about further goals.   Decisional Capacity- Unreliable, Alert and oriented to self only  . Patient does not have an advance directive. Per  informed consent policy next of kin should be involved if patient becomes unable.  POLST will need to complete discharge    2. Delirium, psychosis:  -Haloperidol 2 mg IV or IM twice daily    3. Malnutrition  -Pending goals  -Appreciate the input of dietician for on going recommendations    4. Spiritual Care  Oriented to Spiritual Health as part of Palliative Care team. Spiritual Health Services declined at this time.  Spiritual Background: Baptism     5. Care Planning  Appreciate Care of Maryconnie Eid, for discharge planning as able.      Medical Decision Making and Goals of Care:  Discussed on February 1, 2022 with Kristi YEE, CNP: Met with patient in her room, she is unable to engage in conversation. She answers \"I don't know\" to all questions including are you in pain. Phone call placed to patients' daughter Miriam. Discussed and reviewed my roll in the team. Discussed her understanding of the diagnosis and what to expect. Discussed overall goals Miriam reports that they have a  Directive however her mom was unable to list as many \"care specific\" goals in her directive. She stated that she understands that she may need to change the plan in the future but she needs to figure out what psych thinks is appropriate before talking about further goals of care. " Asked about a directive and she stated she will e-mail to me. She was yoni my number should questions arise. Discussed that we will follow up in the future while her mom is still here. Questions asked and answered    Thank you for involving us in the patient's care.     Kristi YEE CNP  Pain Management and Palliative Care  [facility]  Pgr: [phone]  Karishma Larry NP student [facility]     Time Spent on this Encounter   Total unit/floor time 59 minutes, time consisted of the following, examination of the patient, reviewing the record and completing documentation. >50% of time spent in counseling and coordination of care, Hospitalist Ameya Haas, SUSANA  and  RUBINA King.      Understanding of disease process:   This has been discussed with daughter Miriam.    History of Present Illness   History is obtained from the electronic health record    Cristel Turk is a 62 year old female with a past medical history of depression and dementia, who presents  to emergency room by family due to paranoia and altered mental status, This has been going on for the past few weeks. Seems to be worsening over the past several days. In the emergency room, found to have probable urinary tract infection and was treated.       Past Medical History   I have reviewed this patient's medical history and updated it with pertinent information if needed.   Past Medical History:   Diagnosis Date     Memory loss        Past Surgical History   I have reviewed this patient's surgical history and updated it with pertinent information if needed.  Past Surgical History:   Procedure Laterality Date     GI SURGERY      colonoscopy       Prior to Admission Medications   Prior to Admission Medications   Prescriptions Last Dose Informant Patient Reported? Taking?   citalopram (CELEXA) 10 MG tablet 1/27/2022 at AM  Yes Yes   Sig: Take 20 mg by mouth daily   donepezil (ARICEPT) 10 MG tablet  1/27/2022 at PM  Yes Yes   Sig: Take 10 mg by mouth At Bedtime   memantine (NAMENDA) 5 MG tablet 1/27/2022 at AM  No Yes   Sig: Take 1 tablet (5 mg) by mouth daily before breakfast   multivitamin w/minerals (MULTI-VITAMIN) tablet 1/27/2022 at AM  Yes Yes   Sig: Take 1 tablet by mouth daily      Facility-Administered Medications: None     Allergies   No Known Allergies    Social History   I have updated and reviewed the following Social History Narrative: The patient is .  Daughter, Miriam, is primary contact.  She resides in assisted living at [facility].    Social History     Social History Narrative     Not on file        History of substance use/abuse: No history of abuse.     Family History   I have reviewed this patient's family history and updated it with pertinent information if needed. She has a mother and 2 adult children.   No family history on file.    Review of Systems   Unable to assess due to confusion    Physical Exam   Temp:  [97  F (36.1  C)-98.5  F (36.9  C)] 98.3  F (36.8  C)  Pulse:  [51-66] 52  Resp:  [16-18] 18  BP: (100-130)/(54-59) 105/57  SpO2:  [97 %-100 %] 100 %  116 lbs 8 oz  Exam:  GENERAL APPEARANCE:  Alert, in no distress, appears healthy, uncooperative  ENT:  Mouth and posterior oropharynx normal, moist mucous membranes  RESP:  respiratory effort and palpation of chest normal  CV:  Palpation and auscultation of heart done , regular rate and rhythm, no murmur, rub, or gallop, no edema  M/S:   Gait and station normal  NEURO:   Cranial nerves 2-12 are normal tested and grossly at patient's baseline  PSYCH:  oriented to self    Delirium Screen/CAM:  Delirium = (#1 and #2 = YES) + (#3 and/or #4)   1) Acute onset and fluctuating course:   YES   (acute change in mental status from baseline over last 24 hours)  2) Inattention:   No   (difficulty focusing, distractible, can't follow conversation)  3) Disorganized thinking:   YES   (score only if #1 and #2 are  YES)  (rambling/irrelevant conversation, unclear/illogical thoughts, inconsistency)  4) Altered level of consciousness:   No   (score only if #1 and #2 are YES)  (other than alert, calm, cooperative)    Delirium/CAM score: 3/4  Interpretation:  1)  Delirium:  Present  2)  Type:  hypoactive  3)  Severity:  mild    Data  I have personally reviewed all labs prior to medication changes  No results found for this or any previous visit (from the past 24 hour(s)).    This patient was seen along with a nurse practitioner student, The histories and reviews of systems were obtained by her and confirmed by myself. All objective, assessments and plans were completed by myself. I have personally performed the physical exam and medical decision making. I agree with the assessment and plan of care as documented in the note    Kristi Hogan, AYUSH

## 2022-02-01 NOTE — PROGRESS NOTES
[]Hover for details    VSS. A&Ox1. Oriented to self. Less agitated in the morning compared to yesterday. Up ind. Sitter at bedside. Woke pt up at 0930. I brought pt pictures of nature and a flower in a water cup to sit in her room. Pt stated she likes nature. Items seemed to help calm her. Will try printing more nature pictures with pt later. Pt ambulated in [place] today and had a shower in the morning.    Oral Zyprexa taken in apple sauce. Would recommend crushing oral medications and putting them in apple sauce.    Psych eval scheduled for today. SW following.

## 2022-02-01 NOTE — CONSULTS
Patient seen and chart reviewed. Formal consult dictated.(F/U,polychom 11;10-11:15,I was on 5b [facility] pt at [facility] 221)    Miguelangel Quinones MD

## 2022-02-01 NOTE — CONSULTS
Consult Date: 02/01/2022    PSYCHIATRY CONSULTATION    IDENTIFICATION:  Ms. Cristel Turk she is a 62-year-old white female who is currently hospitalized with a UTI and significant paranoid psychosis associated with Alzheimer's.  I am asked to evaluate her med management by Dr. Frederick.    HISTORY OF PRESENT ILLNESS:  Prior to interviewing this patient, I had an opportunity to review the initial consultation completed by Dr. Zheng, on 01/30.  I also discussed this case at length with the head nurse and nursing staff at [facility] taking care of the patient.  It turns out that this patient is extremely paranoid and now refusing to take any oral medications.  She did receive IM Zyprexa, but in talking to the nursing staff, she continued to have paranoid psychosis.  She is not taking her p.o. medications.  The nursing staff has been giving her IV Ativan to calm her down.  Once she is calm, they can often talk her into Zyprexa Zydis; however, she is really not swallowing pills and not taking her routine Seroquel.  In discussing this case with nursing staff, we decided the most helpful regimen may be to use intravenous Haldol 2 mg and intervenous Ativan 0.5 mg first thing when the patient wakes up.  Generally, she wakes up extremely paranoid and it takes about an hour and a half to get her to be calm and take her pills.  If we start that first thing in the morning, the hope will be that throughout the day as she becomes agitated she would be more likely to take her Zyprexa Zydis, then at bedtime take another 2 mg of Haldol and 0.5 of Ativan before bed.  This way, nursing staff will be less likely to have to give IM medication, which makes the patient angry and is often perceived as punitive.  I did contact this patient's daughter and I discussed this medication regimen.  We also discussed the fact that it will be difficult to find psychiatric placement, but I have recommended consideration of a [facility] program such as the  [facility] in [place].  I also discussed this case with the primary physician.  He will be taking care of her today and tomorrow, and I recommended that he make sure to put in a consult for Dr. Zheng on Friday, as she can see the patient in person.    MENTAL STATUS EXAMINATION:  On my interview, the patient was not interested in talking to me.  She did answer a few questions from nursing staff with 1 word answers, but for the most part, she did not participate in an interview.  She was lying in her bed initially asleep.  She woke up quite quickly for nursing, but she made it clear that she did not want to talk to me through the iPad.  Recent vitals include a blood pressure of 105/57, temperature of 98.3, pulse of 52, respiration rate of 18 with 100% oxygen saturation.    The total iPad time with the patient was roughly 11:10 to 11:15.  I also discussed the case at much greater length with nursing staff and primary physician.    ASSESSMENT:  Paranoid psychosis associated with dementia.    RECOMMENDATIONS:  As the patient wakes up, please give Haldol 2 mg IV and Ativan 0.5 IV, then Olanzapine Zydis 5 mg 3 times a day p.r.n. and then Haldol 2 mg and Ativan 0.5 mg at bedtime.  Please order an in-person consult from Dr. Zheng on Friday.    Miguelangel Quinones MD        D: 2022   T: 2022   MT: [id number]    Name:     PADMINI CANNON  MRN:      [id number]        Account:      [id number]   :      1960           Consult Date: 2022     Document: [id number]

## 2022-02-01 NOTE — PROGRESS NOTES
Care Management Follow Up    Length of Stay (days): 1    Expected Discharge Date: 02/02/2022     Concerns to be Addressed: discharge planning     Patient plan of care discussed at interdisciplinary rounds: Yes    Anticipated Discharge Disposition: [facility] vs return to ?     Anticipated Discharge Services: None  Anticipated Discharge DME: None    Patient/family educated on Medicare website which has current facility and service quality ratings: no  Patient/Family in Agreement with the Plan: unable to assess    Referrals Placed by CM/SW:  None at this time  Private pay costs discussed: Not applicable    Additional Information:  SW placed call to [facility] intake, [phone]. Wait times exceed 15 minutes and was automated to leave name and number. SW awaiting a return call to make referral for [facility].     RUBINA King

## 2022-02-01 NOTE — PROGRESS NOTES
[facility]    Medicine Progress Note - Hospitalist Service    Date of Admission:  1/29/2022    Assessment & Plan          Cristel Turk is a 62 year old lady with past medical history of depression and dementia lives in an memory care unit came to [facility] on 1/29/2022 which symptoms of confusion and altered mental status going on for past week or so with decreased appetite and food intake including medications.  Daughter was concerned about her paranoia, psychosis.     Assessment & Plan     Dementia  Acute psychosis with paranoia  Depression  Appreciate psychiatry input.  Discussed with Dr. Quinones psychiatry today.  Checking baseline EKG (starting scheduled Haldol)  Haldol 2 mg IV twice daily.  Ativan 0.5 mg IV twice daily  Olnazepam 5 mg ODT 3 times daily  Continue Namenda 5 mg and Aricept 10 mg daily  Continue melatonin 3 mg at bedtime  Continue citalopram 20 mg daily  Psychiatry will follow up on Friday, 2/4/2022  Case management following.  Benefit from [facility] bed.  Inquiries made with the [facility] in [facility].  Palliative care consult     Acute urinary tract infection -- ruled out.  Urogenital arlene found in urine culture ( < 10 K CFU/mL).  Started on IV ceftriaxone 1/29/2022 stop 2/1/2022      Hypokalemia  Replete per protocol     Acute hypophosphatemia  Repleted and now normalized     Diet: Regular Diet Adult.  Replete elytes as needed.  Saline lock IV when taking p.o. well  DVT Prophylaxis: Pneumatic Compression Devices  Figueroa Catheter: Not present  Code Status: Full Code                  The patient's care was discussed with the Patient's daughter .    JOSELITO Michel [facility]  Hospitalist Service  [facility]  Securely message with the Vocera Web Console (learn more here)  Text page via [employer] Paging/Directory                     ______________________________________________________________________    Interval History   Ms.  "Rosalee was seen and examined.  Chart and imaging reviewed.  Rested well last night.  No new issues.  Discussed with psychiatry.     Data reviewed today: I reviewed all medications, new labs and imaging results over the last 24 hours. I personally reviewed all the data since admission.    Physical Exam   Vital Signs: Temp: 98.4  F (36.9  C) Temp src: Oral BP: 102/52 Pulse: 55   Resp: 17 SpO2: 99 % O2 Device: None (Room air)    Weight: 116 lbs 8 oz  General Appearance: 62-year-old female who appears stated age no acute distress at time of my assessment.  Respiratory: Bilateral breath sounds clear to auscultation no adventitious breath sounds are noted.  Cardiovascular: Regular rate and rhythm S1-S2 no S3 or S4.  GI: Soft nontender nondistended normoactive bowel sounds no appreciable hepatosplenomegaly.  Skin: No worrisome lesions.  No clubbing, cyanosis, edema.  Neuro: Alert, minimally communicative.  When asked where she was she responded \"in bed \".  Spontaneous movement of all extremities.  Follows commands intermittently.  Appears to be responding to internal stimuli.       Data   Recent Labs   Lab 02/01/22  1133 01/31/22  0609 01/30/22  0530 01/29/22  1651 01/29/22  1036   WBC  --   --  6.3  --  5.6   HGB  --   --  12.9  --  14.5   MCV  --   --  92  --  93   PLT  --   --  162  --  184   NA  --   --  144  --  141   POTASSIUM 3.8 3.4 3.5   < > 3.0*   CHLORIDE  --   --  114*  --  105   CO2  --   --  27  --  25   BUN  --   --  14  --  22   CR  --   --  0.70  --  0.75   ANIONGAP  --   --  3  --  11   MARCELA  --   --  8.4*  --  9.4   GLC  --   --  123*  --  89   ALBUMIN  --   --  2.9*  --   --    PROTTOTAL  --   --  5.3*  --   --    BILITOTAL  --   --  0.4  --   --    ALKPHOS  --   --  57  --   --    ALT  --   --  23  --   --    AST  --   --  16  --   --     < > = values in this interval not displayed.     No results found for this or any previous visit (from the past 24 hour(s)).  Medications     sodium chloride         " citalopram  20 mg Oral Daily     donepezil  10 mg Oral At Bedtime     haloperidol lactate  2 mg Intravenous BID     LORazepam  0.5 mg Intravenous BID     melatonin  3 mg Oral At Bedtime     memantine  5 mg Oral QAM AC     multivitamin w/minerals  1 tablet Oral Daily     OLANZapine zydis  5 mg Oral TID

## 2022-02-02 LAB
ATRIAL RATE - MUSE: 53 BPM
DIASTOLIC BLOOD PRESSURE - MUSE: NORMAL MMHG
INTERPRETATION ECG - MUSE: NORMAL
P AXIS - MUSE: 68 DEGREES
PR INTERVAL - MUSE: 140 MS
QRS DURATION - MUSE: 74 MS
QT - MUSE: 450 MS
QTC - MUSE: 422 MS
R AXIS - MUSE: 80 DEGREES
SYSTOLIC BLOOD PRESSURE - MUSE: NORMAL MMHG
T AXIS - MUSE: 59 DEGREES
VENTRICULAR RATE- MUSE: 53 BPM

## 2022-02-02 PROCEDURE — 99233 SBSQ HOSP IP/OBS HIGH 50: CPT | Performed by: NURSE PRACTITIONER

## 2022-02-02 PROCEDURE — 120N000004 HC R&B MS OVERFLOW

## 2022-02-02 PROCEDURE — 250N000013 HC RX MED GY IP 250 OP 250 PS 637: Performed by: NURSE PRACTITIONER

## 2022-02-02 PROCEDURE — 250N000013 HC RX MED GY IP 250 OP 250 PS 637: Performed by: INTERNAL MEDICINE

## 2022-02-02 PROCEDURE — 250N000011 HC RX IP 250 OP 636: Performed by: NURSE PRACTITIONER

## 2022-02-02 RX ORDER — LORAZEPAM 0.5 MG/1
0.5 TABLET ORAL 2 TIMES DAILY
Status: DISCONTINUED | OUTPATIENT
Start: 2022-02-02 | End: 2022-02-03

## 2022-02-02 RX ORDER — HALOPERIDOL 5 MG/ML
2 INJECTION INTRAMUSCULAR 2 TIMES DAILY
Status: DISCONTINUED | OUTPATIENT
Start: 2022-02-02 | End: 2022-02-03

## 2022-02-02 RX ORDER — LORAZEPAM 2 MG/ML
0.5 INJECTION INTRAMUSCULAR 2 TIMES DAILY
Status: DISCONTINUED | OUTPATIENT
Start: 2022-02-02 | End: 2022-02-03

## 2022-02-02 RX ORDER — HALOPERIDOL 1 MG/1
2 TABLET ORAL 2 TIMES DAILY
Status: DISCONTINUED | OUTPATIENT
Start: 2022-02-02 | End: 2022-02-03

## 2022-02-02 RX ADMIN — LORAZEPAM 0.5 MG: 0.5 TABLET ORAL at 19:30

## 2022-02-02 RX ADMIN — HALOPERIDOL LACTATE 2 MG: 5 INJECTION, SOLUTION INTRAMUSCULAR at 11:20

## 2022-02-02 RX ADMIN — LORAZEPAM 0.5 MG: 2 INJECTION INTRAMUSCULAR; INTRAVENOUS at 11:20

## 2022-02-02 RX ADMIN — OLANZAPINE 5 MG: 5 TABLET, ORALLY DISINTEGRATING ORAL at 18:10

## 2022-02-02 RX ADMIN — HALOPERIDOL 2 MG: 1 TABLET ORAL at 19:31

## 2022-02-02 RX ADMIN — DONEPEZIL HYDROCHLORIDE 10 MG: 10 TABLET ORAL at 20:53

## 2022-02-02 RX ADMIN — Medication 3 MG: at 20:53

## 2022-02-02 ASSESSMENT — ACTIVITIES OF DAILY LIVING (ADL)
ADLS_ACUITY_SCORE: 12

## 2022-02-02 NOTE — PLAN OF CARE
Pt refusing vital signs and scheduled medications. Loss of IV access, pt was willing to have IV flushed however pt unwilling to have new IV placed. Due to loss of IV access writer spoke with provider to have oral medications (haldol and ativan) ordered. However, pt unwilling to take these medications. Would attempt to crush medications and put them in pudding or applesauce but pt refusing to eat. Sitter at bedside. Daughter was here visiting and daughter helped pt take a shower. Good appetite for dinner, pt ate x2 trays ate 75% of both trays. Psych consult today- haldol/ativan/zyprexa added however, pt unwilling to take them. Pt is steady on her feet, up independently but sitter with pt for safety. Plan- palliative consult, potassium/magnesium/phosphorus protocols, SW following for discharge planning- likely [facility], psych will follow-up on Friday.

## 2022-02-02 NOTE — PROGRESS NOTES
Care Management Follow Up    Length of Stay (days): 2    Expected Discharge Date: 02/04/22     Concerns to be Addressed: discharge planning     Patient plan of care discussed at interdisciplinary rounds: Yes    Anticipated Discharge Disposition: Memory Care vs [facility]     Anticipated Discharge Services: None  Anticipated Discharge DME: None    Patient/family educated on Medicare website which has current facility and service quality ratings: no  Patient/Family in Agreement with the Plan: unable to assess    Referrals Placed by CM/SW:  [facility]  Private pay costs discussed: Not applicable    Additional Information:  SW placed call to [facility] Psych intake, [phone], they are at capacity for all beds within the [facility] system today and requested that SW call back tomorrow, 2/3, around 10am.     Psych to evaluate again in person on Friday.     SW will continue to follow.     1540: CATHERINE called [facility] admissions, [phone]. Spoke with Wei. Discussed referral for pt to [facility] psych. They do have availability but RN and MD need to review patient. Requested that the following info be faxed: H&P, MD & RN notes, Psych notes, COVID test results, TSH, CBC, CMP, EKG, and UA. They cannot accept patients with sebastian, O2, or IV needs. CATHERINE faxed to (f) [phone]. Will follow up tomorrow.     RUBINA King

## 2022-02-02 NOTE — PLAN OF CARE
Care from 0700 - 1500:    Pt A&O to self, Calm. Refused AM PO medication, d/w provider, IV placement requested by provider for administration of IV haldol and ativan. Psych to follow-up with pt on Friday, 2/4. Palliative consulted, CC/SW following. Up independently, bedside attendant present.

## 2022-02-02 NOTE — PLAN OF CARE
Problem: Adult Inpatient Plan of Care  Goal: Optimal Comfort and Wellbeing  Outcome: Improving     Problem: Fall Injury Risk  Goal: Absence of Fall and Fall-Related Injury  Outcome: Improving     Problem: Pain Acute  Goal: Acceptable Pain Control and Functional Ability  Outcome: Improving     Pt is disoriented to place, time and situation-- oriented to self only. She is RA, clear lungs sound, sats are in the 90s. Pt has sitter at bedside d/t to confusion and paranoia, she denied having hallucinations this shift. She remains on mag, k and phos protocols--RN managed. Pt refused 4 am vitals and assessment-- she said its too early. Pt denied pain this shift. Will continue to monitor.

## 2022-02-02 NOTE — PROGRESS NOTES
[facility]    Medicine Progress Note - Hospitalist Service    Date of Admission:  1/29/2022    Assessment & Plan          Cristel Turk is a 62 year old lady with past medical history of depression and dementia lives in an memory care unit came to [facility] on 1/29/2022 which symptoms of confusion and altered mental status going on for past week or so with decreased appetite and food intake including medications.  Daughter was concerned about her paranoia, psychosis.     24 hour events: Yesterday evening patient was refusing vital signs and scheduled meds.  IV access was lost.  She was willing to have her IV flushed however upon willing to have a new IV placed.  And due to loss of IV meds were changed to oral (Haldol and Ativan).  She was unwilling to take these medications.  Attempts were made to crush medications and put them in pudding or applesauce but she refused to eat.  She is required a sitter.  Daughter was here visiting and daughter help patient take a shower.  She had a good appetite for dinner and ate 75% of 2 trays.    Assessment & Plan     Dementia  Acute psychosis with paranoia  Depression  Appreciate psychiatry input.  Discussed with Dr. Quinones psychiatry today.  Baseline EKG (starting scheduled Haldol) with normal QTc.    Haldol 2 mg IV/PO twice daily.  Ativan 0.5 mg IV/PO twice daily  Olnazepam 5 mg ODT 3 times daily  Continue Namenda 5 mg and Aricept 10 mg daily  Continue melatonin 3 mg at bedtime  Continue citalopram 20 mg daily  Psychiatry will follow up on Friday, 2/4/2022 per discussion with Dr. Quinones psychiatry  Case management following.  Benefit from [facility] bed.  Inquiries made with the [facility] in [facility].  If unable to discharge to [facility] 20 to look at discharging either to memory care versus home with family pending medication stabilization.  However at this time she continues to refuse medications intermittently.  Palliative  care consult.     Acute urinary tract infection -- ruled out.  Urogenital arlene found in urine culture ( < 10 K CFU/mL).  Started on IV ceftriaxone 1/29/2022 stop 2/1/2022      Hypokalemia  Replete per protocol.     Acute hypophosphatemia  Repleted and now normalized.     Diet: Regular Diet Adult.  Replete elytes as needed.  Saline lock IV.  DVT Prophylaxis: Pneumatic Compression Devices  Figueroa Catheter: Not present  Code Status: Full Code            The patient's care was discussed with the Patient's daughter .    JOSELITO Michel [facility]  Hospitalist Service  [facility]  Securely message with the Vocera Web Console (learn more here)  Text page via [employer] Paging/Directory                   ______________________________________________________________________    Interval History   Ms. Turk was seen and examined.  Chart and imaging reviewed.  Rested well last night.  No new issues.  Discussed with psychiatry.     Data reviewed today: I reviewed all medications, new labs and imaging results over the last 24 hours. I personally reviewed all the data since admission.    Physical Exam   Vital Signs: Temp: 97.8  F (36.6  C) Temp src: Oral BP: 100/60 Pulse: 53   Resp: 16 SpO2: 98 % O2 Device: None (Room air)    Weight: 116 lbs 8 oz  General Appearance: 62-year-old female who appears stated age no acute distress at time of my assessment.  Respiratory: Bilateral breath sounds clear to auscultation no adventitious breath sounds are noted.  Cardiovascular: Regular rate and rhythm S1-S2 no S3 or S4.  GI: Soft nontender nondistended normoactive bowel sounds no appreciable hepatosplenomegaly.  Skin: No worrisome lesions.  No clubbing, cyanosis, edema.  Neuro: Alert, follows commands for this provider.  Intermittently answers questions.  Recognizes daughter.  Has been behaviorally appropriate during the day shift.    Data   Recent Labs   Lab 02/01/22  1133 01/31/22  0609 01/30/22  0530 01/29/22  1651  01/29/22  1036   WBC  --   --  6.3  --  5.6   HGB  --   --  12.9  --  14.5   MCV  --   --  92  --  93   PLT  --   --  162  --  184   NA  --   --  144  --  141   POTASSIUM 3.8 3.4 3.5   < > 3.0*   CHLORIDE  --   --  114*  --  105   CO2  --   --  27  --  25   BUN  --   --  14  --  22   CR  --   --  0.70  --  0.75   ANIONGAP  --   --  3  --  11   MARCELA  --   --  8.4*  --  9.4   GLC  --   --  123*  --  89   ALBUMIN  --   --  2.9*  --   --    PROTTOTAL  --   --  5.3*  --   --    BILITOTAL  --   --  0.4  --   --    ALKPHOS  --   --  57  --   --    ALT  --   --  23  --   --    AST  --   --  16  --   --     < > = values in this interval not displayed.       EKG: SB with normal QTc 422 ms.    No results found for this or any previous visit (from the past 24 hour(s)).  Medications       citalopram  20 mg Oral Daily     donepezil  10 mg Oral At Bedtime     haloperidol lactate  2 mg Intravenous BID    Or     haloperidol  2 mg Oral BID     LORazepam  0.5 mg Intravenous BID    Or     LORazepam  0.5 mg Oral BID     melatonin  3 mg Oral At Bedtime     memantine  5 mg Oral QAM AC     multivitamin w/minerals  1 tablet Oral Daily     OLANZapine zydis  5 mg Oral TID

## 2022-02-03 ENCOUNTER — DOCUMENTATION ONLY (OUTPATIENT)
Dept: OTHER | Facility: CLINIC | Age: 62
End: 2022-02-03

## 2022-02-03 PROCEDURE — 250N000013 HC RX MED GY IP 250 OP 250 PS 637: Performed by: NURSE PRACTITIONER

## 2022-02-03 PROCEDURE — 250N000013 HC RX MED GY IP 250 OP 250 PS 637: Performed by: PHYSICIAN ASSISTANT

## 2022-02-03 PROCEDURE — 120N000004 HC R&B MS OVERFLOW

## 2022-02-03 PROCEDURE — 99232 SBSQ HOSP IP/OBS MODERATE 35: CPT | Performed by: NURSE PRACTITIONER

## 2022-02-03 RX ORDER — OLANZAPINE 5 MG/1
15 TABLET, ORALLY DISINTEGRATING ORAL 2 TIMES DAILY
Status: DISCONTINUED | OUTPATIENT
Start: 2022-02-04 | End: 2022-02-08 | Stop reason: HOSPADM

## 2022-02-03 RX ORDER — OLANZAPINE 5 MG/1
10 TABLET, ORALLY DISINTEGRATING ORAL
Status: COMPLETED | OUTPATIENT
Start: 2022-02-03 | End: 2022-02-03

## 2022-02-03 RX ORDER — OLANZAPINE 5 MG/1
15 TABLET, ORALLY DISINTEGRATING ORAL 2 TIMES DAILY
Status: DISCONTINUED | OUTPATIENT
Start: 2022-02-03 | End: 2022-02-03

## 2022-02-03 RX ORDER — HALOPERIDOL 5 MG/ML
2 INJECTION INTRAMUSCULAR EVERY 6 HOURS PRN
Status: DISCONTINUED | OUTPATIENT
Start: 2022-02-03 | End: 2022-02-04

## 2022-02-03 RX ORDER — OLANZAPINE 5 MG/1
10 TABLET, ORALLY DISINTEGRATING ORAL 3 TIMES DAILY
Status: DISCONTINUED | OUTPATIENT
Start: 2022-02-03 | End: 2022-02-03

## 2022-02-03 RX ORDER — OLANZAPINE 5 MG/1
5 TABLET, ORALLY DISINTEGRATING ORAL ONCE
Status: DISCONTINUED | OUTPATIENT
Start: 2022-02-03 | End: 2022-02-03

## 2022-02-03 RX ORDER — OLANZAPINE 5 MG/1
15 TABLET, ORALLY DISINTEGRATING ORAL AT BEDTIME
Status: DISCONTINUED | OUTPATIENT
Start: 2022-02-03 | End: 2022-02-04

## 2022-02-03 RX ORDER — OLANZAPINE 10 MG/2ML
10 INJECTION, POWDER, FOR SOLUTION INTRAMUSCULAR ONCE
Status: DISCONTINUED | OUTPATIENT
Start: 2022-02-03 | End: 2022-02-04

## 2022-02-03 RX ORDER — OLANZAPINE 5 MG/1
10 TABLET, ORALLY DISINTEGRATING ORAL ONCE
Status: DISCONTINUED | OUTPATIENT
Start: 2022-02-03 | End: 2022-02-03

## 2022-02-03 RX ORDER — OLANZAPINE 10 MG/2ML
5 INJECTION, POWDER, FOR SOLUTION INTRAMUSCULAR 2 TIMES DAILY PRN
Status: DISCONTINUED | OUTPATIENT
Start: 2022-02-03 | End: 2022-02-03

## 2022-02-03 RX ADMIN — HALOPERIDOL 2 MG: 1 TABLET ORAL at 09:49

## 2022-02-03 RX ADMIN — OLANZAPINE 10 MG: 5 TABLET, ORALLY DISINTEGRATING ORAL at 17:45

## 2022-02-03 RX ADMIN — OLANZAPINE 5 MG: 5 TABLET, ORALLY DISINTEGRATING ORAL at 09:49

## 2022-02-03 RX ADMIN — LORAZEPAM 0.5 MG: 0.5 TABLET ORAL at 12:21

## 2022-02-03 ASSESSMENT — ACTIVITIES OF DAILY LIVING (ADL)
ADLS_ACUITY_SCORE: 12
DIFFICULTY_COMMUNICATING: NO
DRESSING/BATHING_DIFFICULTY: NO
ADLS_ACUITY_SCORE: 12
DOING_ERRANDS_INDEPENDENTLY_DIFFICULTY: YES
ADLS_ACUITY_SCORE: 12
CONCENTRATING,_REMEMBERING_OR_MAKING_DECISIONS_DIFFICULTY: YES
TOILETING_ISSUES: NO
ADLS_ACUITY_SCORE: 12
DIFFICULTY_EATING/SWALLOWING: NO
FALL_HISTORY_WITHIN_LAST_SIX_MONTHS: NO
ADLS_ACUITY_SCORE: 12
ADLS_ACUITY_SCORE: 12
ADLS_ACUITY_SCORE: 6
ADLS_ACUITY_SCORE: 12
ADLS_ACUITY_SCORE: 12
WEAR_GLASSES_OR_BLIND: NO
ADLS_ACUITY_SCORE: 12
WALKING_OR_CLIMBING_STAIRS_DIFFICULTY: NO
WHICH_OF_THE_ABOVE_FUNCTIONAL_RISKS_HAD_A_RECENT_ONSET_OR_CHANGE?: COGNITION
ADLS_ACUITY_SCORE: 12
ADLS_ACUITY_SCORE: 6

## 2022-02-03 NOTE — PLAN OF CARE
Oriented to self only. Son here visiting and they frequently ambulated in the hallway. Pleasant and cooperative. Crushed meds and put them in applesauce. Pt agreeable to this. Up Independently- sitter at bedside for safety. Pt cooperative with assessment as well. Plan- psych will follow-up on Friday, SW following for likely [facility].

## 2022-02-03 NOTE — PROGRESS NOTES
"    [facility]  Palliative Care Progress Note  Text Page     Assessment & Plan    Cristel Turk is a 62 year old female who was admitted on 1/29/2022.   Consulted by Ameya MEZA to assist with symptom management, goals of care, and development of plan of care.    Recommendations:  1. Goals of Care- Full Code  Hospitalization goals discussed discussed overall goals with daughter.  She will discuss with brother further and contact later today with changes in code status  who reports that she would like to figure out what the plans are with her mom before talking about further goals. Family care conference set for tomorrow 2/4/22 1 pm     Decisional Capacity- Unreliable, There is an advance directive in the EMR dated 11/20/2020. Co primary agents are daughter Mriiam Taveras and son Sreedhar GREEN will need to complete discharge     2. Delirium, psychosis: mimimal improvement   -Haloperidol 2 mg IV or tablet(s) twice daily  -Lorazepam 0.5 mg bid  -olanzapine ODT 5 mg tid   -appreciate input from psychiatry Dr. Rivers and Joni  Anticipate [facility] admission when bed available   -sitter   3. Malnutrition  -small frequent meal, high protein snacks,   -Appreciate the input of dietician for on going recommendations       4. Spiritual Care  Oriented to Spiritual Health as part of Palliative Care team. Spiritual Health Services declined at this time.  Spiritual Background: Yarsani      5. Care Planning  Appreciate Care of Mary Eid, for discharge planning as able.        Medical Decision Making and Goals of Care:  Discussed on February 1, 2022 with Kristi YEE, CNP: Met with patient in her room, she is unable to engage in conversation. She answers \"I don't know\" to all questions including are you in pain. Phone call placed to patients' daughter Miriam. Discussed and reviewed my roll in the team. Discussed her understanding of the diagnosis and what to expect. Discussed overall " "goals Miriam reports that they have a  Directive however her mom was unable to list as many \"care specific\" goals in her directive. She stated that she understands that she may need to change the plan in the future but she needs to figure out what psych thinks is appropriate before talking about further goals of care. Asked about a directive and she stated she will e-mail to me. She was gien my number should questions arise. Discussed that we will follow up in the future while her mom is still here. Questions asked and answered    Keli Sanchez-Humberto YEE CNP  Pain Management and Palliative Care  [facility]  Pgr: [phone]      Time Spent on this Encounter   Total unit/floor time 35 minutes, time consisted of the following, examination of the patient, reviewing the record and completing documentation. >50% of time spent in counseling and coordination of care, Bedside Nurse Shabnam Bowers RN  and Hospitalist  Leslie Harris PA-C .  Time spend counseling with patient and family consisted of the following topics, goals of care, care planning for discharge and symptom management.    Review of Systems    CONSTITUTIONAL: NEGATIVE for fever, chills, change in weight  ENT/MOUTH: NEGATIVE for ear, mouth and throat problems  RESP: NEGATIVE for significant cough or SOB  CV: NEGATIVE for chest pain, palpitations or peripheral edema    Palliative Symptom Review (0=no symptom/no concern, 1=mild, 2=moderate, 3=severe):      Pain: 0-none      Fatigue: 0-none      Nausea: 0-none      Constipation: 0-none, BM today       Diarrhea: 0-none      Depressive Symptoms: 0-none      Anxiety: 1-mild      Drowsiness: 0-none      Poor Appetite: 2-moderate 25- 75%       Shortness of Breath: 0-none      Insomnia: 2-moderate      Other:delirium  3-severe      Overall (0 good/no concerns, 3 very poor):  2    Physical Exam   Temp:  [97.7  F (36.5  C)] 97.7  F (36.5  C)  Pulse:  [68-74] 68  Resp:  [16] " 16  BP: (122-131)/(61-63) 131/63  SpO2:  [99 %-100 %] 100 %  116 lbs 8 oz  Exam:  GEN:  Alert, oriented x 1, appears comfortable, NAD. Poor speech output , non sensical   HEENT:  Normocephalic/atraumatic, no scleral icterus, no nasal discharge, mouth moist.  CV:.  +3 DP/PT pulses bilatererally; no edema BLE.  RESP:  .  Symmetric chest rise on inhalation noted.  Normal respiratory effort.  EXT:  Edema & pulses as noted above.  CMS intact x 4.     M/S:   SEGOVIA x 4     SKIN:  Dry to touch, no exanthems noted in the visualized areas.    NEURO: Symmetric movement   Psych:  Agitated, paranoid  affect.  conversant inappropriately.    Medications       citalopram  20 mg Oral Daily     donepezil  10 mg Oral At Bedtime     haloperidol lactate  2 mg Intravenous BID    Or     haloperidol  2 mg Oral BID     LORazepam  0.5 mg Intravenous BID    Or     LORazepam  0.5 mg Oral BID     melatonin  3 mg Oral At Bedtime     memantine  5 mg Oral QAM AC     multivitamin w/minerals  1 tablet Oral Daily     OLANZapine zydis  5 mg Oral TID       Data   No results found for this or any previous visit (from the past 24 hour(s)).

## 2022-02-03 NOTE — PROGRESS NOTES
[facility]  Hospitalist Progress Note    Assessment & Plan   Cristel Turk is a 62 year old lady with past medical history of depression, Alzheimer's dementia lives in an memory care unit came to [facility] on 1/29/2022 with behavior changes described as increased paranoia and confusion.     Dementia with Acute behavior disturbances  Psychosis, Paranoia   Presented with several weeks of reports of increased confusion,  paranoia with agitation.  History of early onset Alzheimer's dementia. Baseline is advanced memory loss.  She remains with visual hallucinations and paranoia.  No agitation or combativeness.  No evidence of urinary tract infection or acute illnesses identified precipitating behavior disturbance. ]See neg Ucx (1/29)    Prior to this admission was taking Celexa, donepezil and Namenda.  She was prescribed trazodone prior to admission but had not yet started.     -Psychiatry has been consulted in her care, Dr. Zhegn saw on 1/30, Dr. Quinones on 2/1.  -she remains admitted for further cares and would be suitable for inpatient psychiatric care if unable to return facility, not improved with medication adjustments here     Medication management this admission:  ->1/30 Seroquel, refusing PO, given IM Zyprexa, IV ativan -> somewhat improved with zyprexa but not reliabling taking PO remained with paranoid psychosis  ->2/1 started on ativan/iv haldol per Dr. Quinones, getting scheduled IV to avoid IM, Zyprexa TID (QTC WNL)  Haldol 2 mg IV twice daily.  Ativan 0.5 mg IV twice daily  Olnazepam 5 mg ODT 3 times daily  Continue Namenda 5 mg and Aricept 10 mg daily  Continue melatonin 3 mg at bedtime  Continue citalopram 20 mg daily  -> 2/2 lost IV access, replaced IV given lack of PO med compliance -> still requiring bedside attendant  -> 2/3 -> Intake coord. With Psychiatry (Haylie) Indicating would not accept patient if still requiring haldol IV rx, last given 2/2 11:20 was haldol  + ativan. See below for further requests.   Pt remains resistant to PO medications, cooperative with encouragement from nursing. Paranoia increased with ativan.   Discussed with Dr. Quinones recommendations to avoid IV medications who advised increased dose of Zyprexa (15 mg PO BID) with IM Zyprexa if needed.     -> for now will increase Zyprexa to 15 mg BID, however this is maxing recc. Dose of Zyprexa daily so would not recommend additional zyprexa if need additional pharmaceutical intervention will continue Haldol prn   ->reconsult Dr. Zheng for further recommendations 2/4    -Stop donepezil for now  -Palliative Care is following, they anticipate care conference Friday  -We will also reconsult for Dr. Zheng to see on Friday (2/4).  -bedside attendant PRN.   -Social work following    Hypokalemia  Replaced per protocol.      Acute hypophosphatemia: replaced. Improved.        DVT Prophylaxis: Ambulate every shift  Code Status: Full Code  Expected Discharge: 02/04/2022     Anticipated discharge location: inpatient psychiatric care, PM of 2/4/2022 once accepted ->     Social work notified writer today that the inpatient psychiatric care intake had requested that we repeat her urinalysis despite her having a negative urine culture. I called and spoke with Haylie (who had requested this) I explained that the patient has a negative urine culture and no symptoms of infection or UTI and that we would not be reordering a urinalysis in the absence of this.  Per social work facility is requesting urinalysis, repeat lab work and a repeat Covid swab and that the patient be seen by psychiatry again before considering excepting her into inpatient psychiatric care.  This is inappropriate as there is no medical indication for these things at this time but will order to facilitate transfer to the appropriate care that this patient requires.    Leslie Harris PA-C      Interval History   Afebrile.  No nonverbal demonstrations of pain.  Eating, drinking. Denies complaints. Cooperative with nursing staff, taking PO meds with encouragement.      No irritative voiding symptoms or urinary frequency noted by staff.  Has not required any IV medications today.  No agitation or combativeness.    Pt is noted to be experiencing ongoing visual hallucinations, people in room etc pt is not aware of these.     -Data reviewed today: I reviewed all new labs and imaging results over the last 24 hours. Admit EKG with qtc 422 ms.  Physical Exam   Temp: 97.7  F (36.5  C) Temp src: Oral BP: 131/63 Pulse: 68   Resp: 16 SpO2: 100 % O2 Device: None (Room air)    Vitals:    01/29/22 1145 01/29/22 1556   Weight: 51.9 kg (114 lb 6.7 oz) 52.8 kg (116 lb 8 oz)     Vital Signs with Ranges  Temp:  [97.7  F (36.5  C)] 97.7  F (36.5  C)  Pulse:  [68-74] 68  Resp:  [16] 16  BP: (122-131)/(61-63) 131/63  SpO2:  [99 %-100 %] 100 %  No intake/output data recorded.      Constitutional:Awake, alert, no apparent distress  Respiratory:  Normal work of breathing. Lungs clear to auscultation bilaterally, no crackles or wheezing.  Cardiovascular: Regular rate and rhythm, normal S1 and S2, and no murmur appreciated.   GI: Normal bowel sounds, soft, non-distended, non-tender.   Skin/Integument: No rashes, no cyanosis, no peripheral edema.  Neuro: Alert.  Oriented to self.  Follows commands.  Intermittently answers questions. Moving all extremities with normal strength. Coordination and sensation grossly intact. Speech clear. No focal deficits.   Psych: Cooperative.  Bedside nursing report the patient is experiencing visual hallucinations.  Cannot provide why she is in the hospital.        Medications       citalopram  20 mg Oral Daily     melatonin  3 mg Oral At Bedtime     memantine  5 mg Oral QAM AC     multivitamin w/minerals  1 tablet Oral Daily     OLANZapine  10 mg Intramuscular Once     OLANZapine zydis  15 mg Oral BID       Data   Recent Labs   Lab 02/01/22  1133 01/31/22  0609  01/30/22  0530 01/29/22  1651 01/29/22  1036   WBC  --   --  6.3  --  5.6   HGB  --   --  12.9  --  14.5   MCV  --   --  92  --  93   PLT  --   --  162  --  184   NA  --   --  144  --  141   POTASSIUM 3.8 3.4 3.5   < > 3.0*   CHLORIDE  --   --  114*  --  105   CO2  --   --  27  --  25   BUN  --   --  14  --  22   CR  --   --  0.70  --  0.75   ANIONGAP  --   --  3  --  11   MARCELA  --   --  8.4*  --  9.4   GLC  --   --  123*  --  89   ALBUMIN  --   --  2.9*  --   --    PROTTOTAL  --   --  5.3*  --   --    BILITOTAL  --   --  0.4  --   --    ALKPHOS  --   --  57  --   --    ALT  --   --  23  --   --    AST  --   --  16  --   --     < > = values in this interval not displayed.       No results found for this or any previous visit (from the past 24 hour(s)).

## 2022-02-03 NOTE — PROGRESS NOTES
Care Management Follow Up    Length of Stay (days): 3    Expected Discharge Date: 02/04/2022     Concerns to be Addressed: [facility]      Patient plan of care discussed at interdisciplinary rounds: Yes    Anticipated Discharge Disposition: [facility] vs Memory care     Anticipated Discharge Services: None  Anticipated Discharge DME: None    Patient/family educated on Medicare website which has current facility and service quality ratings: no  Education Provided on the Discharge Plan:  Yes  Patient/Family in Agreement with the Plan: unable to assess    Referrals Placed by CM/SW: [facility]    Private pay costs discussed: Not applicable    Additional Information:  CATHERINE received call from JO-ANN Stallings at [facility] ([phone]). She had questions about RN's documentation of agitation, requested UA be sent with update as UTI has been ruled out. Also requested clarification about pneumatic compression device ordered. Spoke with bedside RN and returned call to Haylie with further info. Reviewed Psych's note and recs with request for IV/IM meds d/t patient being noncompliant with oral meds and having paranoia. Clarified that she has not had any verbal or physical aggression.     CATHERINE re-faxed urine culture and UA and updated RN notes to (f) [phone]. Their provider will review and return call to CATHERINE.     1445: CATHERINE spoke with JO-ANN Stallings at [facility]. Her and provider would like to reassess after psych re-assessment tomorrow. They are also requiring a new UA, updated COVID swab, and new updated labs prior to being able to accept. Provider updated.     RUBINA King

## 2022-02-03 NOTE — PLAN OF CARE
Patient is only alert to self- disoriented to place, time, and situation. Sitter at bedside, patient has been resting comfortably in bed. No PRN meds needed during the night, SW following for possible [facility] placement, psych also following, palliative care consult in, patient tolerating a regular diet, will continue to monitor    Vital signs:  Temp: 97.8  F (36.6  C) Temp src: Oral BP: 122/61 Pulse: 74   Resp: 16 SpO2: 99 % O2 Device: None (Room air)

## 2022-02-03 NOTE — PLAN OF CARE
Care from 0700 - 1900:    Pt A&O to self. Up ad olesya in room, ambulates in parkinson w/ staff supervision. Denies pain. Tolerating diet. PIV SL. Psych, palliative, and SW/CC following. Pt took 2 of AM scheduled PO meds w/ persistent and patient attempt, pt continued to ask questions about what the medications were for and why she needed to take them, and after some time was agreeable to take. Similar encounter with medication at noon. Pt was calm and redirectable throughout shift. Had a few episode of feeling mildly anxious and paranoid, calmly shared frustrations of being in this hospital room. Bedside attendant for safety. Daughter visited this evening and assisted w/ medication administration. Psych follow-up tomorrow, hopeful discharge to [facility]. SW/CC following.

## 2022-02-04 LAB
ALBUMIN UR-MCNC: 10 MG/DL
ANION GAP SERPL CALCULATED.3IONS-SCNC: 4 MMOL/L (ref 3–14)
APPEARANCE UR: CLEAR
BACTERIA #/AREA URNS HPF: ABNORMAL /HPF
BILIRUB UR QL STRIP: NEGATIVE
BUN SERPL-MCNC: 13 MG/DL (ref 7–30)
CALCIUM SERPL-MCNC: 8.8 MG/DL (ref 8.5–10.1)
CHLORIDE BLD-SCNC: 110 MMOL/L (ref 94–109)
CO2 SERPL-SCNC: 27 MMOL/L (ref 20–32)
COLOR UR AUTO: YELLOW
CREAT SERPL-MCNC: 0.67 MG/DL (ref 0.52–1.04)
ERYTHROCYTE [DISTWIDTH] IN BLOOD BY AUTOMATED COUNT: 13 % (ref 10–15)
GFR SERPL CREATININE-BSD FRML MDRD: >90 ML/MIN/1.73M2
GLUCOSE BLD-MCNC: 89 MG/DL (ref 70–99)
GLUCOSE UR STRIP-MCNC: NEGATIVE MG/DL
HCT VFR BLD AUTO: 40.8 % (ref 35–47)
HGB BLD-MCNC: 13.5 G/DL (ref 11.7–15.7)
HGB UR QL STRIP: NEGATIVE
HOLD SPECIMEN: NORMAL
HYALINE CASTS: 4 /LPF
KETONES UR STRIP-MCNC: NEGATIVE MG/DL
LEUKOCYTE ESTERASE UR QL STRIP: ABNORMAL
MCH RBC QN AUTO: 31.4 PG (ref 26.5–33)
MCHC RBC AUTO-ENTMCNC: 33.1 G/DL (ref 31.5–36.5)
MCV RBC AUTO: 95 FL (ref 78–100)
MUCOUS THREADS #/AREA URNS LPF: PRESENT /LPF
NITRATE UR QL: NEGATIVE
PH UR STRIP: 5.5 [PH] (ref 5–7)
PLATELET # BLD AUTO: 141 10E3/UL (ref 150–450)
POTASSIUM BLD-SCNC: 4.6 MMOL/L (ref 3.4–5.3)
RBC # BLD AUTO: 4.3 10E6/UL (ref 3.8–5.2)
RBC URINE: 5 /HPF
SARS-COV-2 RNA RESP QL NAA+PROBE: NEGATIVE
SODIUM SERPL-SCNC: 141 MMOL/L (ref 133–144)
SP GR UR STRIP: 1.02 (ref 1–1.03)
SQUAMOUS EPITHELIAL: 1 /HPF
UROBILINOGEN UR STRIP-MCNC: NORMAL MG/DL
WBC # BLD AUTO: 6.4 10E3/UL (ref 4–11)
WBC URINE: 5 /HPF

## 2022-02-04 PROCEDURE — 85027 COMPLETE CBC AUTOMATED: CPT | Performed by: PHYSICIAN ASSISTANT

## 2022-02-04 PROCEDURE — 80048 BASIC METABOLIC PNL TOTAL CA: CPT | Performed by: PHYSICIAN ASSISTANT

## 2022-02-04 PROCEDURE — 120N000004 HC R&B MS OVERFLOW

## 2022-02-04 PROCEDURE — 99233 SBSQ HOSP IP/OBS HIGH 50: CPT | Performed by: NURSE PRACTITIONER

## 2022-02-04 PROCEDURE — 99358 PROLONG SERVICE W/O CONTACT: CPT | Performed by: NURSE PRACTITIONER

## 2022-02-04 PROCEDURE — 250N000013 HC RX MED GY IP 250 OP 250 PS 637: Performed by: INTERNAL MEDICINE

## 2022-02-04 PROCEDURE — 87635 SARS-COV-2 COVID-19 AMP PRB: CPT | Performed by: PHYSICIAN ASSISTANT

## 2022-02-04 PROCEDURE — 81001 URINALYSIS AUTO W/SCOPE: CPT | Performed by: PHYSICIAN ASSISTANT

## 2022-02-04 PROCEDURE — 36415 COLL VENOUS BLD VENIPUNCTURE: CPT | Performed by: PHYSICIAN ASSISTANT

## 2022-02-04 PROCEDURE — 250N000013 HC RX MED GY IP 250 OP 250 PS 637: Performed by: PHYSICIAN ASSISTANT

## 2022-02-04 RX ORDER — HALOPERIDOL 5 MG/ML
2 INJECTION INTRAMUSCULAR EVERY 6 HOURS PRN
Status: DISCONTINUED | OUTPATIENT
Start: 2022-02-04 | End: 2022-02-08 | Stop reason: HOSPADM

## 2022-02-04 RX ADMIN — Medication 3 MG: at 20:29

## 2022-02-04 RX ADMIN — CITALOPRAM HYDROBROMIDE 20 MG: 20 TABLET ORAL at 09:56

## 2022-02-04 RX ADMIN — MEMANTINE 5 MG: 5 TABLET ORAL at 09:56

## 2022-02-04 RX ADMIN — MULTIPLE VITAMINS W/ MINERALS TAB 1 TABLET: TAB at 09:56

## 2022-02-04 RX ADMIN — OLANZAPINE 15 MG: 5 TABLET, ORALLY DISINTEGRATING ORAL at 09:56

## 2022-02-04 RX ADMIN — OLANZAPINE 15 MG: 5 TABLET, ORALLY DISINTEGRATING ORAL at 20:29

## 2022-02-04 ASSESSMENT — ACTIVITIES OF DAILY LIVING (ADL)
ADLS_ACUITY_SCORE: 6

## 2022-02-04 NOTE — PROGRESS NOTES
[facility]  Hospitalist Progress Note    Assessment & Plan   Cristel Turk is a 62 year old lady with past medical history of depression, Alzheimer's dementia lives in an memory care unit came to [facility] on 1/29/2022 with behavior changes described as increased paranoia and confusion.     Dementia with Acute behavior disturbances  Psychosis, Paranoia   Presented with several weeks of reports of increased confusion,  paranoia with agitation.  History of early onset Alzheimer's dementia. Baseline is advanced memory loss.  She remains with visual hallucinations and paranoia.  No agitation or combativeness.  No evidence of urinary tract infection or acute illnesses identified precipitating behavior disturbance. ]See neg Ucx (1/29)    Prior to this admission was taking Celexa, donepezil and Namenda.  She was prescribed trazodone prior to admission but had not yet started.     -Psychiatry has been consulted in her care, Dr. Zheng saw on 1/30, Dr. Quinones on 2/1.  -she remains admitted for further cares and would be suitable for inpatient psychiatric care if unable to return facility, not improved with medication adjustments here     Medication management this admission:  ->1/30 Seroquel, refusing PO, given IM Zyprexa, IV ativan -> somewhat improved with zyprexa but not reliabling taking PO remained with paranoid psychosis  ->2/1 started on ativan/iv haldol per Dr. Quinones, getting scheduled IV to avoid IM, Zyprexa TID (QTC WNL)  Haldol 2 mg IV twice daily.  Ativan 0.5 mg IV twice daily  Olnazepam 5 mg ODT 3 times daily  Continue Namenda 5 mg and Aricept 10 mg daily  Continue melatonin 3 mg at bedtime  Continue citalopram 20 mg daily  -> 2/2 lost IV access, replaced IV given lack of PO med compliance -> still requiring bedside attendant  -> 2/3 -> Intake coord. With Psychiatry (Haylie) Indicating would not accept patient if still requiring haldol IV rx, last given 2/2 11:20 was haldol  + ativan.   Pt remains resistant to PO medications at times, cooperative with encouragement from nursing. Paranoia increased with ativan. Appears much better off Ativan.  Discussed with Dr. Quinones recommendations to avoid IV medications who advised increased dose of Zyprexa (15 mg PO BID) with IM Zyprexa if needed. For now will increase Zyprexa to 15 mg BID, however this is maxing recc. Dose of Zyprexa daily so would not recommend additional zyprexa if need additional pharmaceutical intervention will continue Haldol prn.  Donepezil discontinued.   2/4 reconsult Dr. Zheng pending.  Per [facility] team at [facility] ([place]) will need repeat labs including UA and repeat COVID. She refused labs this morning.  Palliative care meeting at 1300 with family.     -- Repeat labs as above. Does not appear to have an infectious cause.  -- Awaiting inpatient psych reconsult today.  Follow up with PC regarding outcome of family conference.  -- Continue to provide redirection -- is doing better.     Hypokalemia  Replaced per protocol.      Acute hypophosphatemia: replaced. Improved.        DVT Prophylaxis: Ambulate every shift  Code Status: Full Code  Expected Discharge: 02/04/2022     Anticipated discharge location: inpatient psychiatric care, PM of 2/4/2022 once accepted.    JOSELITO Michel CNP      Interval History   No acute events overnight.   Denies any new symptoms.  Pending acceptance with [facility].       -Data reviewed today: I reviewed all new labs and imaging results over the last 24 hours.     Physical Exam   Temp: 98.1  F (36.7  C) Temp src: Oral BP: 116/63 Pulse: 51   Resp: 16 SpO2: 100 % O2 Device: None (Room air)    Vitals:    01/29/22 1145 01/29/22 1556   Weight: 51.9 kg (114 lb 6.7 oz) 52.8 kg (116 lb 8 oz)     Vital Signs with Ranges  Temp:  [98.1  F (36.7  C)] 98.1  F (36.7  C)  Pulse:  [51-81] 51  Resp:  [16] 16  BP: ()/(57-65) 116/63  SpO2:  [95 %-100 %] 100 %  I/O last 3 completed shifts:  In: -    Out: 200 [Urine:200]      Constitutional:Awake, alert, no apparent distress  Respiratory:  Normal work of breathing. Lungs clear to auscultation bilaterally, no crackles or wheezing.  Cardiovascular: Regular rate and rhythm, normal S1 and S2, and no murmur appreciated.   GI: Normal bowel sounds, soft, non-distended, non-tender.   Skin/Integument: No rashes, no cyanosis, no peripheral edema.  Neuro: Alert.  Oriented to self.  Follows commands.  Intermittently answers questions. Moving all extremities with normal strength. Coordination and sensation grossly intact. Speech clear. No focal deficits.   Psych: Cooperative.  Bedside nursing report the patient is experiencing visual hallucinations.  Cannot provide why she is in the hospital.        Medications       citalopram  20 mg Oral Daily     melatonin  3 mg Oral At Bedtime     memantine  5 mg Oral QAM AC     multivitamin w/minerals  1 tablet Oral Daily     OLANZapine  10 mg Intramuscular Once     OLANZapine zydis  15 mg Oral BID     OLANZapine zydis  15 mg Oral At Bedtime       Data   Recent Labs   Lab 02/01/22  1133 01/31/22  0609 01/30/22  0530 01/29/22  1651 01/29/22  1036   WBC  --   --  6.3  --  5.6   HGB  --   --  12.9  --  14.5   MCV  --   --  92  --  93   PLT  --   --  162  --  184   NA  --   --  144  --  141   POTASSIUM 3.8 3.4 3.5   < > 3.0*   CHLORIDE  --   --  114*  --  105   CO2  --   --  27  --  25   BUN  --   --  14  --  22   CR  --   --  0.70  --  0.75   ANIONGAP  --   --  3  --  11   MARCELA  --   --  8.4*  --  9.4   GLC  --   --  123*  --  89   ALBUMIN  --   --  2.9*  --   --    PROTTOTAL  --   --  5.3*  --   --    BILITOTAL  --   --  0.4  --   --    ALKPHOS  --   --  57  --   --    ALT  --   --  23  --   --    AST  --   --  16  --   --     < > = values in this interval not displayed.       No results found for this or any previous visit (from the past 24 hour(s)).

## 2022-02-04 NOTE — PLAN OF CARE
Oriented to self. Pt more agitated this evening, hard to redirect at times. Sitter at bedside. Pt ambulating in hallway with staff and trying to go into other patient rooms, trying to lock herself in the bathroom, pt also hallucinating- for example, while pt folding towels pt treating the towel like it was her baby. Pt insists she lock the bathroom door when she goes to the bathroom pt also insists she goes to the bathroom on her own. Although understandable but for pt safety, sitter allowed as much privacy as possible without closing the bathroom door and pt kicked the sitters foot. Writer crushed medications and put them in ice cream, pill  in pt room now. Plan- SW following for [facility] placement or possible return to pts memory care unit, psych and palliative also following.

## 2022-02-04 NOTE — PLAN OF CARE
"1900 - 2300  /65 (BP Location: Right arm)   Pulse 81   Temp 97.7  F (36.5  C) (Oral)   Resp 16   Ht 1.549 m (5' 1\")   Wt 52.8 kg (116 lb 8 oz)   SpO2 97%   BMI 22.01 kg/m    VSS on RA. Alert to self, PSC at bedside. Ambulating independently in room and SBA in parkinson. Tolerating regular diet. No IV access, MD aware. PSC at bedside. Pt has been sleeping since 1900 and will not wake up for assessment or medications. Pt shows nonverbal responses to writer attempting to wake patient but will not open her eyes, leading the writer to believe this is behavioral and not R/T unresponsiveness due to medications. Plan to consult Psych, Palliative, and SW, while administering electrolyte replacements and medications as ordered (see MAR), until discharge to [facility]. Pt is resting peacefully. Will continue to provide supportive cares.  "

## 2022-02-04 NOTE — PROGRESS NOTES
"Care Management Follow Up    Expected Discharge Date: 02/04/2022     Concerns to be Addressed: Discharge planning     Patient plan of care discussed at interdisciplinary rounds: Yes    Anticipated Discharge Disposition: [facility] vs Memory Care      Anticipated Discharge Services: None  Anticipated Discharge DME: None    Patient/family educated on Medicare website which has current facility and service quality ratings: Yes  Education Provided on the Discharge Plan:  Yes  Patient/Family in Agreement with the Plan: Yes    Referrals Placed by CM/SW:  [facility]  Private pay costs discussed: Not applicable    Additional Information:  SW met with patient's daughter Miriam, son Sreedhar vs video chat, Palliative care and provider to discuss discharge planning. Answered questions patient had for SW and [facility]. Met for >45 minutes.     After family meeting, COVID test, labs, UA, and updated Psych note were completed. Faxed to [facility] in [place] (F): [phone] per their request for yesterday.     CATHERINE placed follow up call to [facility] in [place], [phone]. Spoke with Haydee who answered the phone. CATHERINE requested to speak with JO-ANN Stallings but was informed that she was not in today. CATHERINE reported she was following up with patient referral and informed them that the information that they requested yesterday had been faxed. Haydee stated that she was told that their provider declined patient and that \"they called someone at the hospital to tell them last night\". CATHERINE sought additional information because CATHERINE had no missed calls and Charge RN was not aware of any calls to the unit. Haydee stated that they had left a VM on the nurses line which is not true. Per staff Haydee at [facility], their provider declined d/t acuity. CATHERINE asked to speak with provider and/or inquired if our provider could do a peer to peer discussion. CATHERINE was placed on hold and Shauna was put on the phone who is an RN. She stated that patient is " declined and she has no further information. She would not allow SW to speak with provider and stated that they have no documentation or record of who she called at [facility] to inform that patient was declined. CATHERINE expressed frustration d/t facility requesting updated labs, orders, etc. yesterday but then supposedly declining patient before the results were completed. Shauna stated she was ending the phone call and hung up.     CATHERINE placed call to [facility] Psych intake, [phone]. They are at capacity within the [facility] system and requested SW call back at 10am tomorrow.     CATHERINE will continue to follow.      1620: CATHERINE met with patient's daughter to provide update. She plans to talk to DON at  this weekend to discuss possible return if she continues to improve. Provided CATHERINE business card with contact info. RN asked that SW update pt's mom. Spoke with daughter who will update patient's mom.     RUBINA King

## 2022-02-04 NOTE — CONSULTS
Psychiatry     Follow Up Consultation  Patient seen and case discussed with nursing and . Patient initially seen January 30th and has been slowly improving to close to baseline. She is up out of bed, still has occasional agitation and some inconsistency of taking PO and meds. No untoward side efffects. [facility]. She has sitter currently mostly for comfort and wandering off an unlocked unit. She is easily redirected, did take some apple juice for me and has mostly accepted cares.   VSS Temp 98.1, Pulse 51, /63, RR 15  MSE Patient is a well nourished appearing brunette 62 year old in a street shirt and pajama bottoms and sneakers. Ambulatory, some pacing in room. She had no emotioinal response to picture of family. She is disoriented except to her name. Recognizes name of her two kids. No evidence of halllucinations. She cannot give me her birthdate or age. Insight and Judgement impaired. She cannot give consent. No understanding of current situation. No distress. Flat affect. Gait was steady. No significant tremor. She did comply with one step direction to sit and sig juice. 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 Continue current psychotropics and compliance has been problematic. She appears to be approaching baseline. Return to memory care when able. Call prn.   Maria Dolores Zheng MD  2/4/2022

## 2022-02-04 NOTE — PROGRESS NOTES
VSS. A&Ox1. Oriented to self. Less agitated in the morning compared to yesterday. Up ind. Sitter at bedside. Looking through photo albums seemed to help calm her.  Pt ambulated in parkinson today. PIV removed. No IV access    Pt has been hallucinating a lot more this afternoon. Seeing people in room who are not there & talking to them.     Oral meds taken in Jello. Would recommend crushing oral medications and putting them in apple sauce or Jello     SW looking for placement.

## 2022-02-04 NOTE — PROGRESS NOTES
[facility]  Palliative Care Progress Note  Text Page     Assessment & Plan    Cristel Turk is a 62 year old female who was admitted on 1/29/2022.   Consulted by Ameya MEZA to assist with symptom management, goals of care, and development of plan of care.    Recommendations:  1. Goals of Care- Full Code  Hospitalization goals discussed discussed overall goals with daughter.  She will discuss with brother further and contact later today with changes in code status  who reports that she would like to figure out what the plans are with her mom before talking about further goals. Family care conference set for tomorrow 2/4/22 1 pm     Decisional Capacity- Unreliable, There is an advance directive in the EMR dated 11/20/2020. Co primary agents are daughter Miriam Taveras and son Sreedhar Ramos     POLST will need to complete discharge     2. Delirium, psychosis: mimimal improvement   -Haloperidol 2 mg IV or tablet(s) twice daily  -Lorazepam 0.5 mg bid  -olanzapine ODT 5 mg tid   -appreciate input from psychiatry Dr. Rivesr and Joni  Anticipate [facility] admission when bed available   -sitter   3. Malnutrition  -small frequent meal, high protein snacks,   -Appreciate the input of dietician for on going recommendations       4. Spiritual Care  Oriented to Spiritual Health as part of Palliative Care team. Spiritual Health Services declined at this time.  Spiritual Background: Taoism      5. Care Planning  Appreciate Care of Mary Eid, for discharge planning as able.     Interval visit:    Daughter in room brought lunch, eating together. Patient more approachable.    Sitter still needed.     Medical Decision Making and Goals of Care  Discussed on February 4, 2022 with EAGLE TomlinsonC,APRN,CNP,ACHPN: met with daughter Miriam Taveras in person and son Sreedhar Ramos by phone from 1:10- 2:10 with social work Mary Eid and medical provider Ameya Haas CNP. Ameya discussed medical  "status and updates outlined potential discharge plans ( [facility] due to behavioral component, mood stabilization, [facility] may not provide this and home safety with potential elopement or self harm and harm to others a concern.  Transfer to [facility] pending approval.   Family in favor of [facility].    Work up complete and per medical team disease progression of dementia likely cause of cognitive/behavioral decline. Ameya and this writer reviewed natural trajectory, prognosis of dementia.   This writer commented on goals of care and discussed code status and why consideration at some point to transition to no CPR on intubation should be considered. Reviewed risk of greater harm, less benefit in QoL improvement. Patient would have increase pain and greater weakness following such aggressive care.  They both expressed appreciation and for now will not make changes to code status.     Discussed on February 1, 2022 with Kristi YEE CNP: Met with patient in her room, she is unable to engage in conversation. She answers \"I don't know\" to all questions including are you in pain. Phone call placed to patients' daughter Miriam. Discussed and reviewed my roll in the team. Discussed her understanding of the diagnosis and what to expect. Discussed overall goals Miriam reports that they have a  Directive however her mom was unable to list as many \"care specific\" goals in her directive. She stated that she understands that she may need to change the plan in the future but she needs to figure out what psych thinks is appropriate before talking about further goals of care. Asked about a directive and she stated she will e-mail to me. She was gien my number should questions arise. Discussed that we will follow up in the future while her mom is still here. Questions asked and answered    Keli YEE CNP  Pain Management and Palliative Care  [facility] " [facility]  Pgr: [phone]      Time Spent on this Encounter   Total unit/floor time 75 minutes, time consisted of the following, examination of the patient, reviewing the record and completing documentation. >50% of time spent in counseling and coordination of care, Bedside Nurse Lolly Cross RN  and Hospitalist Ameya Haas CNP .  JADON KingW Time spend counseling with patient and family consisted of the following topics, goals of care, care planning for discharge and symptom management.      Total Visit Time: 75  o For Outpatient, 'Total Visit Time' = Face-to-Face time only.  o For Inpatient, 'Total Visit Time' = Unit Floor + Face-to-Face time.    Total Face-to-Face Prolonged Service Time: 60     Content of the Prolonged Time:  Goals of care, prognosis, discharge planning       Review of Systems    CONSTITUTIONAL: NEGATIVE for fever, chills, change in weight  ENT/MOUTH: NEGATIVE for ear, mouth and throat problems  RESP: NEGATIVE for significant cough or SOB  CV: NEGATIVE for chest pain, palpitations or peripheral edema    Palliative Symptom Review (0=no symptom/no concern, 1=mild, 2=moderate, 3=severe):      Pain: 0-none      Fatigue: 0-none      Nausea: 0-none      Constipation: 0-none, BM 1/31      Diarrhea: 0-none      Depressive Symptoms: 0-none      Anxiety: 1-mild      Drowsiness: 0-none      Poor Appetite: 1-mild  75%       Shortness of Breath: 0-none      Insomnia: 2-moderate      Other:delirium  3-severe      Overall (0 good/no concerns, 3 very poor):  2    Physical Exam   Temp:  [98.1  F (36.7  C)] 98.1  F (36.7  C)  Pulse:  [51-75] 51  Resp:  [16] 16  BP: ()/(57-63) 116/63  SpO2:  [95 %-100 %] 100 %  116 lbs 8 oz  Exam:  GEN:  Alert, oriented x 1, appears comfortable, NAD. Poor speech output , non sensical   HEENT:  Normocephalic/atraumatic, no scleral icterus, no nasal discharge, mouth moist.  CV:.  +3 DP/PT pulses bilatererally; no edema BLE.  RESP:  .  Symmetric chest rise on  inhalation noted.  Normal respiratory effort.  EXT:  Edema & pulses as noted above.  CMS intact x 4.     M/S:   SEGOVIA x 4     SKIN:  Dry to touch, no exanthems noted in the visualized areas.    NEURO: Symmetric movement   Psych:  Less agitated, and less paranoid  affect.  conversant inappropriately.    Medications       citalopram  20 mg Oral Daily     melatonin  3 mg Oral At Bedtime     memantine  5 mg Oral QAM AC     multivitamin w/minerals  1 tablet Oral Daily     OLANZapine zydis  15 mg Oral BID       Data   Results for orders placed or performed during the hospital encounter of 01/29/22 (from the past 24 hour(s))   Psychiatry IP Consult: follow up eval, antipsychotic management for paranoia; Consultant may enter orders: Yes; Patient to be seen: Routine; Call back #: [phone]; Requesting provider? Hospitalist (if different from attending physician); Name:...    Maria Dolores Spencer MD     2/4/2022  1:30 PM  Psychiatry     Follow Up Consultation  Patient seen and case discussed with nursing and .   Patient initially seen January 30th and has been slowly improving   to close to baseline. She is up out of bed, still has occasional   agitation and some inconsistency of taking PO and meds. No   untoward side efffects. She is being considered for transfer to   [facility] for interim stabilization. She has sitter   currently mostly for comfort and wandering off an unlocked unit.   She is easily redirected, did take some apple juice for me and   has mostly accepted cares.   VSS Temp 98.1, Pulse 51, /63, RR 15  MSE Patient is a well nourished appearing brunette 62 year old in   a street shirt and pajama bottoms and sneakers. Ambulatory, some   pacing in room. She had no emotioinal response to picture of   family. She is disoriented except to her name. Recognizes name of   her two kids. No evidence of halllucinations. She cannot give me   her birthdate or age. Insight and  Judgement impaired. She cannot   give consent. No understanding of current situation. No distress.   Flat affect. Gait was steady. No significant tremor. She did   comply with one step direction to sit and sig juice. 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 Continue current psychotropics and compliance has   been problematic. She appears to be approaching baseline. Return   to memory care when able. Call prn.   Maria Dolores Zheng MD  2/4/2022       UA with Microscopic   Result Value Ref Range    Color Urine Yellow Colorless, Straw, Light Yellow, Yellow    Appearance Urine Clear Clear    Glucose Urine Negative Negative mg/dL    Bilirubin Urine Negative Negative    Ketones Urine Negative Negative mg/dL    Specific Gravity Urine 1.023 1.003 - 1.035    Blood Urine Negative Negative    pH Urine 5.5 5.0 - 7.0    Protein Albumin Urine 10  (A) Negative mg/dL    Urobilinogen Urine Normal Normal, 2.0 mg/dL    Nitrite Urine Negative Negative    Leukocyte Esterase Urine Small (A) Negative    Bacteria Urine Few (A) None Seen /HPF    Mucus Urine Present (A) None Seen /LPF    RBC Urine 5 (H) <=2 /HPF    WBC Urine 5 <=5 /HPF    Squamous Epithelials Urine 1 <=1 /HPF    Hyaline Casts Urine 4 (H) <=2 /LPF   Asymptomatic COVID-19 Virus (Coronavirus) by PCR Nasopharyngeal    Specimen: Nasopharyngeal; Swab   Result Value Ref Range    SARS CoV2 PCR Negative Negative    Narrative    Testing was performed using the damián  SARS-CoV-2 & Influenza A/B Assay on the damián  [employer]ar[employer]  System.  This test should be ordered for the detection of SARS-COV-2 in individuals who meet SARS-CoV-2 clinical and/or epidemiological criteria. Test performance is unknown in asymptomatic patients.  This test is for in vitro diagnostic use under the FDA EUA for laboratories certified under CLIA to perform moderate and/or  high complexity testing. This test has not been FDA cleared or approved.  A negative test does not rule out the presence of PCR inhibitors in the specimen or target RNA in concentration below the limit of detection for the assay. The possibility of a false negative should be considered if the patient's recent exposure or clinical presentation suggests COVID-19.  [facility] are certified under the Clinical Laboratory Improvement Amendments of 1988 (CLIA-88) as qualified to perform moderate and/or high complexity laboratory testing.   Basic metabolic panel   Result Value Ref Range    Sodium 141 133 - 144 mmol/L    Potassium 4.6 3.4 - 5.3 mmol/L    Chloride 110 (H) 94 - 109 mmol/L    Carbon Dioxide (CO2) 27 20 - 32 mmol/L    Anion Gap 4 3 - 14 mmol/L    Urea Nitrogen 13 7 - 30 mg/dL    Creatinine 0.67 0.52 - 1.04 mg/dL    Calcium 8.8 8.5 - 10.1 mg/dL    Glucose 89 70 - 99 mg/dL    GFR Estimate >90 >60 mL/min/1.73m2   CBC with platelets   Result Value Ref Range    WBC Count 6.4 4.0 - 11.0 10e3/uL    RBC Count 4.30 3.80 - 5.20 10e6/uL    Hemoglobin 13.5 11.7 - 15.7 g/dL    Hematocrit 40.8 35.0 - 47.0 %    MCV 95 78 - 100 fL    MCH 31.4 26.5 - 33.0 pg    MCHC 33.1 31.5 - 36.5 g/dL    RDW 13.0 10.0 - 15.0 %    Platelet Count 141 (L) 150 - 450 10e3/uL   Extra Tube    Narrative    The following orders were created for panel order Extra Tube.  Procedure                               Abnormality         Status                     ---------                               -----------         ------                     Extra Red Top Tube[[id number]]                               Final result                 Please view results for these tests on the individual orders.   Extra Red Top Tube   Result Value Ref Range    Hold Specimen JI

## 2022-02-04 NOTE — PLAN OF CARE
"  INPATIENT NOTE: [id number]    PRIMARY PROBLEM:      BP 95/57 (BP Location: Right arm)   Pulse 75   Temp 98.1  F (36.7  C) (Oral)   Resp 16   Ht 1.549 m (5' 1\")   Wt 52.8 kg (116 lb 8 oz)   SpO2 95%   BMI 22.01 kg/m         Orientation: Alert to self, Mostly sleeping during shift.   Neuro: Intact   Pain status: No nonverbal indicators of pain present. Calm, comfortable and appears to be sleeping.   Resp: Lung sounds are Clear. Denies any SOB.   Cardiac: WNL, Denies any Chest Pain   GI: Bowels are active x 4 Quads.   : Voiding with no concern    LDA: Peripheral IV   Infusions: Patient has no IV access.   Diet: Regular  Activity: Indpt in room and SBA with no assistive devices in the hallway.   Alarms/Safety: Sitter at bedside,   Consults: Palliative, Social Work or Care Coordination and Psych   Discharge Plan: [facility]    Other Cares/Comments: Patient refused Labs this morning. Educated for needs for lab collection but patient absolutely refused.     Will continue to monitor and provide cares.     Hope Eli RN        "

## 2022-02-05 PROCEDURE — 120N000004 HC R&B MS OVERFLOW

## 2022-02-05 PROCEDURE — 99232 SBSQ HOSP IP/OBS MODERATE 35: CPT | Performed by: NURSE PRACTITIONER

## 2022-02-05 PROCEDURE — 250N000013 HC RX MED GY IP 250 OP 250 PS 637: Performed by: PHYSICIAN ASSISTANT

## 2022-02-05 PROCEDURE — 99207 PR CDG-MDM COMPONENT: MEETS MODERATE - UP CODED: CPT | Performed by: NURSE PRACTITIONER

## 2022-02-05 PROCEDURE — 250N000013 HC RX MED GY IP 250 OP 250 PS 637: Performed by: INTERNAL MEDICINE

## 2022-02-05 RX ADMIN — OLANZAPINE 15 MG: 5 TABLET, ORALLY DISINTEGRATING ORAL at 16:51

## 2022-02-05 RX ADMIN — Medication 3 MG: at 21:46

## 2022-02-05 ASSESSMENT — ACTIVITIES OF DAILY LIVING (ADL)
ADLS_ACUITY_SCORE: 6

## 2022-02-05 NOTE — PROGRESS NOTES
[facility]  Hospitalist Progress Note    Date of Service: 2/5/2022    #8    24 hour events:  No acute changes.  Intermittently refusing medication but behaviorally appropriate. No aggressive or inappropriate behaviors.  Repeat labs obtained yesterday and no evidence of infection.  Family conference held yesterday.  Original plan for [facility].  However, currently [facility] is unavailable.  She has been declined by [facility] for unclear reasons. Attempts to obtain rationale and clarification has been unsuccessful.      Assessment & Plan   Cristel Turk is a 62 year old lady with past medical history of depression, Alzheimer's dementia lives in an memory care unit came to [facility] on 1/29/2022 with behavior changes described as increased paranoia and confusion.     Dementia with Acute behavior disturbances  Psychosis, Paranoia   Presented with several weeks of reports of increased confusion,  paranoia with agitation.  History of early onset Alzheimer's dementia. Baseline is advanced memory loss.  She remains with visual hallucinations and paranoia.  No agitation or combativeness.  No evidence of urinary tract infection or acute illnesses identified precipitating behavior disturbance. ]See neg Ucx (1/29)    Prior to this admission was taking Celexa, donepezil and Namenda.  She was prescribed trazodone prior to admission but had not yet started.     -Psychiatry has been consulted in her care, Dr. Zheng saw on 1/30, Dr. Quinones on 2/1 and 2/4/2022.  -she remains admitted for further cares and would be suitable for inpatient psychiatric care if unable to return facility.  If unable to place in [facility] will need to look at returning to memory care. SW is involved.  Appreciate excellent cares by all.     Medication management this admission:  ->1/30 Seroquel, refusing PO, given IM Zyprexa, IV ativan -> somewhat improved with zyprexa but not reliabling taking PO remained with  paranoid psychosis  ->2/1 started on ativan/iv haldol per Dr. Quinones, getting scheduled IV to avoid IM, Zyprexa TID (QTC WNL)  Haldol 2 mg IV twice daily.  Ativan 0.5 mg IV twice daily  Olnazepam 5 mg ODT 3 times daily  Continue Namenda 5 mg and Aricept 10 mg daily  Continue melatonin 3 mg at bedtime  Continue citalopram 20 mg daily  -> 2/2 lost IV access, replaced IV given lack of PO med compliance -> still requiring bedside attendant  -> 2/3 -> Intake coord. With Psychiatry (Haylie) Indicating would not accept patient if still requiring haldol IV rx, last given 2/2 11:20 was haldol + ativan.   Pt remains resistant to PO medications at times, cooperative with encouragement from nursing. Paranoia increased with ativan. Appears much better off Ativan.  Discussed with Dr. Quinones recommendations to avoid IV medications who advised increased dose of Zyprexa (15 mg PO BID) with IM Zyprexa if needed. For now will increase Zyprexa to 15 mg BID, however this is maxing recc. Dose of Zyprexa daily so would not recommend additional zyprexa if need additional pharmaceutical intervention will continue Haldol prn.  Donepezil discontinued.   2/4 reconsult Dr. Zheng completed.  Continue citalopram, melatonin, memantine, and olanzapine.     -- Repeat labs as above. Does not appear to have an infectious cause.  -- Continue to show improvement and behavior has been appropriate.  Disposition remains unknown -- group home/memory care vs [facility].       Hypokalemia  Replaced per protocol.      Acute hypophosphatemia: replaced. Improved.        DVT Prophylaxis: Ambulate every shift  Code Status: Full Code  Expected Discharge:      Medically ready once acceptance obtained.  If unable to place next several days (weekdays), will need to consider returning to group home/memory care.  Unable to place with family and has been denied by [facility] for unclear reasons and for which rationale is not been able to be provided.  She has NOT been  aggressive or behaviorally out of control.      JOSELITO Michel CNP      Interval History   No acute events overnight.   Denies any new symptoms.  Ms. Turk was seen and examined. She remains with a 1:1 due to impulsivity (not inappropriate) secondary to dementia.  She would do well in a locked or monitored environment.  This provider has extensive background in the medical comanagement of psychiatric patients, and it is my opinion that she would best be served and would do well in a geriatric psychiatric unit, so medication stabilization can be obtained.  Anticipate post [facility] psych placement would be able to return to group home/memory care unit.  If unable to achieve, may have to consider long term nursing home placement.   While she continues to refuse medications at times, she is scared and anxious. She does well with small group encounters and being provided concrete choices.  She has been redirectable.  Unfortunately, geriatric psych beds are limited and have been at capacity.  We were tentatively accepted at [facility] but was notified late 2/4/2022 that this was no longer an option.  We have provided and met each request.  Family has been engaged and appropriate.        -Data reviewed today: I reviewed all new labs and imaging results over the last 24 hours.     Physical Exam                      Vitals:    01/29/22 1145 01/29/22 1556   Weight: 51.9 kg (114 lb 6.7 oz) 52.8 kg (116 lb 8 oz)     Vital Signs with Ranges     No intake/output data recorded.      Constitutional:Awake, alert, no apparent distress  Respiratory:  Normal work of breathing. Lungs clear to auscultation bilaterally, no crackles or wheezing.  Cardiovascular: Regular rate and rhythm, normal S1 and S2, and no murmur appreciated.   GI: Normal bowel sounds, soft, non-distended, non-tender.   Skin/Integument: No rashes, no cyanosis, no peripheral edema.  Neuro: Alert.  Oriented to self, states year is 2022. She can tell me  "that the PARADISE is a \"new katherine.\"  Follows commands.  Intermittently answers questions. Moving all extremities with normal strength. Coordination and sensation grossly intact. Speech clear. No focal deficits.   Psych: Cooperative.  No SI or HI. No aggressive or inappropriate behaviors.         Medications       citalopram  20 mg Oral Daily     melatonin  3 mg Oral At Bedtime     memantine  5 mg Oral QAM AC     multivitamin w/minerals  1 tablet Oral Daily     OLANZapine zydis  15 mg Oral BID       Data   Recent Labs   Lab 02/04/22  1250 02/01/22  1133 01/31/22  0609 01/30/22  0530   WBC 6.4  --   --  6.3   HGB 13.5  --   --  12.9   MCV 95  --   --  92   *  --   --  162     --   --  144   POTASSIUM 4.6 3.8 3.4 3.5   CHLORIDE 110*  --   --  114*   CO2 27  --   --  27   BUN 13  --   --  14   CR 0.67  --   --  0.70   ANIONGAP 4  --   --  3   MARCELA 8.8  --   --  8.4*   GLC 89  --   --  123*   ALBUMIN  --   --   --  2.9*   PROTTOTAL  --   --   --  5.3*   BILITOTAL  --   --   --  0.4   ALKPHOS  --   --   --  57   ALT  --   --   --  23   AST  --   --   --  16       No results found for this or any previous visit (from the past 24 hour(s)).    "

## 2022-02-05 NOTE — PROGRESS NOTES
Pt alert to self and time, pt was able to tell that the year was 2022. Pt refusing vital checks along with her AM medication.  Pt states that she has no pain. Pt restless during shift, walking halls with sitter. Pt then took her AM zypreza this afternoon. Ambulating independently in room. Referrals sent to [facility]. Continue to monitor.     Temp: 97.5  F (36.4  C) Temp src: Oral BP: 121/59 Pulse: 85   Resp: 16 SpO2: 97 % O2 Device: None (Room air)

## 2022-02-05 NOTE — PLAN OF CARE
"2300 - 0700  /63 (BP Location: Right arm)   Pulse 51   Temp 98.1  F (36.7  C) (Oral)   Resp 16   Ht 1.549 m (5' 1\")   Wt 52.8 kg (116 lb 8 oz)   SpO2 100%   BMI 22.01 kg/m    Bradycardic on RA. Alert to self, PSC at bedside. Ambulating independently in room and SBA in [place]. Tolerating regular diet. No IV access, MD aware. Pt has been sleeping since 2130 and will not wake up for assessment. Pt shows nonverbal responses to writer attempting to wake patient but will not open her eyes, leading the writer to believe this is behavioral and not R/T unresponsiveness due to medications. Plan to consult Palliative, and SW, while administering electrolyte replacements and medications as ordered (see MAR), until discharge to [facility]. Referrals sent to [facility] (see SW note). Pt is resting peacefully. Will continue to provide supportive cares.  "

## 2022-02-05 NOTE — PROGRESS NOTES
"CLINICAL NUTRITION SERVICES  -  ASSESSMENT NOTE    Recommendations Ordered by Registered Dietitian (RD):   Continue diet as ordered   Ordered Ensure Enlive with meals BID (vanilla)   Ordered new weight    Malnutrition:   % Weight Loss:  > 5% in 1 month (severe malnutrition) --> ordered new weight   % Intake:  Decreased intake does not meet criteria for malnutrition   Subcutaneous Fat Loss:  None observed  Muscle Loss:  Clavicle bone region mild depletion --> will not use given only one indicator   Fluid Retention:  None noted    Malnutrition Diagnosis: Patient does not meet two of the above criteria necessary for diagnosing malnutrition     REASON FOR ASSESSMENT  Cristel Turk is a 62 year old female seen by Registered Dietitian for Admission Nutrition Risk Screen for positive and LOS    NUTRITION HISTORY  - Information obtained from patient and chart, of note pt is alert to self only  - History of  depression and dementia  - Patient admitted for acute paranoia and chronic dementia + UTI  - Unable to obtain nutrition history, see above  - Food Allergies: NKFA    CURRENT NUTRITION ORDERS  Diet Order:     Regular Diet     Current Intake/Tolerance:  Upon review of the flowsheets, pt is consuming % of meals ordered. Ordering 2-3 meals per day. Pt's sitter notes that the patient typically only consumes desserts.     NUTRITION FOCUSED PHYSICAL ASSESSMENT FOR DIAGNOSING MALNUTRITION)  Yes          Observed:    Muscle wasting (refer to documentation in Malnutrition section)    Obtained from Chart/Interdisciplinary Team:  - psych following   - palliative following   - awaiting placement to [facility] bed     ANTHROPOMETRICS  Height: 5' 1\"  Weight: 52.8 kg ( 116 lbs 8 oz)   Body mass index is 22.01 kg/m .  Weight Status:  Normal BMI  Weight History: Limited weight history to comment on, possible weight loss of 3.9 kg (7%) over the past ~ 2.5 weeks. Ordered new weight to confirm   Care everywhere:   56.7 kg (125 lb) " 01/11/2022 3:25 PM CST       Wt Readings from Last 10 Encounters:   01/29/22 52.8 kg (116 lb 8 oz)   02/26/20 55.2 kg (121 lb 12.8 oz)   11/19/19 57.2 kg (126 lb)   07/09/18 56.7 kg (125 lb)   04/02/14 56.7 kg (125 lb)     LABS  Labs reviewed  Labs:  Electrolytes  Potassium (mmol/L)   Date Value   02/04/2022 4.6   02/01/2022 3.8   01/31/2022 3.4   04/02/2014 4.1   03/25/2009 4.1     Phosphorus (mg/dL)   Date Value   02/01/2022 3.0   01/31/2022 3.9   01/29/2022 2.0 (L)    Blood Glucose  Glucose (mg/dL)   Date Value   02/04/2022 89   01/30/2022 123 (H)   01/29/2022 89   08/18/2021 71   04/02/2014 99   03/25/2009 77    Inflammatory Markers  WBC (10e9/L)   Date Value   04/02/2014 5.9     WBC Count (10e3/uL)   Date Value   02/04/2022 6.4   01/30/2022 6.3   01/29/2022 5.6     Albumin (g/dL)   Date Value   01/30/2022 2.9 (L)   08/18/2021 4.0   04/02/2014 4.0      Magnesium (mg/dL)   Date Value   01/31/2022 2.1   01/30/2022 2.1   01/29/2022 2.2     Sodium (mmol/L)   Date Value   02/04/2022 141   01/30/2022 144   01/29/2022 141   04/02/2014 142   03/25/2009 141    Renal  Urea Nitrogen (mg/dL)   Date Value   02/04/2022 13   01/30/2022 14   01/29/2022 22   04/02/2014 17   03/25/2009 15     Creatinine (mg/dL)   Date Value   02/04/2022 0.67   01/30/2022 0.70   01/29/2022 0.75   04/02/2014 0.81   03/25/2009 1.09 (H)     Additional  Triglycerides (mg/dL)   Date Value   08/18/2021 145     Ketones Urine (mg/dL)   Date Value   02/04/2022 Negative        MEDICATIONS  Medications reviewed    citalopram  20 mg Oral Daily     melatonin  3 mg Oral At Bedtime     memantine  5 mg Oral QAM AC     multivitamin w/minerals  1 tablet Oral Daily     OLANZapine zydis  15 mg Oral BID         acetaminophen **OR** acetaminophen, haloperidol lactate, ibuprofen, magnesium hydroxide, ondansetron **OR** ondansetron, prochlorperazine **OR** prochlorperazine **OR** prochlorperazine, senna-docusate **OR** senna-docusate     ASSESSED NUTRITION NEEDS PER  APPROVED PRACTICE GUIDELINES:    Dosing Weight 52.8 kg   Estimated Energy Needs: 0821-8354 kcals (25-30 Kcal/Kg)  Justification: maintenance  Estimated Protein Needs: 63-79 grams protein (1.2-1.5 g pro/Kg)  Justification: preservation of lean body mass  Estimated Fluid Needs: per MD     MALNUTRITION:  % Weight Loss:  > 5% in 1 month (severe malnutrition) --> ordered new weight   % Intake:  Decreased intake does not meet criteria for malnutrition   Subcutaneous Fat Loss:  None observed  Muscle Loss:  Clavicle bone region mild depletion --> will not use given only one indicator   Fluid Retention:  None noted    Malnutrition Diagnosis: Patient does not meet two of the above criteria necessary for diagnosing malnutrition    NUTRITION DIAGNOSIS:  Predicted inadequate nutrient intake (protein/energy) related to potential for PO intake to decline pending clinical course      NUTRITION INTERVENTIONS  Recommendations / Nutrition Prescription  Continue diet as ordered   Ordered Ensure Enlive with meals BID (vanilla)   Ordered new weight     Implementation  Nutrition education: Not appropriate at this time due to patient condition  Medical Food Supplement: as above    Nutrition Goals  Pt to consume 50-75% of meals or oral nutritional supplements ordered TID     MONITORING AND EVALUATION:  Progress towards goals will be monitored and evaluated per protocol and Practice Guidelines    Sindhu Esparza, RD, LD  Clinical Dietitian

## 2022-02-06 LAB
MAGNESIUM SERPL-MCNC: 2.1 MG/DL (ref 1.8–2.6)
PHOSPHATE SERPL-MCNC: 3.3 MG/DL (ref 2.5–4.5)
POTASSIUM BLD-SCNC: 3.7 MMOL/L (ref 3.4–5.3)

## 2022-02-06 PROCEDURE — 250N000013 HC RX MED GY IP 250 OP 250 PS 637: Performed by: INTERNAL MEDICINE

## 2022-02-06 PROCEDURE — 36415 COLL VENOUS BLD VENIPUNCTURE: CPT | Performed by: INTERNAL MEDICINE

## 2022-02-06 PROCEDURE — 250N000013 HC RX MED GY IP 250 OP 250 PS 637: Performed by: PHYSICIAN ASSISTANT

## 2022-02-06 PROCEDURE — 83735 ASSAY OF MAGNESIUM: CPT | Performed by: INTERNAL MEDICINE

## 2022-02-06 PROCEDURE — 99233 SBSQ HOSP IP/OBS HIGH 50: CPT | Performed by: NURSE PRACTITIONER

## 2022-02-06 PROCEDURE — 99207 PR CDG-MDM COMPONENT: MEETS LOW - DOWN CODED: CPT | Performed by: NURSE PRACTITIONER

## 2022-02-06 PROCEDURE — 120N000004 HC R&B MS OVERFLOW

## 2022-02-06 PROCEDURE — 84132 ASSAY OF SERUM POTASSIUM: CPT | Performed by: INTERNAL MEDICINE

## 2022-02-06 PROCEDURE — 84100 ASSAY OF PHOSPHORUS: CPT | Performed by: INTERNAL MEDICINE

## 2022-02-06 RX ADMIN — OLANZAPINE 15 MG: 5 TABLET, ORALLY DISINTEGRATING ORAL at 11:23

## 2022-02-06 RX ADMIN — MEMANTINE 5 MG: 5 TABLET ORAL at 11:23

## 2022-02-06 RX ADMIN — OLANZAPINE 15 MG: 5 TABLET, ORALLY DISINTEGRATING ORAL at 21:45

## 2022-02-06 RX ADMIN — CITALOPRAM HYDROBROMIDE 20 MG: 20 TABLET ORAL at 11:23

## 2022-02-06 RX ADMIN — Medication 3 MG: at 21:45

## 2022-02-06 ASSESSMENT — ACTIVITIES OF DAILY LIVING (ADL)
ADLS_ACUITY_SCORE: 6

## 2022-02-06 ASSESSMENT — MIFFLIN-ST. JEOR: SCORE: 1003.57

## 2022-02-06 NOTE — PLAN OF CARE
Patient is Disorientated to, Time, Place and Situation. Ambulates independently.  Pt is a Regular diet.  Denying pain.  Patient has no IV access. Refused vitals. Restless and anxious at beginning of shift, walking around [place]. Slept remainder of night. Per Pharmacist, did not give Zyprexa in the evening due to previous dose given at 1651. Referrals sent to [facility]. Continue to monitor.

## 2022-02-06 NOTE — PROGRESS NOTES
[facility]  Hospitalist Progress Note    Date of Service: 2/6/2022   HD #9    24 hour events:  No acute changes. No behavioral issues.     Assessment & Plan   Cristel Turk is a 62 year old lady with past medical history of depression, Alzheimer's dementia lives in an memory care unit came to [facility] on 1/29/2022 with behavior changes described as increased paranoia and confusion.     Dementia with Acute behavior disturbances  Psychosis, Paranoia   Presented with several weeks of reports of increased confusion,  paranoia with agitation.  History of early onset Alzheimer's dementia. Baseline is advanced memory loss.  She remains with visual hallucinations and paranoia.  No agitation or combativeness.  No evidence of urinary tract infection or acute illnesses identified precipitating behavior disturbance. ]See neg Ucx (1/29)    Prior to this admission was taking Celexa, donepezil and Namenda.  She was prescribed trazodone prior to admission but had not yet started.     -Psychiatry has been consulted in her care, Dr. Zheng saw on 1/30, Dr. Quinones on 2/1 and 2/4/2022.  -she remains admitted for further cares and would be suitable for inpatient psychiatric care if unable to return facility.  If unable to place in [facility] will need to look at returning to memory care. SW is involved.  Appreciate excellent cares by all.     Medication management this admission:  ->1/30 Seroquel, refusing PO, given IM Zyprexa, IV ativan -> somewhat improved with zyprexa but not reliabling taking PO remained with paranoid psychosis  ->2/1 started on ativan/iv haldol per Dr. Quinones, getting scheduled IV to avoid IM, Zyprexa TID (QTC WNL)  Haldol 2 mg IV twice daily.  Ativan 0.5 mg IV twice daily  Olnazepam 5 mg ODT 3 times daily  Continue Namenda 5 mg and Aricept 10 mg daily  Continue melatonin 3 mg at bedtime  Continue citalopram 20 mg daily  -> 2/2 lost IV access, replaced IV given lack of PO med  compliance -> still requiring bedside attendant  -> 2/3 -> Intake coord. With Psychiatry (Haylie) Indicating would not accept patient if still requiring haldol IV rx, last given 2/2 11:20 was haldol + ativan.   Pt remains resistant to PO medications at times, cooperative with encouragement from nursing. Paranoia increased with ativan. Appears much better off Ativan.  Discussed with Dr. Quinones recommendations to avoid IV medications who advised increased dose of Zyprexa (15 mg PO BID) with IM Zyprexa if needed. For now will increase Zyprexa to 15 mg BID, however this is maxing rec. Dose of Zyprexa daily so would not recommend additional zyprexa if need additional pharmaceutical intervention will continue Haldol prn.  Donepezil discontinued.   2/4 reconsult Dr. Zheng completed.  Continue citalopram, melatonin, memantine, and olanzapine.   2/6 doing better.  No acute issues.  Awaiting placement.  Continue same plan of care for now.     -- Continue to show improvement and behavior has been appropriate.  Disposition remains unknown -- group home/memory care vs [facility].   -- No need for additional medical work up at this time.             DVT Prophylaxis: Ambulate every shift  Code Status: Full Code  Expected Discharge:      Medically ready once acceptance obtained.  If unable to place next several days (weekdays), will need to consider returning to group home/memory care.  Unable to place with family and has been denied by [facility] for unclear reasons and for which rationale is not been able to be provided.  She has NOT been aggressive or behaviorally out of control.      Ameya Haas, JOSELITO CNP      Interval History   No acute events overnight.   Denies any new symptoms.  See my note from 2/5/2022.  Awaiting placement.         -Data reviewed today: I reviewed all new labs and imaging results over the last 24 hours.     Physical Exam   Temp: 97.5  F (36.4  C) Temp src: Oral BP: 117/61 Pulse: 55   Resp: 15 SpO2:  97 % O2 Device: None (Room air)    Vitals:    01/29/22 1145 01/29/22 1556 02/06/22 1032   Weight: 51.9 kg (114 lb 6.7 oz) 52.8 kg (116 lb 8 oz) 51.6 kg (113 lb 12.8 oz)     Vital Signs with Ranges  Temp:  [97.5  F (36.4  C)] 97.5  F (36.4  C)  Pulse:  [55-85] 55  Resp:  [15-16] 15  BP: (117-121)/(59-61) 117/61  SpO2:  [97 %] 97 %  No intake/output data recorded.      Constitutional:Awake, alert, no apparent distress  Respiratory:  Normal work of breathing. Lungs clear to auscultation bilaterally, no crackles or wheezing.  Cardiovascular: Regular rate and rhythm, normal S1 and S2, and no murmur appreciated.   GI: Normal bowel sounds, soft, non-distended, non-tender.   Skin/Integument: No rashes, no cyanosis, no peripheral edema.  Neuro: Alert.  Oriented to self. Follows commands.  Intermittently answers questions. Moving all extremities with normal strength. Coordination and sensation grossly intact. Speech clear. No focal deficits. Has been active in unit, ambulating, engages with staff -- no inappropriate behaviors demonstrated.   Psych: Cooperative.  No SI or HI. No aggressive or inappropriate behaviors.         Medications       citalopram  20 mg Oral Daily     melatonin  3 mg Oral At Bedtime     memantine  5 mg Oral QAM AC     multivitamin w/minerals  1 tablet Oral Daily     OLANZapine zydis  15 mg Oral BID       Data   Recent Labs   Lab 02/06/22  0920 02/04/22  1250 02/01/22  1133   WBC  --  6.4  --    HGB  --  13.5  --    MCV  --  95  --    PLT  --  141*  --    NA  --  141  --    POTASSIUM 3.7 4.6 3.8   CHLORIDE  --  110*  --    CO2  --  27  --    BUN  --  13  --    CR  --  0.67  --    ANIONGAP  --  4  --    MARCELA  --  8.8  --    GLC  --  89  --        No results found for this or any previous visit (from the past 24 hour(s)).

## 2022-02-06 NOTE — PROGRESS NOTES
VSS. Patient calm, stays busy fidgeting in her room and walking in the halls. PSC present. Took pills crushed really well in ice cream. Await placement.

## 2022-02-07 LAB
MAGNESIUM SERPL-MCNC: 2.2 MG/DL (ref 1.8–2.6)
PHOSPHATE SERPL-MCNC: 3.6 MG/DL (ref 2.5–4.5)
POTASSIUM BLD-SCNC: 3.8 MMOL/L (ref 3.4–5.3)

## 2022-02-07 PROCEDURE — 250N000013 HC RX MED GY IP 250 OP 250 PS 637: Performed by: INTERNAL MEDICINE

## 2022-02-07 PROCEDURE — 83735 ASSAY OF MAGNESIUM: CPT | Performed by: PHYSICIAN ASSISTANT

## 2022-02-07 PROCEDURE — 250N000013 HC RX MED GY IP 250 OP 250 PS 637: Performed by: PHYSICIAN ASSISTANT

## 2022-02-07 PROCEDURE — 36415 COLL VENOUS BLD VENIPUNCTURE: CPT | Performed by: NURSE PRACTITIONER

## 2022-02-07 PROCEDURE — 84100 ASSAY OF PHOSPHORUS: CPT | Performed by: NURSE PRACTITIONER

## 2022-02-07 PROCEDURE — 99231 SBSQ HOSP IP/OBS SF/LOW 25: CPT | Performed by: NURSE PRACTITIONER

## 2022-02-07 PROCEDURE — 84132 ASSAY OF SERUM POTASSIUM: CPT | Performed by: NURSE PRACTITIONER

## 2022-02-07 PROCEDURE — 120N000004 HC R&B MS OVERFLOW

## 2022-02-07 RX ADMIN — OLANZAPINE 15 MG: 5 TABLET, ORALLY DISINTEGRATING ORAL at 20:23

## 2022-02-07 RX ADMIN — MEMANTINE 5 MG: 5 TABLET ORAL at 11:41

## 2022-02-07 RX ADMIN — CITALOPRAM HYDROBROMIDE 20 MG: 20 TABLET ORAL at 11:42

## 2022-02-07 RX ADMIN — OLANZAPINE 15 MG: 5 TABLET, ORALLY DISINTEGRATING ORAL at 08:12

## 2022-02-07 RX ADMIN — MULTIPLE VITAMINS W/ MINERALS TAB 1 TABLET: TAB at 11:42

## 2022-02-07 ASSESSMENT — ACTIVITIES OF DAILY LIVING (ADL)
ADLS_ACUITY_SCORE: 6

## 2022-02-07 NOTE — PLAN OF CARE
"1500 - 1900  /52 (BP Location: Left arm)   Pulse 67   Temp 97.5  F (36.4  C) (Oral)   Resp 16   Ht 1.549 m (5' 1\")   Wt 51.6 kg (113 lb 12.8 oz)   SpO2 100%   BMI 21.50 kg/m    VSS on RA. Alert to self, PSC at bedside. Ambulating independently in room and SBA in [place]. Tolerating regular diet and protein shake. No IV access, MD aware. Some minor nonviolent hallucinations today, seeing \"a little girl/babies\", pt able to be redirected. Plan to consult Palliative, and SW, while administering electrolyte replacements and medications as ordered (see MAR), until discharge back to prior living arrangements tentatively tomorrow. Pt is resting peacefully. Will continue to provide supportive cares.  "

## 2022-02-07 NOTE — PLAN OF CARE
Temp: 97.8  F (36.6  C) Temp src: Oral BP: 117/62 Pulse: 72   Resp: 14 SpO2: 98 % O2 Device: None (Room air)       Oriented to self only. Pt up Independently, steady, sitter for pt safety. Per sitter pt hallucinating and talking to people in the room that aren't there. Pt took her evening zyprexa and melatonin crushed in ice cream. No IV access. Plan- sitter for safety, crush meds, vital signs changed to every 8 hours if pt allows, mag/phos/pot replacement protocol, SW following for placement- possible return to pts memory care.

## 2022-02-07 NOTE — PROGRESS NOTES
"Care Management Follow Up    Length of Stay (days): 7    Expected Discharge Date: 02/08/2022     Concerns to be Addressed: discharge planning     Patient plan of care discussed at interdisciplinary rounds: Yes    Anticipated Discharge Disposition: Return to ?      Anticipated Discharge Services: None  Anticipated Discharge DME: None    Patient/family educated on Medicare website which has current facility and service quality ratings: no  Education Provided on the Discharge Plan:  Yes  Patient/Family in Agreement with the Plan: Yes    Referrals Placed by CM/SW:  [facility]  Private pay costs discussed: Not applicable    Additional Information:  CATHERINE received email from daughter Miriam \"I texted briefly with the director/head nurse at my mother's memory care home and told her that most likely my mom would be discharged and returning to [facility] this week. She told me to pass her info on to you and asked that you call her Monday morning. Please let me know what next steps are.\"    CATHERINE placed call to PREET Andrade [phone] at the [facility]. Left voicemail requesting a return call.     CATHERINE will continue to follow.     1400: CATHERINE spoke with Lupe from [facility]. She asked that updated clinical notes and MAR be faxed to her at (f) [phone]. CATHERINE sent RN, MD, Palliative, and Psych notes per their request with daughters permission. Facility noted concerns with pt being seen by [facility] team and having frequent outpt Neurology follow up--they plan to discuss this with daughter. Facility inquired about code status-chart still indicates full code-paged palliative to clarify. Facility would like patient back by late morning/early afternoon on day of discharge. They are aware that she has a 1:1 PSC here d/t not being in a secure unit. Facility requesting that any new medications be sent to [facility] to be filled.  Provided daughter with an update-she will plan to transport at discharge.     1440: Spoke with " provider, anticipate that patient will be ready for discharge tomorrow. Emailed dtr to see what time she can transport.     1445: Palliative confirmed with daughter that pt's code status is still full code. Will update MC.     1450: Updated MC on pt's code status. Daughter will transport at 12:45pm tomorrow back to . New medications to be sent to [facility].  updated on transport time.     RUBINA King

## 2022-02-07 NOTE — PLAN OF CARE
Pertinent Assessments: VSS. Pt uncooperative at times, refused certain vitals and meds. Reapproached as able. Arousable, but very tired after AM zyprexa. Up adlib. Sitter at bedside.   Major Shift Events: None   Treatment Plan: Continue med mgmt. Awaiting placement. SW following. Electrolyte replacement as needed.

## 2022-02-07 NOTE — PROGRESS NOTES
[facility]    Hospitalist Progress Note  Name: Cristel Turk    MRN: [id number]  Provider:  Caro Cha PA-C  Date of Service: 02/07/2022    Assessment & Plan   Summary of Stay: Cristel Turk is a 62 year old lady with past medical history of depression and Alzheimer's dementia who lives in an memory care unit and was admitted to [facility] on 1/29/2022 with behavior changes described as increased paranoia and confusion.     #Dementia with Acute behavior disturbances  #Psychosis, Paranoia   Presented with several weeks of reports of increased confusion,  paranoia with agitation. History of early onset Alzheimer's dementia. Baseline is advanced memory loss.  She remains with visual hallucinations and paranoia.  No agitation or combativeness.  No evidence of urinary tract infection or acute illnesses identified precipitating behavior disturbance. See neg Ucx (1/29)    Prior to this admission was taking Celexa, donepezil and Namenda.  She was prescribed trazodone prior to admission but had not yet started.      -Psychiatry has been consulted in her care, Dr. Zheng saw on 1/30, Dr. Quinones on 2/1 and 2/4/2022.  -she remains admitted for further cares and would be suitable for inpatient psychiatric care if unable to return facility. Declined by geriatric psychiatry facilities as of 2/4/22. SW has discussed option for patient to return to her previous memory care facility. Patient's previous memory care ([facility]) is willing to accept patient back on 2/8/22 now with improvement in behavioral disturbances after medication adjustments made while admitted.     Medication management this admission:  ->1/30 Seroquel, refusing PO, given IM Zyprexa, IV ativan -> somewhat improved with zyprexa but not reliabling taking PO remained with paranoid psychosis  ->2/1 started on ativan/iv haldol per Dr. Quinones, getting scheduled IV to avoid IM, Zyprexa TID (QTC WNL)  Haldol 2 mg IV twice daily.  Ativan  0.5 mg IV twice daily  Olnazepam 5 mg ODT 3 times daily   Continue Namenda 5 mg and Aricept 10 mg daily  Continue melatonin 3 mg at bedtime  Continue citalopram 20 mg daily  -> 2/2 lost IV access, replaced IV given lack of PO med compliance -> still requiring bedside attendant  -> 2/3 -> Intake coord. With Psychiatry (Haylie) Indicating would not accept patient if still requiring haldol IV rx, last given 2/2 11:20 was haldol + ativan.   Pt remains resistant to PO medications at times, cooperative with encouragement from nursing. Paranoia increased with ativan. Appears much better off Ativan.  Discussed with Dr. Quinones recommendations to avoid IV medications who advised increased dose of Zyprexa (15 mg PO BID) with IM Zyprexa if needed. For now will increase Zyprexa to 15 mg BID, however this is maxing recc. Dose of Zyprexa daily so would not recommend additional zyprexa if need additional pharmaceutical intervention will continue Haldol prn. Donepezil discontinued.   2/4 reconsult Dr. Zheng completed.  Continue citalopram, melatonin, memantine, and olanzapine.   2/6-2/7 - no acute concerns, patient usually willing to take morning Olanzapine if crushed into ice cream, able to get patient to take remainder of meds around 11 AM. No acute concerns. Plan to return to  on 2/8/22.     -- No need for additional medical work up at this time.     COVID: tested negative on 2/4/22  DVT Prophylaxis: Ambulate every shift  Code Status: Full Code  Disposition: Expected discharge in 1 day back to [facility] care     Caro Cha [facility]    Interval History   Patient is resting comfortable upon assessing midmorning. Unable to obtain much of a history since patient wants to rest.     Reassess patient this afternoon, she is sitting up in the chair and intermittently talking to something she reports is on the bed rail. She answers questions when directly asked, flat affect, and no significant  agitation.    Called and updated the patient's daughter Miriam with all questions answered.     -Data reviewed today: I reviewed all new labs and imaging reports over the last 24 hours.    Physical Exam   Temp: 97.5  F (36.4  C) Temp src: Oral BP: 108/52 Pulse: 67   Resp: 16 SpO2: 100 % O2 Device: None (Room air)    Vitals:    01/29/22 1145 01/29/22 1556 02/06/22 1032   Weight: 51.9 kg (114 lb 6.7 oz) 52.8 kg (116 lb 8 oz) 51.6 kg (113 lb 12.8 oz)     Vital Signs with Ranges  Temp:  [97.4  F (36.3  C)-97.8  F (36.6  C)] 97.5  F (36.4  C)  Pulse:  [55-72] 67  Resp:  [14-16] 16  BP: (108)/(52) 108/52  SpO2:  [98 %-100 %] 100 %  I/O last 3 completed shifts:  In: 720 [P.O.:720]  Out: -     GEN:  Alert, oriented to self, appears comfortable laying on side in bed this morning and chair in the afternoon, NAD.  HEENT:  Normocephalic/atraumatic, no scleral icterus, no nasal discharge, mouth moist.  CV:  Regular rate and rhythm, no murmur or JVD.  S1 + S2 noted, no S3 or S4.  LUNGS:  Clear to auscultation bilaterally without rales/rhonchi/wheezing/retractions.  Symmetric chest rise on inhalation noted.  ABD:  Active bowel sounds, soft, non-tender/non-distended.  No rebound/guarding/rigidity.  EXT:  No edema.  No cyanosis.  No acute joint synovitis noted.  SKIN:  Dry to touch, no exanthems noted in the visualized areas.  NEURO: on reassessment in afternoon patient is oriented to self, follows commands, flat affect, appears to have visual hallucinations that are not disturbing her when talking to bed rail, no focal deficits   PSYCH: no aggressive behavior noted toward staff     Medications       citalopram  20 mg Oral Daily     melatonin  3 mg Oral At Bedtime     memantine  5 mg Oral QAM AC     multivitamin w/minerals  1 tablet Oral Daily     OLANZapine zydis  15 mg Oral BID     Data   Results for orders placed or performed during the hospital encounter of 01/29/22   Basic metabolic panel     Status: Abnormal   Result Value Ref  Range    Sodium 141 133 - 144 mmol/L    Potassium 3.0 (L) 3.4 - 5.3 mmol/L    Chloride 105 94 - 109 mmol/L    Carbon Dioxide (CO2) 25 20 - 32 mmol/L    Anion Gap 11 3 - 14 mmol/L    Urea Nitrogen 22 7 - 30 mg/dL    Creatinine 0.75 0.52 - 1.04 mg/dL    Calcium 9.4 8.5 - 10.1 mg/dL    Glucose 89 70 - 99 mg/dL    GFR Estimate 88 >60 mL/min/1.73m2   UA with Microscopic reflex to Culture     Status: Abnormal    Specimen: Urine, Clean Catch   Result Value Ref Range    Color Urine Yellow Colorless, Straw, Light Yellow, Yellow    Appearance Urine Clear Clear    Glucose Urine Negative Negative mg/dL    Bilirubin Urine Small (A) Negative    Ketones Urine >150 (A) Negative mg/dL    Specific Gravity Urine 1.032 1.003 - 1.035    Blood Urine Trace (A) Negative    pH Urine 6.0 5.0 - 7.0    Protein Albumin Urine 50  (A) Negative mg/dL    Urobilinogen Urine Normal Normal, 2.0 mg/dL    Nitrite Urine Negative Negative    Leukocyte Esterase Urine Moderate (A) Negative    Mucus Urine Present (A) None Seen /LPF    RBC Urine 3 (H) <=2 /HPF    WBC Urine 15 (H) <=5 /HPF    Squamous Epithelials Urine 2 (H) <=1 /HPF    Hyaline Casts Urine 1 <=2 /LPF    Narrative    Urine Culture ordered based on laboratory criteria   TSH with free T4 reflex     Status: Normal   Result Value Ref Range    TSH 1.44 0.40 - 4.00 mU/L   Asymptomatic COVID-19 Virus (Coronavirus) by PCR Nasopharyngeal     Status: Normal    Specimen: Nasopharyngeal; Swab   Result Value Ref Range    SARS CoV2 PCR Negative Negative    Narrative    Testing was performed using the damián  SARS-CoV-2 & Influenza A/B Assay on the damián  Sharron  System.  This test should be ordered for the detection of SARS-COV-2 in individuals who meet SARS-CoV-2 clinical and/or epidemiological criteria. Test performance is unknown in asymptomatic patients.  This test is for in vitro diagnostic use under the FDA EUA for laboratories certified under CLIA to perform moderate and/or high complexity testing. This  test has not been FDA cleared or approved.  A negative test does not rule out the presence of PCR inhibitors in the specimen or target RNA in concentration below the limit of detection for the assay. The possibility of a false negative should be considered if the patient's recent exposure or clinical presentation suggests COVID-19.  [facility] are certified under the Clinical Laboratory Improvement Amendments of 1988 (CLIA-88) as qualified to perform moderate and/or high complexity laboratory testing.   CBC with platelets and differential     Status: None   Result Value Ref Range    WBC Count 5.6 4.0 - 11.0 10e3/uL    RBC Count 4.60 3.80 - 5.20 10e6/uL    Hemoglobin 14.5 11.7 - 15.7 g/dL    Hematocrit 42.6 35.0 - 47.0 %    MCV 93 78 - 100 fL    MCH 31.5 26.5 - 33.0 pg    MCHC 34.0 31.5 - 36.5 g/dL    RDW 12.4 10.0 - 15.0 %    Platelet Count 184 150 - 450 10e3/uL    % Neutrophils 71 %    % Lymphocytes 20 %    % Monocytes 8 %    % Eosinophils 1 %    % Basophils 0 %    % Immature Granulocytes 0 %    NRBCs per 100 WBC 0 <1 /100    Absolute Neutrophils 4.0 1.6 - 8.3 10e3/uL    Absolute Lymphocytes 1.1 0.8 - 5.3 10e3/uL    Absolute Monocytes 0.4 0.0 - 1.3 10e3/uL    Absolute Eosinophils 0.0 0.0 - 0.7 10e3/uL    Absolute Basophils 0.0 0.0 - 0.2 10e3/uL    Absolute Immature Granulocytes 0.0 <=0.4 10e3/uL    Absolute NRBCs 0.0 10e3/uL   Potassium     Status: Abnormal   Result Value Ref Range    Potassium 3.3 (L) 3.4 - 5.3 mmol/L   Magnesium     Status: Normal   Result Value Ref Range    Magnesium 2.2 1.6 - 2.3 mg/dL   Phosphorus     Status: Abnormal   Result Value Ref Range    Phosphorus 2.0 (L) 2.5 - 4.5 mg/dL   CBC with platelets     Status: Normal   Result Value Ref Range    WBC Count 6.3 4.0 - 11.0 10e3/uL    RBC Count 4.15 3.80 - 5.20 10e6/uL    Hemoglobin 12.9 11.7 - 15.7 g/dL    Hematocrit 38.3 35.0 - 47.0 %    MCV 92 78 - 100 fL    MCH 31.1 26.5 - 33.0 pg    MCHC 33.7 31.5 - 36.5 g/dL    RDW 12.5  10.0 - 15.0 %    Platelet Count 162 150 - 450 10e3/uL   Comprehensive metabolic panel     Status: Abnormal   Result Value Ref Range    Sodium 144 133 - 144 mmol/L    Potassium 3.5 3.4 - 5.3 mmol/L    Chloride 114 (H) 94 - 109 mmol/L    Carbon Dioxide (CO2) 27 20 - 32 mmol/L    Anion Gap 3 3 - 14 mmol/L    Urea Nitrogen 14 7 - 30 mg/dL    Creatinine 0.70 0.52 - 1.04 mg/dL    Calcium 8.4 (L) 8.5 - 10.1 mg/dL    Glucose 123 (H) 70 - 99 mg/dL    Alkaline Phosphatase 57 40 - 150 U/L    AST 16 0 - 45 U/L    ALT 23 0 - 50 U/L    Protein Total 5.3 (L) 6.8 - 8.8 g/dL    Albumin 2.9 (L) 3.4 - 5.0 g/dL    Bilirubin Total 0.4 0.2 - 1.3 mg/dL    GFR Estimate >90 >60 mL/min/1.73m2   Magnesium     Status: Normal   Result Value Ref Range    Magnesium 2.1 1.6 - 2.3 mg/dL   Ketone Beta-Hydroxybutyrate Quantitative     Status: Normal   Result Value Ref Range    Ketone (Beta-Hydroxybutyrate) Quantitative 0.6 0.0 - 0.6 mmol/L   Potassium     Status: Normal   Result Value Ref Range    Potassium 3.4 3.4 - 5.3 mmol/L   Magnesium     Status: Normal   Result Value Ref Range    Magnesium 2.1 1.6 - 2.3 mg/dL   Phosphorus     Status: Normal   Result Value Ref Range    Phosphorus 3.9 2.5 - 4.5 mg/dL   Phosphorus     Status: Normal   Result Value Ref Range    Phosphorus 3.0 2.5 - 4.5 mg/dL   Potassium     Status: Normal   Result Value Ref Range    Potassium 3.8 3.4 - 5.3 mmol/L   UA with Microscopic     Status: Abnormal   Result Value Ref Range    Color Urine Yellow Colorless, Straw, Light Yellow, Yellow    Appearance Urine Clear Clear    Glucose Urine Negative Negative mg/dL    Bilirubin Urine Negative Negative    Ketones Urine Negative Negative mg/dL    Specific Gravity Urine 1.023 1.003 - 1.035    Blood Urine Negative Negative    pH Urine 5.5 5.0 - 7.0    Protein Albumin Urine 10  (A) Negative mg/dL    Urobilinogen Urine Normal Normal, 2.0 mg/dL    Nitrite Urine Negative Negative    Leukocyte Esterase Urine Small (A) Negative    Bacteria Urine  Few (A) None Seen /HPF    Mucus Urine Present (A) None Seen /LPF    RBC Urine 5 (H) <=2 /HPF    WBC Urine 5 <=5 /HPF    Squamous Epithelials Urine 1 <=1 /HPF    Hyaline Casts Urine 4 (H) <=2 /LPF   Basic metabolic panel     Status: Abnormal   Result Value Ref Range    Sodium 141 133 - 144 mmol/L    Potassium 4.6 3.4 - 5.3 mmol/L    Chloride 110 (H) 94 - 109 mmol/L    Carbon Dioxide (CO2) 27 20 - 32 mmol/L    Anion Gap 4 3 - 14 mmol/L    Urea Nitrogen 13 7 - 30 mg/dL    Creatinine 0.67 0.52 - 1.04 mg/dL    Calcium 8.8 8.5 - 10.1 mg/dL    Glucose 89 70 - 99 mg/dL    GFR Estimate >90 >60 mL/min/1.73m2   CBC with platelets     Status: Abnormal   Result Value Ref Range    WBC Count 6.4 4.0 - 11.0 10e3/uL    RBC Count 4.30 3.80 - 5.20 10e6/uL    Hemoglobin 13.5 11.7 - 15.7 g/dL    Hematocrit 40.8 35.0 - 47.0 %    MCV 95 78 - 100 fL    MCH 31.4 26.5 - 33.0 pg    MCHC 33.1 31.5 - 36.5 g/dL    RDW 13.0 10.0 - 15.0 %    Platelet Count 141 (L) 150 - 450 10e3/uL   Asymptomatic COVID-19 Virus (Coronavirus) by PCR Nasopharyngeal     Status: Normal    Specimen: Nasopharyngeal; Swab   Result Value Ref Range    SARS CoV2 PCR Negative Negative    Narrative    Testing was performed using the damián  SARS-CoV-2 & Influenza A/B Assay on the damián  Sharron  System.  This test should be ordered for the detection of SARS-COV-2 in individuals who meet SARS-CoV-2 clinical and/or epidemiological criteria. Test performance is unknown in asymptomatic patients.  This test is for in vitro diagnostic use under the FDA EUA for laboratories certified under CLIA to perform moderate and/or high complexity testing. This test has not been FDA cleared or approved.  A negative test does not rule out the presence of PCR inhibitors in the specimen or target RNA in concentration below the limit of detection for the assay. The possibility of a false negative should be considered if the patient's recent exposure or clinical presentation suggests COVID-19.  [facility] are certified under the Clinical Laboratory Improvement Amendments of 1988 (CLIA-88) as qualified to perform moderate and/or high complexity laboratory testing.   Extra Red Top Tube     Status: None   Result Value Ref Range    Hold Specimen JIC    Potassium     Status: Normal   Result Value Ref Range    Potassium 3.7 3.4 - 5.3 mmol/L   Magnesium     Status: Normal   Result Value Ref Range    Magnesium 2.1 1.8 - 2.6 mg/dL   Phosphorus     Status: Normal   Result Value Ref Range    Phosphorus 3.3 2.5 - 4.5 mg/dL   Potassium     Status: Normal   Result Value Ref Range    Potassium 3.8 3.4 - 5.3 mmol/L   Phosphorus     Status: Normal   Result Value Ref Range    Phosphorus 3.6 2.5 - 4.5 mg/dL   EKG 12-lead, tracing only     Status: None   Result Value Ref Range    Systolic Blood Pressure  mmHg    Diastolic Blood Pressure  mmHg    Ventricular Rate 53 BPM    Atrial Rate 53 BPM    ID Interval 140 ms    QRS Duration 74 ms     ms    QTc 422 ms    P Axis 68 degrees    R AXIS 80 degrees    T Axis 59 degrees    Interpretation ECG       Sinus bradycardia  Otherwise normal ECG    Confirmed by MD HAZEL, COLEEN ([id number]),  ANITHA ROMERO ([id number]) on 2/2/2022 6:41:54 AM     Care Management / Social Work IP Consult     Status: None ()    Rhoda James     1/29/2022  3:01 PM  Care Management Follow Up    Length of Stay (days): 0    Expected Discharge Date:       Concerns to be Addressed: discharge planning     Patient plan of care discussed at interdisciplinary rounds: No    Anticipated Discharge Disposition: Skilled Nursing Facilty     Anticipated Discharge Services: None  Anticipated Discharge DME: None    Patient/family educated on Medicare website which has current   facility and service quality ratings: no  Education Provided on the Discharge Plan:    Patient/Family in Agreement with the Plan: unable to assess    Referrals Placed by CM/SW:    Private pay costs discussed: Not  applicable    Additional Information:  See ED note for SW consult.       Rhoda MORATAYACarl Galdamez       Psychiatry IP Consult: paranoia; Consultant may enter orders: Yes; Patient to be seen: Routine; Call back #: [phone]; Requesting provider? Hospitalist (if different from attending physician)     Status: None ()    Maria Dolores Spencer MD     1/30/2022 12:04 PM  See dictation. #[id number]  Psychiatry   Initial Consultation  Patient seen, notes reviewed. Call prn until discharge.   Maria Dolores Zheng MD  1/30/2022       Care Management / Social Work IP Consult     Status: None ()    Haydee Paredes RN     1/30/2022 11:01 AM  CM/SW consulted for discharge planning, assessment was already   completed by SW in the ED. Per AM care rounds, awaiting psych   eval at this time. CM/SW will continue to follow along and   coordinate discharge planning once medical plan known.    Haydee Langston RN BSN   Inpatient Care Coordination  [facility]   Phone [phone]     Psychiatry IP Consult: follow up for disposition and medication interventions; Consultant may enter orders: Yes; Patient to be seen: Routine; Call back #: [phone]; Requesting provider? Hospitalist (if different from attending physician)     Status: None ()    Miguelangel Gregory MD     2/1/2022 11:57 AM  Patient seen and chart reviewed. Formal consult   dictated.(F/U,polychom 11;10-11:15,I was on 5b univ pt at [place] [id number])    Miguelangel Quinones MD   Psychiatry IP Consult: follow up eval, antipsychotic management for paranoia; Consultant may enter orders: Yes; Patient to be seen: Routine; Call back #: [phone]; Requesting provider? Hospitalist (if different from attending physician); Name:...     Status: None ()    Maria Dolores Spencer MD     2/4/2022  1:30 PM  Psychiatry     Follow Up Consultation  Patient seen and case discussed with nursing and .   Patient initially  seen January 30th and has been slowly improving   to close to baseline. She is up out of bed, still has occasional   agitation and some inconsistency of taking PO and meds. No   untoward side efffects. [facility]. She has sitter   currently mostly for comfort and wandering off an unlocked unit.   She is easily redirected, did take some apple juice for me and   has mostly accepted cares.   VSS Temp 98.1, Pulse 51, /63, RR 15  MSE Patient is a well nourished appearing brunette 62 year old in   a street shirt and pajama bottoms and sneakers. Ambulatory, some   pacing in room. She had no emotioinal response to picture of   family. She is disoriented except to her name. Recognizes name of   her two kids. No evidence of halllucinations. She cannot give me   her birthdate or age. Insight and Judgement impaired. She cannot   give consent. No understanding of current situation. No distress.   Flat affect. Gait was steady. No significant tremor. She did   comply with one step direction to sit and sig juice. 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 Continue current psychotropics and compliance has   been problematic. She appears to be approaching baseline. Return   to memory care when able. Call prn.   Maria Dolores Zheng MD  2/4/2022       Urine Culture     Status: None    Specimen: Urine, Clean Catch   Result Value Ref Range    Culture <10,000 CFU/mL Urogenital arlene    CBC with platelets differential     Status: None    Narrative    The following orders were created for panel order CBC with platelets differential.  Procedure                               Abnormality         Status                     ---------                               -----------         ------                     CBC with platelets  and matty..[[id number]]                      Final result                 Please view results for these tests on the individual orders.   Extra Tube     Status: None    Narrative    The following orders were created for panel order Extra Tube.  Procedure                               Abnormality         Status                     ---------                               -----------         ------                     Extra Red Top Tube[[id number]]                               Final result                 Please view results for these tests on the individual orders.

## 2022-02-07 NOTE — PLAN OF CARE
Patient oriented to self only, Up independently, Sitter at bedside for safety. No IV access. SW following for placement-possible return to [facility] memory care. Will continue current POC.

## 2022-02-07 NOTE — PROGRESS NOTES
[facility]  Palliative Care Progress Note  Text Page     Assessment & Plan   Cristel Turk is a 62 year old female who was admitted on 1/29/2022.   Consulted by Ameya MEZA to assist with symptom management, goals of care, and development of plan of care.     Recommendations:  1. Goals of Care- Full Code-restorative cares  Hospitalization goals discussed Discussed goals with brennan Pineda over the phone. Discussed code status which is accurate and current  Decisional Capacity- Unreliable, There is an advance directive in the EMR dated 11/20/2020. Co primary agents are daughter Miriam Taveras and son Sreedhar Ramos      2. Delirium, psychosis: mimimal improvement   -Continue with olanzapine 15 mg BID as ordered  -Redirection as able    3. Malnutrition  -small frequent meal, high protein snacks,   -Appreciate the input of dietician for on going recommendations     4. Spiritual Care  Oriented to Spiritual Health as part of Palliative Care team. Spiritual Health Services declined at this time.  Spiritual Background: Orthodox      5. Care Planning  Appreciate Care of Mary Eid, for discharge planning as able.    Medical Decision Making and Goals of Care:  Discussed on February 7, 2022 with Kristi YEE CNP: phone call placed to brennan Pineda. Discussed and reviewed overall concerns and medical updates. Discussed code status and goals of care. All are current and up to date. Questions asked and answered.     Kristi YEE CNP  Pain Management and Palliative Care  [facility]  Pgr: [phone]    Time Spent on this Encounter   Total unit/floor time 15 minutes, time consisted of the following, examination of the patient, reviewing the record and completing documentation. >50% of time spent in counseling and coordination of care, Bedside Nurse Loan, Hospitalist Caro Cha PA-C and  Mary RUST.  Time spend counseling with family  consisted of the following topics, goals of care.         Review of Systems   Unable to assess due to confusion    Physical Exam   Temp:  [97.4  F (36.3  C)-97.8  F (36.6  C)] 97.4  F (36.3  C)  Pulse:  [55-72] 55  Resp:  [14] 14  BP: (117)/(62) 117/62  SpO2:  [95 %-98 %] 98 %  113 lbs 12.8 oz  Exam:  GENERAL APPEARANCE:  Alert, cooperative  RESP:  Breathing is even and nonlabored  PSYCH:  oriented to self    Medications       citalopram  20 mg Oral Daily     melatonin  3 mg Oral At Bedtime     memantine  5 mg Oral QAM AC     multivitamin w/minerals  1 tablet Oral Daily     OLANZapine zydis  15 mg Oral BID       Data   Results for orders placed or performed during the hospital encounter of 01/29/22 (from the past 24 hour(s))   Potassium   Result Value Ref Range    Potassium 3.8 3.4 - 5.3 mmol/L   Phosphorus   Result Value Ref Range    Phosphorus 3.6 2.5 - 4.5 mg/dL

## 2022-02-08 VITALS
SYSTOLIC BLOOD PRESSURE: 107 MMHG | OXYGEN SATURATION: 97 % | HEART RATE: 92 BPM | HEIGHT: 61 IN | TEMPERATURE: 98.8 F | BODY MASS INDEX: 21.49 KG/M2 | RESPIRATION RATE: 16 BRPM | WEIGHT: 113.8 LBS | DIASTOLIC BLOOD PRESSURE: 55 MMHG

## 2022-02-08 LAB
MAGNESIUM SERPL-MCNC: 2.1 MG/DL (ref 1.8–2.6)
PHOSPHATE SERPL-MCNC: 2.6 MG/DL (ref 2.5–4.5)
POTASSIUM BLD-SCNC: 3.4 MMOL/L (ref 3.4–5.3)

## 2022-02-08 PROCEDURE — 250N000013 HC RX MED GY IP 250 OP 250 PS 637: Performed by: PHYSICIAN ASSISTANT

## 2022-02-08 PROCEDURE — 83735 ASSAY OF MAGNESIUM: CPT | Performed by: INTERNAL MEDICINE

## 2022-02-08 PROCEDURE — 250N000013 HC RX MED GY IP 250 OP 250 PS 637: Performed by: INTERNAL MEDICINE

## 2022-02-08 PROCEDURE — 99207 PR CDG-CODE CATEGORY CHANGED: CPT | Performed by: PHYSICIAN ASSISTANT

## 2022-02-08 PROCEDURE — 84132 ASSAY OF SERUM POTASSIUM: CPT | Performed by: PHYSICIAN ASSISTANT

## 2022-02-08 PROCEDURE — 36415 COLL VENOUS BLD VENIPUNCTURE: CPT | Performed by: INTERNAL MEDICINE

## 2022-02-08 PROCEDURE — 84100 ASSAY OF PHOSPHORUS: CPT | Performed by: PHYSICIAN ASSISTANT

## 2022-02-08 PROCEDURE — 99238 HOSP IP/OBS DSCHRG MGMT 30/<: CPT | Performed by: PHYSICIAN ASSISTANT

## 2022-02-08 RX ORDER — LANOLIN ALCOHOL/MO/W.PET/CERES
3 CREAM (GRAM) TOPICAL
Qty: 90 TABLET | Refills: 1 | Status: SHIPPED | OUTPATIENT
Start: 2022-02-08

## 2022-02-08 RX ORDER — OLANZAPINE 15 MG/1
15 TABLET, ORALLY DISINTEGRATING ORAL 2 TIMES DAILY
Qty: 60 TABLET | Refills: 1 | Status: SHIPPED | OUTPATIENT
Start: 2022-02-08

## 2022-02-08 RX ADMIN — CITALOPRAM HYDROBROMIDE 20 MG: 20 TABLET ORAL at 09:31

## 2022-02-08 RX ADMIN — OLANZAPINE 15 MG: 5 TABLET, ORALLY DISINTEGRATING ORAL at 09:30

## 2022-02-08 RX ADMIN — MEMANTINE 5 MG: 5 TABLET ORAL at 09:30

## 2022-02-08 RX ADMIN — Medication 3 MG: at 02:19

## 2022-02-08 RX ADMIN — MULTIPLE VITAMINS W/ MINERALS TAB 1 TABLET: TAB at 09:30

## 2022-02-08 ASSESSMENT — ACTIVITIES OF DAILY LIVING (ADL)
ADLS_ACUITY_SCORE: 6

## 2022-02-08 NOTE — PROGRESS NOTES
Patient's After Visit Summary was reviewed with patient and/or daughter  Patient verbalized understanding of After Visit Summary, recommended follow up and was given an opportunity to ask questions.   Discharge medications sent home with patient/family: No   Discharged with daughter

## 2022-02-08 NOTE — PROGRESS NOTES
Care Management Discharge Note    Discharge Date: 02/08/2022     Discharge Disposition: [facility]    Discharge Services: None    Discharge DME: None    Discharge Transportation: family or friend will provide. Daughter Miriam at 12:45pm    Private pay costs discussed: Not applicable    Education Provided on the Discharge Plan:    Persons Notified of Discharge Plans:  Lupe [phone]  Patient/Family in Agreement with the Plan: Yes    Handoff Referral Completed: No    Additional Information:  Discharge orders faxed to [facility] (F) [phone]. Meds sent to [facility] to be filled. Daughter will transport at 12:45pm.     RUBINA King

## 2022-02-08 NOTE — PROGRESS NOTES
[facility]    Palliative Care Chart Check or Chart Review    This patient's H+P, most recent hospitalist note, most recent consultant notes, medication profile, labs from this admition and labs from the past 24 hours have been reviewed.      Recommendation:   -goals are established and daughter is not interested in talking about further goals at this time    It has been determined that no change is necessary to the current plan of care at this time.     If you would like the patient to be seen, please re-consult our service    Time Spent 5 minutes, none in direct contact with patient or family      Thank you!    Kristi YEE, CNP:  Pain Management and Palliative Care  [facility] [place]  Pgr: [phone]

## 2022-02-08 NOTE — PROGRESS NOTES
Pt Alert to self, refusing vitals and assessment. Pt was able to take AM medications including zyprexa crushed in ice cream. Pt very restless this morning, pacing back and fourth in room. Sitter present in room. No IV access. Pt to discharge back to [facility]. Daughter to transport back to [facility] at 1245 today. Continue to monitor.      Temp: 98.8  F (37.1  C) Temp src: Oral BP: 107/55 Pulse: 92   Resp: 16 SpO2: 97 % O2 Device: None (Room air)

## 2022-02-08 NOTE — DISCHARGE SUMMARY
Discharge Summary  Hospitalist Service    Cristel Turk MRN# [id number]   YOB: 1960 Age: 62 year old     Date of Admission: 1/29/2022  Date of Discharge: 2/8/2022  Admitting Physician: Kp Frederick MD  Discharge Physician: Kristen Squires PA-C  Discharging Service: Hospitalist Service     Primary Provider: Miguelangel Dent  Primary Care Physician Phone Number: [phone]         Discharge Diagnoses/Problem Oriented Hospital Course (Providers):      Cristel Turk was admitted on 1/29/2022 by Kp Frederick MD and I would refer you to their history and physical.  Briefly, patient was admitted for behavior changes described as increased paranoia and confusion. Work up in the ED did not show evidence of metabolic or infectious encephalopathy. Psychiatry was consulted and was declined by inpatient geriatric psychaitry. Many different antipsychotic medicines were trialed (details below) and patient was ultimately transferred to back to memory care on Olanzapine BID.     #Dementia with Acute behavior disturbances  #Psychosis, Paranoia   Presented with several weeks of reports of increased confusion,  paranoia with agitation. History of early onset Alzheimer's dementia. Baseline is advanced memory loss.  She remains with visual hallucinations and paranoia.  No agitation or combativeness.  No evidence of urinary tract infection or acute illnesses identified precipitating behavior disturbance. See neg Ucx (1/29)    Prior to this admission was taking Celexa, donepezil and Namenda.  She was prescribed trazodone prior to admission but had not yet started.      -Psychiatry has been consulted in her care, Dr. Zheng saw on 1/30, Dr. Quinones on 2/1 and 2/4/2022.  -she remains admitted for further cares and would be suitable for inpatient psychiatric care if unable to return facility. Declined by geriatric psychiatry facilities as of 2/4/22. SW has discussed option for patient to return to her previous memory  care facility. Patient's previous memory care ([facility]) is willing to accept patient back on 2/8/22 now with improvement in behavioral disturbances after medication adjustments made while admitted.     Medication management this admission:  ->1/30 Seroquel, refusing PO, given IM Zyprexa, IV ativan -> somewhat improved with zyprexa but not reliabling taking PO remained with paranoid psychosis  ->2/1 started on ativan/iv haldol per Dr. Quinones, getting scheduled IV to avoid IM, Zyprexa TID (QTC WNL)  Haldol 2 mg IV twice daily.  Ativan 0.5 mg IV twice daily  Olnazepam 5 mg ODT 3 times daily   Continue Namenda 5 mg and Aricept 10 mg daily  Continue melatonin 3 mg at bedtime  Continue citalopram 20 mg daily  -> 2/2 lost IV access, replaced IV given lack of PO med compliance -> still requiring bedside attendant  -> 2/3 -> Intake coord. With Psychiatry (Haylie) Indicating would not accept patient if still requiring haldol IV rx, last given 2/2 11:20 was haldol + ativan.   Pt remains resistant to PO medications at times, cooperative with encouragement from nursing. Paranoia increased with ativan. Appears much better off Ativan.  Discussed with Dr. Quinones recommendations to avoid IV medications who advised increased dose of Zyprexa (15 mg PO BID) with IM Zyprexa if needed. For now will increase Zyprexa to 15 mg BID, however this is maxing recc. Dose of Zyprexa daily so would not recommend additional zyprexa if need additional pharmaceutical intervention will continue Haldol prn. Donepezil discontinued.   2/4 reconsult Dr. Zheng completed.  Continue citalopram, melatonin, memantine, and olanzapine.   2/6-2/7 - no acute concerns, patient usually willing to take morning Olanzapine if crushed into ice cream, able to get patient to take remainder of meds around 11 AM. No acute concerns. Plan to return to  on 2/8/22.     -- No need for additional medical work up at this time.      COVID: tested negative          Code  Status:      Full Code        Brief Hospital Stay Summary Sent Home With Patient in AVS:          Reason for your hospital stay      You were admitted for paranoia and altered mental status. This improved   with adjustment of your medicines.                          Pending Results:        Unresulted Labs Ordered in the Past 30 Days of this Admission     Date and Time Order Name Status Description    2/8/2022 12:02 AM Magnesium In process             Discharge Instructions and Follow-Up:      Follow-up Appointments     Follow-up and recommended labs and tests       Follow up with primary care provider, Miguelangel Dent, within 7 days for   hospital follow- up.  No follow up labs or test are needed.               Discharge Disposition:        Discharged to home         Discharge Medications:        Current Discharge Medication List      START taking these medications    Details   melatonin 3 MG tablet Take 1 tablet (3 mg) by mouth nightly as needed for sleep  Qty: 90 tablet, Refills: 1    Associated Diagnoses: Paranoia (H)      OLANZapine zydis (ZYPREXA) 15 MG ODT Take 1 tablet (15 mg) by mouth 2 times daily  Qty: 60 tablet, Refills: 1    Associated Diagnoses: Paranoia (H)         CONTINUE these medications which have NOT CHANGED    Details   citalopram (CELEXA) 10 MG tablet Take 20 mg by mouth daily      memantine (NAMENDA) 5 MG tablet Take 1 tablet (5 mg) by mouth daily before breakfast  Qty: 28 tablet, Refills: 3    Associated Diagnoses: Early onset Alzheimer's dementia without behavioral disturbance (H)      multivitamin w/minerals (MULTI-VITAMIN) tablet Take 1 tablet by mouth daily         STOP taking these medications       donepezil (ARICEPT) 10 MG tablet Comments:   Reason for Stopping:                 Allergies:       No Known Allergies        Consultations This Hospital Stay:        Consultation during this admission received from psychiatry         Condition and Physical on Discharge:        Discharge  "condition: Stable   Vitals: Blood pressure 107/55, pulse 92, temperature 98.8  F (37.1  C), temperature source Oral, resp. rate 16, height 1.549 m (5' 1\"), weight 51.6 kg (113 lb 12.8 oz), SpO2 97 %, not currently breastfeeding.     Constitutional: Alert but not orientated. Does not follow direction. Nonsenical while talking     Lungs: CTAB   Cardiovascular: RRR with no murmur   Abdomen: Bowel sounds are present with no tenderness   Skin: No rash or open sores    Other: Rambling non sensical speech. Restless and easily agitated.         Discharge Time:      Less than 30 minutes.        Image Results From This Hospital Stay (For Non-EPIC Providers):        Results for orders placed or performed in visit on 01/27/20   Mammogram - HIM Scan    Narrative    Result Impression    There is no radiographic evidence for malignancy.  Recommend   annual mammograms.    A lay language report of this examination will be provided to the patient.       MAMMOGRAM ASSESSMENT:  ACR 1 Negative  Other Result Information  This result has an attachment that is not available.  Result Narrative  XR MAMMO BILAT SCREENING [[id number]]    CLINICAL HISTORY:  This is an asymptomatic 59 y.o. patient.    INDICATION FOR EXAM: Mammogram Screening.    TECHNIQUE: CC & MLO views were obtained.  This digital study was evaluated   with the assistance of Computer-Aided Detection.       COMPARISON FILM: Yes  1/2/18 [facility]      FINDINGS:  Mammographically, the breast tissue has scattered   fibroglandular densities.  There are no dominant masses, suspicious micro   calcifications or areas of architectural distortion.               Most Recent Lab Results In EPIC (For Non-EPIC Providers):    Most Recent 3 CBC's:  Recent Labs   Lab Test 02/04/22  1250 01/30/22  0530 01/29/22  1036   WBC 6.4 6.3 5.6   HGB 13.5 12.9 14.5   MCV 95 92 93   * 162 184      Most Recent 3 BMP's:  Recent Labs   Lab Test 02/08/22  0626 02/07/22  0819 02/06/22  0920 " 02/04/22  1250 01/31/22  0609 01/30/22  0530 01/29/22  1651 01/29/22  1036   NA  --   --   --  141  --  144  --  141   POTASSIUM 3.4 3.8 3.7 4.6   < > 3.5   < > 3.0*   CHLORIDE  --   --   --  110*  --  114*  --  105   CO2  --   --   --  27  --  27  --  25   BUN  --   --   --  13  --  14  --  22   CR  --   --   --  0.67  --  0.70  --  0.75   ANIONGAP  --   --   --  4  --  3  --  11   MARCELA  --   --   --  8.8  --  8.4*  --  9.4   GLC  --   --   --  89  --  123*  --  89    < > = values in this interval not displayed.     Most Recent 3 Troponin's:No lab results found.  Most Recent 3 INR's:  Recent Labs   Lab Test 11/19/19  1412   INR 1.01     Most Recent 2 LFT's:  Recent Labs   Lab Test 01/30/22  0530 08/18/21  1217   AST 16 24   ALT 23 17   ALKPHOS 57 83   BILITOTAL 0.4 0.6     Most Recent Cholesterol Panel:  Recent Labs   Lab Test 08/18/21  1217   CHOL 222*      HDL 67   TRIG 145     Most Recent 6 Bacteria Isolates From Any Culture (See EPIC Reports for Culture Details):  Recent Labs   Lab Test 11/19/19  1540   CULT No growth     Most Recent TSH, T4 and HgbA1c:   Recent Labs   Lab Test 01/29/22  1036   TSH 1.44

## 2022-02-08 NOTE — PLAN OF CARE
Vitals are Temp: 98.8  F (37.1  C) Temp src: Oral BP: 107/55 Pulse: 92   Resp: 16 SpO2: 97 %.  Patient is alert to self. They are independent with no assistive devices .  Pt is a Regular diet.  They are not showing any nonverbal signs of being in pain.  Patient is Saline locked.    Pt received zyprexa earlier in shift and laid down in bed for awhile to sleep after a while pt began walking around room and melatonin was given - pt did not get a good nights rest during night shift and was up walking around her room for most of the night. Pt takes meds crushed in ice cream. Pt possibly to be discharged back to memory care - will continue monitoring.     Pt refused all assessments and is hard to redirect tonight

## 2022-02-09 ENCOUNTER — PATIENT OUTREACH (OUTPATIENT)
Dept: CARE COORDINATION | Facility: CLINIC | Age: 62
End: 2022-02-09

## 2022-02-09 DIAGNOSIS — Z71.89 OTHER SPECIFIED COUNSELING: ICD-10-CM

## 2022-02-09 NOTE — PROGRESS NOTES
Clinic Care Coordination Contact    Background: Care Coordination referral placed from [facility] discharge report for reason of patient meeting criteria for a TCM outreach call by [facility] team.    Assessment: Upon chart review, [facility] Team member will cancel/close the referral for TCM outreach due to reason below:    Patient has discharged to a Group home, Memory Care or Nursing Home    Plan: Care Coordination referral for TCM outreach canceled.    Trista Ratliff  Community Health Worker  [facility] [place]  [phone]

## 2022-02-13 ENCOUNTER — HEALTH MAINTENANCE LETTER (OUTPATIENT)
Age: 62
End: 2022-02-13

## 2022-03-03 ENCOUNTER — VIRTUAL VISIT (OUTPATIENT)
Dept: NEUROLOGY | Facility: CLINIC | Age: 62
End: 2022-03-03
Payer: COMMERCIAL

## 2022-03-03 DIAGNOSIS — G30.0 EARLY ONSET ALZHEIMER'S DEMENTIA WITHOUT BEHAVIORAL DISTURBANCE (H): ICD-10-CM

## 2022-03-03 DIAGNOSIS — F02.80 EARLY ONSET ALZHEIMER'S DEMENTIA WITHOUT BEHAVIORAL DISTURBANCE (H): ICD-10-CM

## 2022-03-03 PROCEDURE — 99215 OFFICE O/P EST HI 40 MIN: CPT | Mod: 95 | Performed by: PSYCHIATRY & NEUROLOGY

## 2022-03-03 RX ORDER — DONEPEZIL HYDROCHLORIDE 5 MG/1
TABLET, FILM COATED ORAL
Qty: 210 TABLET | Refills: 0 | Status: SHIPPED | OUTPATIENT
Start: 2022-03-03 | End: 2022-07-01

## 2022-03-03 RX ORDER — DONEPEZIL HYDROCHLORIDE 10 MG/1
TABLET, FILM COATED ORAL
COMMUNITY
Start: 2022-02-13 | End: 2023-01-01

## 2022-03-03 RX ORDER — MEMANTINE HYDROCHLORIDE 5 MG/1
TABLET ORAL
Qty: 28 TABLET | Refills: 11 | Status: SHIPPED | OUTPATIENT
Start: 2022-03-03 | End: 2022-04-13

## 2022-03-03 NOTE — PROGRESS NOTES
[facility] Neurology Follow Up Visit    Cristel Turk MRN# [id number]   Age: 62 year old YOB: 1960     Brief history of symptoms: The patient was initially seen in neurologic consultation on 2/26/2020 and most recently 11/17/2020 for evaluation of memory concerns. Please see the comprehensive neurologic consultation notes from those dates in the Epic records for details.     Overall impression is that of early onset Alzheimer's disease following MRI, Neuropsychology testing, serum studies, CSF studies (no amyloid/tau studies done), and progressive decline from a functional standpoint with repeated cognitive evaluations.  She was to be seen with a dementia specialist to go over her diagnosis, and consider genetic testing as well as advanced imaging if needed. She had well established social support and correct supervision from family including her  Lul.  She was to continue with Donepezil and Memantine at recommended dosing, as well as trial citalopram after our last visit.    Interval history:   - The patient was seen 4/9/2021 with Dr. Dent, and it was thought early onset Alzheimer's was a correct diagnosis.  The patient and her family were to consider genetic testing for APOE, and that family was interest in amyloid -PET imaging as confirmation.  Otherwise it wasn't recommended that the patient obtain advance further CSF analysis.  - The patient was admitted to [facility] 1/29/2022 for paranoia and altered mental status in the setting of UTI resistant to multiple treatment types.  At one point, she was seen with psychiatrist, Dr. Quinones, who indicated she was not taking her medications as directed and was having paranoid psychosis requiring IV haldol and ativan.  It was recommended a Jyoti-psych program/consult be made, however the consult/referral was declined by inpatient geriatric psychiatry for unclear reasons.  She was transferred back to memory care 2/8/2022 with use of Olanzapine 5 mg BID,  melatonin, Memantine, Citalopram.  Her donepezil was d/c'd during admission.    Today, the patient is currently living at a memory care facility, and is followed with a primary care group that will soon have her seen with a geriatric psychiatrist and behavioral health specialist.  Her family does report a fairly large set-back with her behaviors and functionality after the hospitalization.  There are more resistances to medications and recommendations from the patient.  She isn't taking memantine regularly, and is taking olanzapine 5 mg BID.  These medications have helped with reducing paranoia to a great degree.  While on the medication, it has been noted that she is having occasional hallucinations and having more slurred speech.  Sleep is a challenge due to difficulty winding down.  The family indicates that the patient is misplacing words and not using full sentences anymore, with a large degree of mumbling.           Assessment and Plan:   Assessment:  Late onset Alzheimer's dementia with behavioral changes    The patient is back to her prior baseline with still some worsened deficits related to speech output, and sundowning.  The family is aware of the black box warning and risks for olanzapine in patient's with dementia, but her benefit from her response regarding paranoia greatly outweighs that risk.  Her speech issues were relatively acute, and I mostly suspect this is related to her dementia, but I cannot clearly indicate that she couldn't have suffered a small infarction leading to expressive aphasia given the families reporting.  I think obtaining a head CT is reasonable if the patient can tolerate the procedure without sedation.  Otherwise, I do no quite understand why donepezil was discontinued during her hospitalization given lack of side-effects with the medication for some time and it's benefit for reducing visual hallucinations.  I recommend restarting both donepezil and Namenda (stated to be  on/off use by daughter).     Plan:  - Head CT w/out contrast  - Donepezil 5 mg every day for 30 days, then   - 10 mg every day  - Continue Namenda but titration should occur as follows:   - 5 mg every day for 7 days, then   - 10 mg every day for 7 days, then   - 15 mg every day for 7 days, then   - 20 mg every day for 60 days    - At any point if the medication is not tolerated, then shifting back to 5 mg every day is reasonable  - Agree with olanzapine 5 mg BID for paranoia and no need to switch to quetiapine at this time    Follow up in Neurology clinic in 4 months or earlier as needed should new concerns arise.    PITO Wen D.O.   of Neurology    Total time today (62 min) in this patient encounter was spent on pre-charting, counseling and/or coordination of care.

## 2022-03-03 NOTE — PROGRESS NOTES
Cristel is a 62 year old who is being evaluated via a billable video visit.      How would you like to obtain your AVS? MyChart  If the video visit is dropped, the invitation should be resent by: Send to e-mail at: [email or web address]  Will anyone else be joining your video visit? No      Video Start Time: 1000  Video-Visit Details    Type of service:  Video Visit    Video End Time:10:36 AM    Originating Location (pt. Location): Home    Distant Location (provider location):  [facility] NEUROLOGY [facility]     Platform used for Video Visit: [facility]    Chief Complaint   Patient presents with     RECHECK     VIDEO VISIT RETURN      Aaron Lara

## 2022-03-03 NOTE — LETTER
3/3/2022       RE: Cristel Turk  [address]  [facility] [address]     Dear Colleague,    Thank you for referring your patient, Cristel Turk, to the [facility]OLOG[facility]. Please see a copy of my visit note below.    [facility] Neurology Follow Up Visit    Cristel Turk MRN# [id number]   Age: 62 year old YOB: 1960     Brief history of symptoms: The patient was initially seen in neurologic consultation on 2/26/2020 and most recently 11/17/2020 for evaluation of memory concerns. Please see the comprehensive neurologic consultation notes from those dates in the Epic records for details.     Overall impression is that of early onset Alzheimer's disease following MRI, Neuropsychology testing, serum studies, CSF studies (no amyloid/tau studies done), and progressive decline from a functional standpoint with repeated cognitive evaluations.  She was to be seen with a dementia specialist to go over her diagnosis, and consider genetic testing as well as advanced imaging if needed. She had well established social support and correct supervision from family including her  Lul.  She was to continue with Donepezil and Memantine at recommended dosing, as well as trial citalopram after our last visit.    Interval history:   - The patient was seen 4/9/2021 with Dr. Dent, and it was thought early onset Alzheimer's was a correct diagnosis.  The patient and her family were to consider genetic testing for APOE, and that family was interest in amyloid -PET imaging as confirmation.  Otherwise it wasn't recommended that the patient obtain advance further CSF analysis.  - The patient was admitted to [facility] 1/29/2022 for paranoia and altered mental status in the setting of UTI resistant to multiple treatment types.  At one point, she was seen with psychiatrist, Dr. Quinones, who indicated she was not taking her medications as  directed and was having paranoid psychosis requiring IV haldol and ativan.  It was recommended a Jyoti-psych program/consult be made, however the consult/referral was declined by inpatient geriatric psychiatry for unclear reasons.  She was transferred back to [facility] care 2/8/2022 with use of Olanzapine 5 mg BID, melatonin, Memantine, Citalopram.  Her donepezil was d/c'd during admission.    Today, the patient is currently living at a memory care facility, and is followed with a primary care group that will soon have her seen with a geriatric psychiatrist and behavioral health specialist.  Her family does report a fairly large set-back with her behaviors and functionality after the hospitalization.  There are more resistances to medications and recommendations from the patient.  She isn't taking memantine regularly, and is taking olanzapine 5 mg BID.  These medications have helped with reducing paranoia to a great degree.  While on the medication, it has been noted that she is having occasional hallucinations and having more slurred speech.  Sleep is a challenge due to difficulty winding down.  The family indicates that the patient is misplacing words and not using full sentences anymore, with a large degree of mumbling.           Assessment and Plan:   Assessment:  Late onset Alzheimer's dementia with behavioral changes    The patient is back to her prior baseline with still some worsened deficits related to speech output, and sundowning.  The family is aware of the black box warning and risks for olanzapine in patient's with dementia, but her benefit from her response regarding paranoia greatly outweighs that risk.  Her speech issues were relatively acute, and I mostly suspect this is related to her dementia, but I cannot clearly indicate that she couldn't have suffered a small infarction leading to expressive aphasia given the families reporting.  I think obtaining a head CT is reasonable if the patient can  tolerate the procedure without sedation.  Otherwise, I do no quite understand why donepezil was discontinued during her hospitalization given lack of side-effects with the medication for some time and it's benefit for reducing visual hallucinations.  I recommend restarting both donepezil and Namenda (stated to be on/off use by daughter).     Plan:  - Head CT w/out contrast  - Donepezil 5 mg every day for 30 days, then   - 10 mg every day  - Continue Namenda but titration should occur as follows:   - 5 mg every day for 7 days, then   - 10 mg every day for 7 days, then   - 15 mg every day for 7 days, then   - 20 mg every day for 60 days    - At any point if the medication is not tolerated, then shifting back to 5 mg every day is reasonable  - Agree with olanzapine 5 mg BID for paranoia and no need to switch to quetiapine at this time    Follow up in Neurology clinic in 4 months or earlier as needed should new concerns arise.      PITO Wen D.O.   of Neurology    Total time today (62 min) in this patient encounter was spent on pre-charting, counseling and/or coordination of care.

## 2022-04-25 ENCOUNTER — DOCUMENTATION ONLY (OUTPATIENT)
Dept: NEUROLOGY | Facility: CLINIC | Age: 62
End: 2022-04-25

## 2022-04-26 ENCOUNTER — TELEPHONE (OUTPATIENT)
Dept: NEUROLOGY | Facility: CLINIC | Age: 62
End: 2022-04-26
Payer: COMMERCIAL

## 2022-04-28 RX ORDER — TRAZODONE HYDROCHLORIDE 50 MG/1
TABLET, FILM COATED ORAL
Qty: 16 TABLET | Refills: 10 | OUTPATIENT
Start: 2022-04-28

## 2022-04-28 NOTE — TELEPHONE ENCOUNTER
Medication refused, patient sees Carl at [facility].     Pending Prescriptions:                       Disp   Refills    traZODone (DESYREL) 50 MG tablet [Pharmac*16 tab*10           Sig: TAKE 25MG (1/2 TABLET) BY MOUTH AT BEDTIME

## 2022-05-02 RX ORDER — TRAZODONE HYDROCHLORIDE 50 MG/1
TABLET, FILM COATED ORAL
Qty: 16 TABLET | Refills: 0 | OUTPATIENT
Start: 2022-05-02

## 2022-05-03 ENCOUNTER — LAB REQUISITION (OUTPATIENT)
Dept: LAB | Facility: CLINIC | Age: 62
End: 2022-05-03
Payer: COMMERCIAL

## 2022-05-03 DIAGNOSIS — R53.1 WEAKNESS: ICD-10-CM

## 2022-05-03 LAB
ALBUMIN UR-MCNC: NEGATIVE MG/DL
APPEARANCE UR: CLEAR
BILIRUB UR QL STRIP: NEGATIVE
COLOR UR AUTO: ABNORMAL
GLUCOSE UR STRIP-MCNC: NEGATIVE MG/DL
HGB UR QL STRIP: NEGATIVE
HYALINE CASTS: 1 /LPF
KETONES UR STRIP-MCNC: NEGATIVE MG/DL
LEUKOCYTE ESTERASE UR QL STRIP: ABNORMAL
MUCOUS THREADS #/AREA URNS LPF: PRESENT /LPF
NITRATE UR QL: NEGATIVE
PH UR STRIP: 6 [PH] (ref 5–7)
RBC URINE: 1 /HPF
SP GR UR STRIP: 1.02 (ref 1–1.03)
SQUAMOUS EPITHELIAL: <1 /HPF
UROBILINOGEN UR STRIP-MCNC: <2 MG/DL
WBC URINE: 5 /HPF

## 2022-05-03 PROCEDURE — 81001 URINALYSIS AUTO W/SCOPE: CPT | Mod: ORL | Performed by: FAMILY MEDICINE

## 2022-05-03 PROCEDURE — 87086 URINE CULTURE/COLONY COUNT: CPT | Mod: ORL | Performed by: FAMILY MEDICINE

## 2022-05-05 LAB — BACTERIA UR CULT: NO GROWTH

## 2022-06-15 ENCOUNTER — HOSPITAL ENCOUNTER (OUTPATIENT)
Dept: CT IMAGING | Facility: CLINIC | Age: 62
Discharge: HOME | End: 2022-06-15
Attending: PSYCHIATRY & NEUROLOGY | Admitting: PSYCHIATRY & NEUROLOGY
Payer: COMMERCIAL

## 2022-06-15 DIAGNOSIS — F02.80 EARLY ONSET ALZHEIMER'S DEMENTIA WITHOUT BEHAVIORAL DISTURBANCE (H): ICD-10-CM

## 2022-06-15 DIAGNOSIS — G30.0 EARLY ONSET ALZHEIMER'S DEMENTIA WITHOUT BEHAVIORAL DISTURBANCE (H): ICD-10-CM

## 2022-06-15 PROCEDURE — 70450 CT HEAD/BRAIN W/O DYE: CPT

## 2022-08-02 ENCOUNTER — VIRTUAL VISIT (OUTPATIENT)
Dept: NEUROLOGY | Facility: CLINIC | Age: 62
End: 2022-08-02
Payer: COMMERCIAL

## 2022-08-02 DIAGNOSIS — G30.0 EARLY ONSET ALZHEIMER'S DEMENTIA WITHOUT BEHAVIORAL DISTURBANCE (H): Primary | ICD-10-CM

## 2022-08-02 DIAGNOSIS — F02.80 EARLY ONSET ALZHEIMER'S DEMENTIA WITHOUT BEHAVIORAL DISTURBANCE (H): Primary | ICD-10-CM

## 2022-08-02 PROCEDURE — 99214 OFFICE O/P EST MOD 30 MIN: CPT | Mod: 95 | Performed by: PSYCHIATRY & NEUROLOGY

## 2022-08-02 RX ORDER — MEMANTINE HYDROCHLORIDE 10 MG/1
2 TABLET ORAL SEE ADMIN INSTRUCTIONS
COMMUNITY
Start: 2022-03-11 | End: 2023-01-01

## 2022-08-02 RX ORDER — TRAZODONE HYDROCHLORIDE 50 MG/1
25 TABLET, FILM COATED ORAL AT BEDTIME
COMMUNITY
Start: 2022-01-26

## 2022-08-02 RX ORDER — CHOLECALCIFEROL (VITAMIN D3) 25 MCG
1 TABLET ORAL DAILY
COMMUNITY
Start: 2022-07-13

## 2022-08-02 RX ORDER — CALCIUM CARBONATE 500(1250)
1 TABLET ORAL DAILY
COMMUNITY
Start: 2022-07-13

## 2022-08-02 RX ORDER — MIRTAZAPINE 7.5 MG/1
1 TABLET, FILM COATED ORAL SEE ADMIN INSTRUCTIONS
COMMUNITY
Start: 2022-06-14

## 2022-08-02 NOTE — LETTER
8/2/2022       RE: Cristel Turk  [address]  [facility] [address]     Dear Colleague,    Thank you for referring your patient, Cristel Turk, to the [facility] NEUROLOGY CLINIC [place] at [facility]. Please see a copy of my visit note below.    [facility] Neurology Follow Up Visit    Cristel Turk MRN# [id number]   Age: 62 year old YOB: 1960     Brief history of symptoms: The patient was initially seen in neurologic consultation on 2/26/2020 and most recently 3/3/2022 for evaluation of early onset Alzheimer's dementia. Please see the comprehensive neurologic consultation notes from those dates in the Epic records for details.     At our last visit, the patient was living at a memory care facility where it was noted she wasn't taking namenda regularly, was taking olanzapine, and that these medications were helpful to reduce ongoing paranoia.  She was not taking donepezil after a hospitalization in 2/2022.  After out visit, she was to have imaging and restart donepezil along with namenda regularly (hopeful to reduce visual hallucinations) and to look for etiology behind sudden deficits in speech production.    Interval history:   - Head CT 6/15/2022 showed no evidence for brainstem or cerebrum infarction.    Today, the patient has difficulty indicating where she is and who she is with, but can indicate she trusts those she is with.  Her daughter is here today, and gives most of the medical history and updates on health.  The patient was doing well until about 2 months ago where she was noted to be depressed, and making statement of feeling down.  This was similar to how she behaved a few months before her major decline a year or so ago.  She was working with a psychiatrist in decreasing the Zyprexa she was on for paranoia.  While weaning, another medication was added (mirtazapine).  In just the last week, she has been noted to be smiling more  often, and more upbeat.      She is still taking citalopram 20 mg QD, olanzapine 10 mg BID,  and has started mirtazapine 7.5 mg QD.     She is still taking donepezil and memantine with reports of decreased.      She has started to wear adult diapers as she was having difficult acting upon the urge to urinate.         Assessment and Plan:   Assessment:  Early onset Alzheimer's dementia, moderate to severe     The patient is doing well regarding prior paranoia with use of antipsychotics, and I do think that there has been benefit to her switching to mirtazapine regarding mood.  We discussed side-effects related to dystonia and movement related issues, but I see no major evidence on our visit today for those issues.  I think she can remain on donepezil and memantine for now, given they may be helping with mood regulation and reduction of hallucinations, however I did mention that these medications could be weaned in the future pending side-effects.  We spent time discussing risk for hematoma after a fall given the degree of atrophy noted on head CT, and what types of symptoms a UTI may lead to for Cristel.  Overall, while her dementia is progrssing as expected she has excellent support, and can follow up in one years time.    Plan:  - Memantine 20 mg QD  - Donepezil 10 mg every day    Follow up in Neurology clinic in one year, or earlier as needed should new concerns arise.      PITO Wen D.O.   of Neurology      Total time today (38 min) in this patient encounter was spent on pre-charting, counseling and/or coordination of care.

## 2022-08-02 NOTE — PROGRESS NOTES
[facility] Neurology Follow Up Visit    Cristel Turk MRN# [id number]   Age: 62 year old YOB: 1960     Brief history of symptoms: The patient was initially seen in neurologic consultation on 2/26/2020 and most recently 3/3/2022 for evaluation of early onset Alzheimer's dementia. Please see the comprehensive neurologic consultation notes from those dates in the Epic records for details.     At our last visit, the patient was living at a memory care facility where it was noted she wasn't taking namenda regularly, was taking olanzapine, and that these medications were helpful to reduce ongoing paranoia.  She was not taking donepezil after a hospitalization in 2/2022.  After out visit, she was to have imaging and restart donepezil along with namenda regularly (hopeful to reduce visual hallucinations) and to look for etiology behind sudden deficits in speech production.    Interval history:   - Head CT 6/15/2022 showed no evidence for brainstem or cerebrum infarction.    Today, the patient has difficulty indicating where she is and who she is with, but can indicate she trusts those she is with.  Her daughter is here today, and gives most of the medical history and updates on health.  The patient was doing well until about 2 months ago where she was noted to be depressed, and making statement of feeling down.  This was similar to how she behaved a few months before her major decline a year or so ago.  She was working with a psychiatrist in decreasing the Zyprexa she was on for paranoia.  While weaning, another medication was added (mirtazapine).  In just the last week, she has been noted to be smiling more often, and more upbeat.      She is still taking citalopram 20 mg QD, olanzapine 10 mg BID,  and has started mirtazapine 7.5 mg QD.     She is still taking donepezil and memantine with reports of decreased.      She has started to wear adult diapers as she was having difficult acting upon the urge to  urinate.         Assessment and Plan:   Assessment:  Early onset Alzheimer's dementia, moderate to severe     The patient is doing well regarding prior paranoia with use of antipsychotics, and I do think that there has been benefit to her switching to mirtazapine regarding mood.  We discussed side-effects related to dystonia and movement related issues, but I see no major evidence on our visit today for those issues.  I think she can remain on donepezil and memantine for now, given they may be helping with mood regulation and reduction of hallucinations, however I did mention that these medications could be weaned in the future pending side-effects.  We spent time discussing risk for hematoma after a fall given the degree of atrophy noted on head CT, and what types of symptoms a UTI may lead to for Cristel.  Overall, while her dementia is progrssing as expected she has excellent support, and can follow up in one years time.    Plan:  - Memantine 20 mg QD  - Donepezil 10 mg every day    Follow up in Neurology clinic in one year, or earlier as needed should new concerns arise.    PITO Wen D.O.   of Neurology    Total time today (38 min) in this patient encounter was spent on pre-charting, counseling and/or coordination of care.

## 2022-08-02 NOTE — PROGRESS NOTES
Cristel is a 62 year old who is being evaluated via a billable video visit.      How would you like to obtain your AVS? Mychart  If the video visit is dropped, the invitation should be resent by: [phone]        Video-Visit Details    Video Start Time: 1000    Type of service:  Video Visit    Video End Time:10:32 AM    Originating Location (pt. Location): Home    Distant Location (provider location):  [facility] NEUROLOGY [facility]     Platform used for Video Visit: Check-Cap

## 2022-08-31 ENCOUNTER — LAB REQUISITION (OUTPATIENT)
Dept: LAB | Facility: CLINIC | Age: 62
End: 2022-08-31
Payer: COMMERCIAL

## 2022-08-31 DIAGNOSIS — E55.9 VITAMIN D DEFICIENCY, UNSPECIFIED: ICD-10-CM

## 2022-08-31 DIAGNOSIS — E13.01: ICD-10-CM

## 2022-09-07 LAB
ALBUMIN SERPL BCG-MCNC: 4.2 G/DL (ref 3.5–5.2)
ALP SERPL-CCNC: 94 U/L (ref 35–104)
ALT SERPL W P-5'-P-CCNC: 24 U/L (ref 10–35)
ANION GAP SERPL CALCULATED.3IONS-SCNC: 15 MMOL/L (ref 7–15)
AST SERPL W P-5'-P-CCNC: 35 U/L (ref 10–35)
BASOPHILS # BLD AUTO: 0 10E3/UL (ref 0–0.2)
BASOPHILS NFR BLD AUTO: 0 %
BILIRUB SERPL-MCNC: 0.3 MG/DL
BUN SERPL-MCNC: 10.3 MG/DL (ref 8–23)
CALCIUM SERPL-MCNC: 9.3 MG/DL (ref 8.8–10.2)
CHLORIDE SERPL-SCNC: 102 MMOL/L (ref 98–107)
CREAT SERPL-MCNC: 0.76 MG/DL (ref 0.51–0.95)
DEPRECATED CALCIDIOL+CALCIFEROL SERPL-MC: 55 UG/L (ref 20–75)
DEPRECATED HCO3 PLAS-SCNC: 25 MMOL/L (ref 22–29)
EOSINOPHIL # BLD AUTO: 0.1 10E3/UL (ref 0–0.7)
EOSINOPHIL NFR BLD AUTO: 1 %
ERYTHROCYTE [DISTWIDTH] IN BLOOD BY AUTOMATED COUNT: 12.7 % (ref 10–15)
GFR SERPL CREATININE-BSD FRML MDRD: 87 ML/MIN/1.73M2
GLUCOSE SERPL-MCNC: 121 MG/DL (ref 70–99)
HCT VFR BLD AUTO: 42.5 % (ref 35–47)
HGB BLD-MCNC: 13.9 G/DL (ref 11.7–15.7)
IMM GRANULOCYTES # BLD: 0 10E3/UL
IMM GRANULOCYTES NFR BLD: 0 %
LYMPHOCYTES # BLD AUTO: 1.1 10E3/UL (ref 0.8–5.3)
LYMPHOCYTES NFR BLD AUTO: 15 %
MCH RBC QN AUTO: 30.9 PG (ref 26.5–33)
MCHC RBC AUTO-ENTMCNC: 32.7 G/DL (ref 31.5–36.5)
MCV RBC AUTO: 94 FL (ref 78–100)
MONOCYTES # BLD AUTO: 0.4 10E3/UL (ref 0–1.3)
MONOCYTES NFR BLD AUTO: 6 %
NEUTROPHILS # BLD AUTO: 5.8 10E3/UL (ref 1.6–8.3)
NEUTROPHILS NFR BLD AUTO: 78 %
NRBC # BLD AUTO: 0 10E3/UL
NRBC BLD AUTO-RTO: 0 /100
PLATELET # BLD AUTO: 211 10E3/UL (ref 150–450)
POTASSIUM SERPL-SCNC: 3.4 MMOL/L (ref 3.4–5.3)
PROT SERPL-MCNC: 6.6 G/DL (ref 6.4–8.3)
RBC # BLD AUTO: 4.5 10E6/UL (ref 3.8–5.2)
SODIUM SERPL-SCNC: 142 MMOL/L (ref 136–145)
WBC # BLD AUTO: 7.4 10E3/UL (ref 4–11)

## 2022-09-07 PROCEDURE — 85025 COMPLETE CBC W/AUTO DIFF WBC: CPT | Mod: ORL | Performed by: FAMILY MEDICINE

## 2022-09-07 PROCEDURE — 80053 COMPREHEN METABOLIC PANEL: CPT | Mod: ORL | Performed by: FAMILY MEDICINE

## 2022-09-07 PROCEDURE — 36415 COLL VENOUS BLD VENIPUNCTURE: CPT | Mod: ORL | Performed by: FAMILY MEDICINE

## 2022-09-07 PROCEDURE — P9604 ONE-WAY ALLOW PRORATED TRIP: HCPCS | Mod: ORL | Performed by: FAMILY MEDICINE

## 2022-09-07 PROCEDURE — 82306 VITAMIN D 25 HYDROXY: CPT | Mod: ORL | Performed by: FAMILY MEDICINE

## 2022-09-26 PROCEDURE — 81001 URINALYSIS AUTO W/SCOPE: CPT | Mod: ORL | Performed by: FAMILY MEDICINE

## 2022-10-15 ENCOUNTER — HEALTH MAINTENANCE LETTER (OUTPATIENT)
Age: 62
End: 2022-10-15

## 2023-01-01 ENCOUNTER — HOSPITAL ENCOUNTER (EMERGENCY)
Facility: CLINIC | Age: 63
Discharge: HOME | End: 2023-10-17
Attending: EMERGENCY MEDICINE | Admitting: EMERGENCY MEDICINE
Payer: COMMERCIAL

## 2023-01-01 ENCOUNTER — HEALTH MAINTENANCE LETTER (OUTPATIENT)
Age: 63
End: 2023-01-01

## 2023-01-01 ENCOUNTER — LAB REQUISITION (OUTPATIENT)
Dept: LAB | Facility: CLINIC | Age: 63
End: 2023-01-01
Payer: COMMERCIAL

## 2023-01-01 ENCOUNTER — VIRTUAL VISIT (OUTPATIENT)
Dept: NEUROLOGY | Facility: CLINIC | Age: 63
End: 2023-01-01
Payer: COMMERCIAL

## 2023-01-01 ENCOUNTER — APPOINTMENT (OUTPATIENT)
Dept: GENERAL RADIOLOGY | Facility: CLINIC | Age: 63
End: 2023-01-01
Attending: EMERGENCY MEDICINE
Payer: COMMERCIAL

## 2023-01-01 ENCOUNTER — PATIENT OUTREACH (OUTPATIENT)
Dept: CARE COORDINATION | Facility: CLINIC | Age: 63
End: 2023-01-01
Payer: COMMERCIAL

## 2023-01-01 VITALS
OXYGEN SATURATION: 100 % | SYSTOLIC BLOOD PRESSURE: 115 MMHG | DIASTOLIC BLOOD PRESSURE: 71 MMHG | HEART RATE: 71 BPM | TEMPERATURE: 97.6 F | RESPIRATION RATE: 26 BRPM

## 2023-01-01 DIAGNOSIS — Z79.899 OTHER LONG TERM (CURRENT) DRUG THERAPY: ICD-10-CM

## 2023-01-01 DIAGNOSIS — R30.0 DYSURIA: ICD-10-CM

## 2023-01-01 DIAGNOSIS — G30.0 EARLY ONSET ALZHEIMER'S DEMENTIA WITHOUT BEHAVIORAL DISTURBANCE (H): Primary | ICD-10-CM

## 2023-01-01 DIAGNOSIS — Z86.59 HISTORY OF DEMENTIA: ICD-10-CM

## 2023-01-01 DIAGNOSIS — F02.80 EARLY ONSET ALZHEIMER'S DEMENTIA WITHOUT BEHAVIORAL DISTURBANCE (H): Primary | ICD-10-CM

## 2023-01-01 DIAGNOSIS — R41.82 ALTERED MENTAL STATUS, UNSPECIFIED: ICD-10-CM

## 2023-01-01 DIAGNOSIS — R06.03 RESPIRATORY DISTRESS: ICD-10-CM

## 2023-01-01 LAB
ALBUMIN SERPL BCG-MCNC: 4.2 G/DL (ref 3.5–5.2)
ALBUMIN SERPL BCG-MCNC: 4.3 G/DL (ref 3.5–5.2)
ALBUMIN UR-MCNC: 30 MG/DL
ALBUMIN UR-MCNC: NEGATIVE MG/DL
ALP SERPL-CCNC: 77 U/L (ref 35–104)
ALP SERPL-CCNC: 81 U/L (ref 35–104)
ALT SERPL W P-5'-P-CCNC: 15 U/L (ref 10–35)
ALT SERPL W P-5'-P-CCNC: 18 U/L (ref 10–35)
ANION GAP SERPL CALCULATED.3IONS-SCNC: 13 MMOL/L (ref 7–15)
ANION GAP SERPL CALCULATED.3IONS-SCNC: 14 MMOL/L (ref 7–15)
ANION GAP SERPL CALCULATED.3IONS-SCNC: 18 MMOL/L (ref 7–15)
APPEARANCE UR: ABNORMAL
APPEARANCE UR: CLEAR
AST SERPL W P-5'-P-CCNC: 29 U/L (ref 10–35)
AST SERPL W P-5'-P-CCNC: 37 U/L (ref 10–35)
ATRIAL RATE - MUSE: 69 BPM
BACTERIA #/AREA URNS HPF: ABNORMAL /HPF
BACTERIA UR CULT: ABNORMAL
BACTERIA UR CULT: NORMAL
BASO+EOS+MONOS # BLD AUTO: NORMAL 10*3/UL
BASO+EOS+MONOS NFR BLD AUTO: NORMAL %
BASOPHILS # BLD AUTO: 0 10E3/UL (ref 0–0.2)
BASOPHILS NFR BLD AUTO: 0 %
BILIRUB SERPL-MCNC: 0.3 MG/DL
BILIRUB SERPL-MCNC: 0.3 MG/DL
BILIRUB UR QL STRIP: NEGATIVE
BILIRUB UR QL STRIP: NEGATIVE
BUN SERPL-MCNC: 10.9 MG/DL (ref 8–23)
BUN SERPL-MCNC: 14.5 MG/DL (ref 8–23)
BUN SERPL-MCNC: 18 MG/DL (ref 8–23)
CALCIUM SERPL-MCNC: 9.6 MG/DL (ref 8.8–10.2)
CHLORIDE SERPL-SCNC: 104 MMOL/L (ref 98–107)
CHLORIDE SERPL-SCNC: 104 MMOL/L (ref 98–107)
CHLORIDE SERPL-SCNC: 105 MMOL/L (ref 98–107)
COLOR UR AUTO: ABNORMAL
COLOR UR AUTO: YELLOW
CREAT SERPL-MCNC: 0.69 MG/DL (ref 0.51–0.95)
CREAT SERPL-MCNC: 0.72 MG/DL (ref 0.51–0.95)
CREAT SERPL-MCNC: 0.86 MG/DL (ref 0.51–0.95)
DEPRECATED HCO3 PLAS-SCNC: 22 MMOL/L (ref 22–29)
DEPRECATED HCO3 PLAS-SCNC: 22 MMOL/L (ref 22–29)
DEPRECATED HCO3 PLAS-SCNC: 24 MMOL/L (ref 22–29)
DIASTOLIC BLOOD PRESSURE - MUSE: NORMAL MMHG
EGFRCR SERPLBLD CKD-EPI 2021: 75 ML/MIN/1.73M2
EOSINOPHIL # BLD AUTO: 0 10E3/UL (ref 0–0.7)
EOSINOPHIL NFR BLD AUTO: 1 %
ERYTHROCYTE [DISTWIDTH] IN BLOOD BY AUTOMATED COUNT: 12.5 % (ref 10–15)
ERYTHROCYTE [DISTWIDTH] IN BLOOD BY AUTOMATED COUNT: 12.7 % (ref 10–15)
ERYTHROCYTE [DISTWIDTH] IN BLOOD BY AUTOMATED COUNT: 13.1 % (ref 10–15)
FLUAV RNA SPEC QL NAA+PROBE: NEGATIVE
FLUBV RNA RESP QL NAA+PROBE: NEGATIVE
GFR SERPL CREATININE-BSD FRML MDRD: >90 ML/MIN/1.73M2
GFR SERPL CREATININE-BSD FRML MDRD: >90 ML/MIN/1.73M2
GLUCOSE SERPL-MCNC: 113 MG/DL (ref 70–99)
GLUCOSE SERPL-MCNC: 97 MG/DL (ref 70–99)
GLUCOSE SERPL-MCNC: 99 MG/DL (ref 70–99)
GLUCOSE UR STRIP-MCNC: NEGATIVE MG/DL
GLUCOSE UR STRIP-MCNC: NEGATIVE MG/DL
HCT VFR BLD AUTO: 39.1 % (ref 35–47)
HCT VFR BLD AUTO: 43.1 % (ref 35–47)
HCT VFR BLD AUTO: 43.5 % (ref 35–47)
HGB BLD-MCNC: 13.2 G/DL (ref 11.7–15.7)
HGB BLD-MCNC: 13.9 G/DL (ref 11.7–15.7)
HGB BLD-MCNC: 14 G/DL (ref 11.7–15.7)
HGB UR QL STRIP: NEGATIVE
HGB UR QL STRIP: NEGATIVE
IMM GRANULOCYTES # BLD: 0 10E3/UL
IMM GRANULOCYTES NFR BLD: 0 %
INTERPRETATION ECG - MUSE: NORMAL
KETONES UR STRIP-MCNC: NEGATIVE MG/DL
KETONES UR STRIP-MCNC: NEGATIVE MG/DL
LEUKOCYTE ESTERASE UR QL STRIP: ABNORMAL
LEUKOCYTE ESTERASE UR QL STRIP: ABNORMAL
LYMPHOCYTES # BLD AUTO: 2 10E3/UL (ref 0.8–5.3)
LYMPHOCYTES NFR BLD AUTO: 23 %
MCH RBC QN AUTO: 30.2 PG (ref 26.5–33)
MCH RBC QN AUTO: 30.4 PG (ref 26.5–33)
MCH RBC QN AUTO: 31.9 PG (ref 26.5–33)
MCHC RBC AUTO-ENTMCNC: 32.2 G/DL (ref 31.5–36.5)
MCHC RBC AUTO-ENTMCNC: 32.3 G/DL (ref 31.5–36.5)
MCHC RBC AUTO-ENTMCNC: 33.8 G/DL (ref 31.5–36.5)
MCV RBC AUTO: 94 FL (ref 78–100)
MONOCYTES # BLD AUTO: 0.7 10E3/UL (ref 0–1.3)
MONOCYTES NFR BLD AUTO: 8 %
MUCOUS THREADS #/AREA URNS LPF: PRESENT /LPF
MUCOUS THREADS #/AREA URNS LPF: PRESENT /LPF
NEUTROPHILS # BLD AUTO: 5.9 10E3/UL (ref 1.6–8.3)
NEUTROPHILS NFR BLD AUTO: 68 %
NITRATE UR QL: NEGATIVE
NITRATE UR QL: NEGATIVE
NRBC # BLD AUTO: 0 10E3/UL
NRBC BLD AUTO-RTO: 0 /100
P AXIS - MUSE: 67 DEGREES
PH UR STRIP: 7 [PH] (ref 5–7)
PH UR STRIP: 7.5 [PH] (ref 5–7)
PLATELET # BLD AUTO: 209 10E3/UL (ref 150–450)
PLATELET # BLD AUTO: 213 10E3/UL (ref 150–450)
PLATELET # BLD AUTO: 225 10E3/UL (ref 150–450)
POTASSIUM SERPL-SCNC: 3.9 MMOL/L (ref 3.4–5.3)
POTASSIUM SERPL-SCNC: 4 MMOL/L (ref 3.4–5.3)
POTASSIUM SERPL-SCNC: 4 MMOL/L (ref 3.4–5.3)
PR INTERVAL - MUSE: 160 MS
PROT SERPL-MCNC: 6.6 G/DL (ref 6.4–8.3)
PROT SERPL-MCNC: 6.9 G/DL (ref 6.4–8.3)
QRS DURATION - MUSE: 72 MS
QT - MUSE: 462 MS
QTC - MUSE: 495 MS
R AXIS - MUSE: 65 DEGREES
RBC # BLD AUTO: 4.14 10E6/UL (ref 3.8–5.2)
RBC # BLD AUTO: 4.57 10E6/UL (ref 3.8–5.2)
RBC # BLD AUTO: 4.63 10E6/UL (ref 3.8–5.2)
RBC URINE: 5 /HPF
RBC URINE: <1 /HPF
RSV RNA SPEC NAA+PROBE: NEGATIVE
SARS-COV-2 RNA RESP QL NAA+PROBE: NEGATIVE
SODIUM SERPL-SCNC: 141 MMOL/L (ref 135–145)
SODIUM SERPL-SCNC: 141 MMOL/L (ref 136–145)
SODIUM SERPL-SCNC: 144 MMOL/L (ref 136–145)
SP GR UR STRIP: 1.01 (ref 1–1.03)
SP GR UR STRIP: 1.02 (ref 1–1.03)
SQUAMOUS EPITHELIAL: 1 /HPF
SQUAMOUS EPITHELIAL: <1 /HPF
SYSTOLIC BLOOD PRESSURE - MUSE: NORMAL MMHG
T AXIS - MUSE: 44 DEGREES
TRANSITIONAL EPI: <1 /HPF
TROPONIN T SERPL HS-MCNC: 22 NG/L
UROBILINOGEN UR STRIP-MCNC: NORMAL MG/DL
UROBILINOGEN UR STRIP-MCNC: NORMAL MG/DL
VALPROATE SERPL-MCNC: 46.9 UG/ML
VALPROATE SERPL-MCNC: 58 UG/ML
VENTRICULAR RATE- MUSE: 69 BPM
WBC # BLD AUTO: 6.6 10E3/UL (ref 4–11)
WBC # BLD AUTO: 7.9 10E3/UL (ref 4–11)
WBC # BLD AUTO: 8.7 10E3/UL (ref 4–11)
WBC URINE: 17 /HPF
WBC URINE: >182 /HPF

## 2023-01-01 PROCEDURE — 80053 COMPREHEN METABOLIC PANEL: CPT | Mod: ORL | Performed by: NURSE PRACTITIONER

## 2023-01-01 PROCEDURE — 87186 SC STD MICRODIL/AGAR DIL: CPT | Mod: ORL | Performed by: NURSE PRACTITIONER

## 2023-01-01 PROCEDURE — 85027 COMPLETE CBC AUTOMATED: CPT | Mod: ORL | Performed by: NURSE PRACTITIONER

## 2023-01-01 PROCEDURE — 93005 ELECTROCARDIOGRAM TRACING: CPT

## 2023-01-01 PROCEDURE — 87086 URINE CULTURE/COLONY COUNT: CPT | Mod: ORL | Performed by: FAMILY MEDICINE

## 2023-01-01 PROCEDURE — 87637 SARSCOV2&INF A&B&RSV AMP PRB: CPT | Performed by: EMERGENCY MEDICINE

## 2023-01-01 PROCEDURE — 84484 ASSAY OF TROPONIN QUANT: CPT | Performed by: EMERGENCY MEDICINE

## 2023-01-01 PROCEDURE — 85025 COMPLETE CBC W/AUTO DIFF WBC: CPT | Performed by: EMERGENCY MEDICINE

## 2023-01-01 PROCEDURE — 36415 COLL VENOUS BLD VENIPUNCTURE: CPT | Mod: ORL | Performed by: NURSE PRACTITIONER

## 2023-01-01 PROCEDURE — 82310 ASSAY OF CALCIUM: CPT | Performed by: EMERGENCY MEDICINE

## 2023-01-01 PROCEDURE — 99285 EMERGENCY DEPT VISIT HI MDM: CPT | Mod: 25

## 2023-01-01 PROCEDURE — 36415 COLL VENOUS BLD VENIPUNCTURE: CPT | Performed by: EMERGENCY MEDICINE

## 2023-01-01 PROCEDURE — P9604 ONE-WAY ALLOW PRORATED TRIP: HCPCS | Mod: ORL | Performed by: NURSE PRACTITIONER

## 2023-01-01 PROCEDURE — 80164 ASSAY DIPROPYLACETIC ACD TOT: CPT | Mod: ORL | Performed by: NURSE PRACTITIONER

## 2023-01-01 PROCEDURE — 81001 URINALYSIS AUTO W/SCOPE: CPT | Mod: ORL | Performed by: FAMILY MEDICINE

## 2023-01-01 PROCEDURE — 81001 URINALYSIS AUTO W/SCOPE: CPT | Mod: ORL | Performed by: NURSE PRACTITIONER

## 2023-01-01 PROCEDURE — 99214 OFFICE O/P EST MOD 30 MIN: CPT | Mod: 95 | Performed by: PSYCHIATRY & NEUROLOGY

## 2023-01-01 PROCEDURE — 71046 X-RAY EXAM CHEST 2 VIEWS: CPT

## 2023-01-01 RX ORDER — MEMANTINE HYDROCHLORIDE 10 MG/1
10 TABLET ORAL
Qty: 90 TABLET | Refills: 3 | Status: SHIPPED | OUTPATIENT
Start: 2023-01-01

## 2023-01-01 RX ORDER — DONEPEZIL HYDROCHLORIDE 10 MG/1
10 TABLET, FILM COATED ORAL AT BEDTIME
Qty: 90 TABLET | Refills: 3 | Status: SHIPPED | OUTPATIENT
Start: 2023-01-01

## 2023-01-01 ASSESSMENT — PAIN SCALES - GENERAL: PAINLEVEL: NO PAIN (0)

## 2023-01-01 ASSESSMENT — ACTIVITIES OF DAILY LIVING (ADL): ADLS_ACUITY_SCORE: 35

## 2023-08-04 NOTE — PROGRESS NOTES
[facility] Neurology Follow Up Visit    Cristel Turk MRN# [id number]   Age: 63 year old YOB: 1960     Brief history of symptoms: The patient was initially seen in neurologic consultation on 2/26/2020 and most recently 8/2/2022 for evaluation of early onset Alzheimer's dementia. Please see the comprehensive neurologic consultation notes from those dates in the Epic records for details.     At our last visit, the patient was having reduced paranoia with use of antipsychotics.  She was tolerating her memantine and donepezil    Cristel is present today with her daughter.  She is smiling, and waves. Her daughter speaks for her at this time.  Cristel has had some ups and downs this year.  There was an attempt to reduce her antipsychotics, as this led to some resistance to cares and agressiveness.  However, more recently, upon increasing her olanzapine and mirtazapine, she was becoming stooped in nature and more hypophonic and shuffling with her gait.  In the last month or two, she has had improvements in these issues (can walk continuously, posture is upright, less hypophonic).  Her sadness and crying has improved as well.     Cristel has had weight fluctuations, actually gaining weight some 6 months ago.  In the last 6 months, she is eating well but slowly at this.  She may need prompting to eat at times.  She has lost some weight in the most recent months.  She can walk if prompted, and with music.       Physical Exam:   General: Seated comfortably in no acute distress.  Neurologic:     Mental Status: Fully alert, at times attentive to our conversation, makes eye contact at times. Doesn't respond to her daughter quickly. Speech is hypophonic.      Cranial Nerves:  Facial movements symmetric. Hearing not formally tested but intact to conversation.  No dysarthria.     Motor: No tremors or other abnormal movements observed.     Pertinent Investigations since last visit:   Meds at this time:  - Olanzapine 2.5 mg in the AM,  5 mg in the evening  - Mirtazapine 15 mg at bedtime  - Donepezil 10 mg every day  - Memantine 10 mg QAM         Assessment and Plan:   Assessment:  Early onset Alzheimer's dementia, late stage    The patient's medical team and family are doing an outstanding job with trying to balance Cristel's progression related to agitation, sedation, functional decline and sleep. At this point, I would agree that trying to stop use of one of the antipsychotics would be beneficial, and that olanzapine may be a good place to start.  I do think that newer agents for agitation in Alzheimer's could be considered, such as Rexulti, if a second agent is needed for periodic agitation.  For now, I would recommend Cristel continue on Memantine and Donepezil for more of a mood stabilizing affect.       Plan:  Continue with memantine 10 mg QAM  Continue donepezil 10 mg QD  I would consider using     Follow up in Neurology clinic in one year, or earlier as needed should new concerns arise.    PITO Wen D.O.   of Neurology    Total time today (30 min) in this patient encounter was spent on pre-charting, counseling and/or coordination of care.         Virtual Visit Details    Type of service:  Video Visit   Video Start Time: 210  Video End Time:2:38 PM    Originating Location (pt. Location): Home    Distant Location (provider location):  On-site  Platform used for Video Visit: Omega

## 2023-08-04 NOTE — LETTER
8/4/2023       RE: Cristel Turk  [address]  [facility] [address]       Dear Colleague,    Thank you for referring your patient, Cristel Turk, to the [facility] NEUROLOGY CLINIC [place] at [facility]. Please see a copy of my visit note below.    [facility] Neurology Follow Up Visit    Cristel Turk MRN# [id number]   Age: 63 year old YOB: 1960     Brief history of symptoms: The patient was initially seen in neurologic consultation on 2/26/2020 and most recently 8/2/2022 for evaluation of early onset Alzheimer's dementia. Please see the comprehensive neurologic consultation notes from those dates in the Epic records for details.     At our last visit, the patient was having reduced paranoia with use of antipsychotics.  She was tolerating her memantine and donepezil    Cristel is present today with her daughter.  She is smiling, and waves. Her daughter speaks for her at this time.  Cristel has had some ups and downs this year.  There was an attempt to reduce her antipsychotics, as this led to some resistance to cares and agressiveness.  However, more recently, upon increasing her olanzapine and mirtazapine, she was becoming stooped in nature and more hypophonic and shuffling with her gait.  In the last month or two, she has had improvements in these issues (can walk continuously, posture is upright, less hypophonic).  Her sadness and crying has improved as well.     Cristel has had weight fluctuations, actually gaining weight some 6 months ago.  In the last 6 months, she is eating well but slowly at this.  She may need prompting to eat at times.  She has lost some weight in the most recent months.  She can walk if prompted, and with music.       Physical Exam:   General: Seated comfortably in no acute distress.  Neurologic:     Mental Status: Fully alert, at times attentive to our conversation, makes eye contact at times. Doesn't respond to her  daughter quickly. Speech is hypophonic.      Cranial Nerves:  Facial movements symmetric. Hearing not formally tested but intact to conversation.  No dysarthria.     Motor: No tremors or other abnormal movements observed.     Pertinent Investigations since last visit:   Meds at this time:  - Olanzapine 2.5 mg in the AM, 5 mg in the evening  - Mirtazapine 15 mg at bedtime  - Donepezil 10 mg every day  - Memantine 10 mg QAM         Assessment and Plan:   Assessment:  Early onset Alzheimer's dementia, late stage    The patient's medical team and family are doing an outstanding job with trying to balance Cristel's progression related to agitation, sedation, functional decline and sleep. At this point, I would agree that trying to stop use of one of the antipsychotics would be beneficial, and that olanzapine may be a good place to start.  I do think that newer agents for agitation in Alzheimer's could be considered, such as Rexulti, if a second agent is needed for periodic agitation.  For now, I would recommend Cristel continue on Memantine and Donepezil for more of a mood stabilizing affect.       Plan:  Continue with memantine 10 mg QAM  Continue donepezil 10 mg QD  I would consider using     Follow up in Neurology clinic in one year, or earlier as needed should new concerns arise.      Total time today (30 min) in this patient encounter was spent on pre-charting, counseling and/or coordination of care.         Again, thank you for allowing me to participate in the care of your patient.      Sincerely,    Gómez Wen, DO

## 2023-08-04 NOTE — NURSING NOTE
Is the patient currently in the state of [place]? YES    Visit mode:VIDEO    If the visit is dropped, the patient can be reconnected by: VIDEO VISIT: Send to e-mail at: [email or web address]    Will anyone else be joining the visit? NO      How would you like to obtain your AVS? MyChart    Are changes needed to the allergy or medication list? NO    Reason for visit: MELY SANTAMARIA, VF

## 2023-08-04 NOTE — PATIENT INSTRUCTIONS
Continue with donepezil and memantine.    If possible, wean from olanzapine and trial a newer agent called rexulti

## 2023-10-17 NOTE — ED PROVIDER NOTES
History     Chief Complaint:  Shortness of Breath       HPI   Cristel Turk is a 63 year old female NH resident with hx dementia arrives with daughter for evaluation of increased work of breathing today.  A couple day history mild congestion, and rash noted on chest.  No fever.  Patient is essentially non verbal, unable to give history.  No apparent physical discomfort noted. No hx smoking.       Independent Historian:    Sibling - They report no history smoking or lung disease.     Review of External Notes:  NH paperwork; full code.     Medications:    calcium carbonate 500 mg, elemental, (OSCAL) 500 MG tablet  citalopram (CELEXA) 10 MG tablet  donepezil (ARICEPT) 10 MG tablet  melatonin 3 MG tablet  melatonin 5 MG tablet  memantine (NAMENDA) 10 MG tablet  mirtazapine (REMERON) 7.5 MG tablet  multivitamin w/minerals (THERA-VIT-M) tablet  OLANZapine zydis (ZYPREXA) 15 MG ODT  traZODone (DESYREL) 50 MG tablet  VITAMIN D3 25 MCG (1000 UT) tablet        Past Medical History:    Past Medical History:   Diagnosis Date    Memory loss        Past Surgical History:    Past Surgical History:   Procedure Laterality Date    GI SURGERY      colonoscopy          Physical Exam   Patient Vitals for the past 24 hrs:   BP Temp Temp src Pulse Resp SpO2   10/17/23 1718 -- -- -- 71 -- --   10/17/23 1714 115/71 97.6  F (36.4  C) Oral -- 26 100 %        Physical Exam  General: Patient is alert, baseline per daughter.   HENT:  Normal nose, oropharynx.   Eyes: EOMI. Normal conjunctiva.  Neck:  Normal range of motion and appearance.   Cardiovascular:  Normal rate, regular rhythm and normal heart sounds.   Pulmonary/Chest:  slightly increased effort; no wheezing or crackles.   Abdominal: Soft. No distension or tenderness.     Musculoskeletal: Normal range of motion. No edema or tenderness.   Neurological: awake, alert, no apparent focal weakness.   Skin: Warm and dry. No rash or bruising.   Psychiatric: no apparent psychosis.        Emergency Department Course     ECG results from 10/17/23   EKG 12-lead, tracing only     Value    Systolic Blood Pressure     Diastolic Blood Pressure     Ventricular Rate 69    Atrial Rate 69    ME Interval 160    QRS Duration 72        QTc 495    P Axis 67    R AXIS 65    T Axis 44    Interpretation ECG      Sinus rhythm  Prolonged QT  Abnormal ECG  When compared with ECG of 01-FEB-2022 14:55,  QT has lengthened  Confirmed by - EMERGENCY ROOM, PHYSICIAN ([id number]),  JACKY DIEGO ([id number]) on 10/18/2023 6:42:17 AM         Imaging:  XR Chest 2 Views   Final Result   IMPRESSION: Negative chest.        Report per radiology    Laboratory:  Labs Ordered and Resulted from Time of ED Arrival to Time of ED Departure   TROPONIN T, HIGH SENSITIVITY - Abnormal       Result Value    Troponin T, High Sensitivity 22 (*)    BASIC METABOLIC PANEL - Normal    Sodium 141      Potassium 4.0      Chloride 105      Carbon Dioxide (CO2) 22      Anion Gap 14      Urea Nitrogen 18.0      Creatinine 0.86      GFR Estimate 75      Calcium 9.6      Glucose 99     INFLUENZA A/B, RSV, & SARS-COV2 PCR - Normal    Influenza A PCR Negative      Influenza B PCR Negative      RSV PCR Negative      SARS CoV2 PCR Negative     CBC WITH PLATELETS AND DIFFERENTIAL    WBC Count 8.7      RBC Count 4.14      Hemoglobin 13.2      Hematocrit 39.1      MCV 94      MCH 31.9      MCHC 33.8      RDW 13.1      Platelet Count 225      % Neutrophils 68      % Lymphocytes 23      % Monocytes 8      Mids % (Monos, Eos, Basos)        % Eosinophils 1      % Basophils 0      % Immature Granulocytes 0      NRBCs per 100 WBC 0      Absolute Neutrophils 5.9      Absolute Lymphocytes 2.0      Absolute Monocytes 0.7      Mids Abs (Monos, Eos, Basos)        Absolute Eosinophils 0.0      Absolute Basophils 0.0      Absolute Immature Granulocytes 0.0      Absolute NRBCs 0.0              Emergency Department Course & Assessments:    Interventions:  Medications -  No data to display        Social Determinants of Health affecting care:       Disposition:  The patient was discharged to home.     Impression & Plan    CMS Diagnoses:       Medical Decision Making:  Afebrile and hemodynamically stable 63-year-old female nursing home resident with advanced dementia arrives for evaluation of congestion and concerns for increased work of breathing.  She is essentially noncommunicative.  Exam is notable for shallow slightly increased respiratory rate but no wheezing, crackles, or other abnormal breath sounds and a normal cardiac exam.  Testing has included negative rapid influenza and COVID tests.  A chest x-ray shows no evidence of pneumonia or congestive heart failure.  An EKG depicted a sinus rhythm with no sign of ischemia or ectopy.  Her troponin is 22, slightly increased from the normal female reference range.  The clinical significance of this is uncertain.  It was discussed with her daughter who is a decision maker for her.  Options for proceeding were a repeat high-sensitivity troponin versus discharge back to the nursing home.  It is fully dressed and walking around the room and exhibiting no sign of distress.  She is currently full code.  I have encouraged her daughter to have family conference regarding whether or not to continue full code, given her profound dementia.  Her daughter does understand the rationale behind repeat troponins, and was explained that if this were were to elevate, then the decision will be whether or not to hospitalize her for more aggressive cardiac evaluation.  In the end, she has decided to take her mom back to the nursing home.  I advised that they follow-up with her clinic for recheck if continued concerns and return to the emergency department if worsening symptoms, documented hypoxia, or other concerns.    Diagnosis:    ICD-10-CM    1. Respiratory distress  R06.03       2. History of dementia  Z86.59            Discharge Medications:  New  Prescriptions    No medications on file          Mik Gagnon MD  10/17/2023   Mik Gagnon MD Isaacson, Brian A, MD  10/19/23 1211

## 2023-10-17 NOTE — ED TRIAGE NOTES
Pt has a history of alzheimers. Pt's nursing home contacted daughter and reported that the Pt has an increased RR, a rash on her chest, greenish discharge from from both eyes. This started today.

## 2023-10-20 NOTE — PROGRESS NOTES
Social Work Follow-Up  [facility]    Data/Intervention:  Patient Name:  Cristel Turk  /Age:  1960 (63 year old)    Reason for Follow-Up:  ED visit    Collaborated With:    -Pt's dtr Miriam    Intervention/Education/Resources Provided:  Previous contact with Pt/dtr. Emailed her dtr to offer words of support and provided the POLST document. The ED provider discussed with Miriam, consideration of addressing goals of care in the setting of severe dementia.     Assessment/Plan:  Remain available.     Previously provided patient/family with writer's contact information and availability.       RUBINA Jones, [facility]    [facility]  [address] [place] [id number]    [phone]

## 2024-01-05 ENCOUNTER — TELEPHONE (OUTPATIENT)
Dept: NEUROLOGY | Facility: CLINIC | Age: 64
End: 2024-01-05
Payer: COMMERCIAL

## 2024-01-05 ENCOUNTER — MYC MEDICAL ADVICE (OUTPATIENT)
Dept: NEUROLOGY | Facility: CLINIC | Age: 64
End: 2024-01-05
Payer: COMMERCIAL

## 2024-01-05 NOTE — TELEPHONE ENCOUNTER
Sent [employer] (1st Attempt) for the patient to call back and schedule the following:    Appointment type: Return Neurology (video)  Provider: Bettye  Return date: Around 8/4/24  Specialty phone number: [phone]  Additional appointment(s) needed: N/A  Additional Notes: N/A    Spoke with Miriam, primary phone # on file. She stated it is not a good time right now, not able to schedule. Sent [employer].     Navid Benedict on 1/5/2024 at 3:43 PM

## 2024-01-07 ENCOUNTER — HEALTH MAINTENANCE LETTER (OUTPATIENT)
Age: 64
End: 2024-01-07